# Patient Record
Sex: FEMALE | Race: BLACK OR AFRICAN AMERICAN | NOT HISPANIC OR LATINO | Employment: OTHER | URBAN - METROPOLITAN AREA
[De-identification: names, ages, dates, MRNs, and addresses within clinical notes are randomized per-mention and may not be internally consistent; named-entity substitution may affect disease eponyms.]

---

## 2017-12-01 NOTE — PROGRESS NOTES
SUBJECTIVE:   CC: Gerardo Valenzuela is an 57 year old woman who presents for preventive health visit.     Healthy Habits:    Do you get at least three servings of calcium containing foods daily (dairy, green leafy vegetables, etc.)? no, taking calcium and/or vitamin D supplement: no    Amount of exercise or daily activities, outside of work: 0 day(s) per week    Problems taking medications regularly No    Medication side effects: No    Have you had an eye exam in the past two years? yes    Do you see a dentist twice per year? yes    Do you have sleep apnea, excessive snoring or daytime drowsiness?no        Today's PHQ-2 Score:   PHQ-2 ( 1999 [employer]) 8/15/2012   Q1: Little interest or pleasure in doing things 0   Q2: Feeling down, depressed or hopeless 0   PHQ-2 Score 0         Abuse: Current or Past(Physical, Sexual or Emotional)- No  Do you feel safe in your environment - Yes    Social History   Substance Use Topics     Smoking status: Never Smoker     Smokeless tobacco: Never Used     Alcohol use No     The patient does not drink >3 drinks per day nor >7 drinks per week.    Reviewed orders with patient.  Reviewed health maintenance and updated orders accordingly - Yes  Labs reviewed in [facility]    Patient over age 50, mutual decision to screen reflected in health maintenance.      Pertinent mammograms are reviewed under the imaging tab.  History of abnormal Pap smear: NO - age 30- 65 PAP every 3 years recommended    Reviewed and updated as needed this visit by clinical staff  Allergies         Reviewed and updated as needed this visit by Provider              ROS:  C: NEGATIVE for fever, chills, change in weight  I: NEGATIVE for worrisome rashes, moles or lesions  E: NEGATIVE for vision changes or irritation  ENT: NEGATIVE for ear, mouth and throat problems  R: NEGATIVE for significant cough or SOB  B: NEGATIVE for masses, tenderness or discharge  CV: NEGATIVE for chest pain, palpitations or peripheral edema  GI:  NEGATIVE for nausea, abdominal pain, or change in bowel habits; rare heartburn symptoms with consumption of spicy foods. Not daily.  : NEGATIVE for unusual urinary or vaginal symptoms. No vaginal bleeding.  N: NEGATIVE for weakness, dizziness or paresthesias  P: NEGATIVE for changes in mood or affect     OBJECTIVE:   /74  Pulse 84  Temp 97.3  F (36.3  C) (Oral)  Ht 5' (1.524 m)  Wt 144 lb 8 oz (65.5 kg)  SpO2 97%  BMI 28.22 kg/m2  EXAM:  GENERAL: healthy, alert and no distress  HENT: ear canals and TM's normal, nose and mouth without ulcers or lesions  NECK: no adenopathy, no asymmetry, masses, or scars and thyroid normal to palpation  RESP: lungs clear to auscultation - no rales, rhonchi or wheezes  BREAST: normal without masses, tenderness or nipple discharge and no palpable axillary masses or adenopathy  CV: regular rate and rhythm, normal S1 S2, no S3 or S4, no murmur, click or rub, no peripheral edema and peripheral pulses strong  ABDOMEN: soft, nontender, no hepatosplenomegaly, no masses and bowel sounds normal   (female): normal female external genitalia, normal urethral meatus, vaginal mucosa, normal cervix/adnexa/uterus without masses or discharge  MS: no gross musculoskeletal defects noted, no edema  SKIN: no suspicious lesions or rashes  PSYCH: mentation appears normal, affect normal/bright    ASSESSMENT/PLAN:       ICD-10-CM    1. Screening cholesterol level Z13.220 Lipid Profile   2. Routine general medical examination at a health care facility Z00.00    3. Encounter for routine adult medical exam with abnormal findings Z00.01    4. Screen for colon cancer Z12.11    5. Visit for screening mammogram Z12.31 MA SCREENING DIGITAL BILAT - Future  (s+30)   6. Screening for malignant neoplasm of cervix Z12.4 Pap imaged thin layer screen with HPV - recommended age 30 - 65 years (select HPV order below)   7. Need for hepatitis C screening test Z11.59 Hepatitis C Screen Reflex to HCV RNA Quant and  Genotype   8. Heartburn R12 ranitidine (ZANTAC) 150 MG tablet       COUNSELING:   Reviewed preventive health counseling, as reflected in patient instructions       Regular exercise       Healthy diet/nutrition         reports that she has never smoked. She has never used smokeless tobacco.    Estimated body mass index is 28.22 kg/(m^2) as calculated from the following:    Height as of this encounter: 5' (1.524 m).    Weight as of this encounter: 144 lb 8 oz (65.5 kg).         Counseling Resources:  ATP IV Guidelines  Pooled Cohorts Equation Calculator  Breast Cancer Risk Calculator  FRAX Risk Assessment  ICSI Preventive Guidelines  Dietary Guidelines for Americans, 2010  USDA's MyPlate  ASA Prophylaxis  Lung CA Screening    IVONNE Dover CNP  [facility]

## 2017-12-04 ENCOUNTER — OFFICE VISIT (OUTPATIENT)
Dept: FAMILY MEDICINE | Facility: CLINIC | Age: 57
End: 2017-12-04
Payer: COMMERCIAL

## 2017-12-04 VITALS
HEART RATE: 84 BPM | SYSTOLIC BLOOD PRESSURE: 124 MMHG | OXYGEN SATURATION: 97 % | WEIGHT: 144.5 LBS | BODY MASS INDEX: 28.37 KG/M2 | DIASTOLIC BLOOD PRESSURE: 74 MMHG | HEIGHT: 60 IN | TEMPERATURE: 97.3 F

## 2017-12-04 DIAGNOSIS — R12 HEARTBURN: ICD-10-CM

## 2017-12-04 DIAGNOSIS — Z12.11 SCREEN FOR COLON CANCER: ICD-10-CM

## 2017-12-04 DIAGNOSIS — Z13.220 SCREENING CHOLESTEROL LEVEL: Primary | ICD-10-CM

## 2017-12-04 DIAGNOSIS — Z12.4 SCREENING FOR MALIGNANT NEOPLASM OF CERVIX: ICD-10-CM

## 2017-12-04 DIAGNOSIS — Z00.00 ROUTINE GENERAL MEDICAL EXAMINATION AT A HEALTH CARE FACILITY: ICD-10-CM

## 2017-12-04 DIAGNOSIS — Z00.01 ENCOUNTER FOR ROUTINE ADULT MEDICAL EXAM WITH ABNORMAL FINDINGS: ICD-10-CM

## 2017-12-04 DIAGNOSIS — Z11.59 NEED FOR HEPATITIS C SCREENING TEST: ICD-10-CM

## 2017-12-04 DIAGNOSIS — Z12.31 VISIT FOR SCREENING MAMMOGRAM: ICD-10-CM

## 2017-12-04 PROCEDURE — 99396 PREV VISIT EST AGE 40-64: CPT | Performed by: NURSE PRACTITIONER

## 2017-12-04 PROCEDURE — 80061 LIPID PANEL: CPT | Performed by: NURSE PRACTITIONER

## 2017-12-04 PROCEDURE — G0145 SCR C/V CYTO,THINLAYER,RESCR: HCPCS | Performed by: NURSE PRACTITIONER

## 2017-12-04 PROCEDURE — 87624 HPV HI-RISK TYP POOLED RSLT: CPT | Performed by: NURSE PRACTITIONER

## 2017-12-04 PROCEDURE — 86803 HEPATITIS C AB TEST: CPT | Performed by: NURSE PRACTITIONER

## 2017-12-04 PROCEDURE — 36415 COLL VENOUS BLD VENIPUNCTURE: CPT | Performed by: NURSE PRACTITIONER

## 2017-12-04 NOTE — LETTER
December 12, 2017      Gerardo Valenzuela  [address] [place] [address]  [place] [address]        Dear Gerardo,   Your cholesterol was slightly elevated for your age, but is not high enough to require cholesterol medication. I would recommend keeping up with lots of fruits and vegetables, and daily walking.   Please let me know if you have any questions.   Take care    Resulted Orders   Hepatitis C Screen Reflex to HCV RNA Quant and Genotype   Result Value Ref Range    Hepatitis C Antibody Nonreactive NR^Nonreactive      Comment:      Assay performance characteristics have not been established for newborns,   infants, and children     Lipid Profile   Result Value Ref Range    Cholesterol 222 (H) <200 mg/dL      Comment:      Desirable:       <200 mg/dl    Triglycerides 227 (H) <150 mg/dL      Comment:      Borderline high:  150-199 mg/dl  High:             200-499 mg/dl  Very high:       >499 mg/dl      HDL Cholesterol 37 (L) >49 mg/dL    LDL Cholesterol Calculated 140 (H) <100 mg/dL      Comment:      Above desirable:  100-129 mg/dl  Borderline High:  130-159 mg/dL  High:             160-189 mg/dL  Very high:       >189 mg/dl      Non HDL Cholesterol 185 (H) <130 mg/dL      Comment:      Above Desirable:  130-159 mg/dl  Borderline high:  160-189 mg/dl  High:             190-219 mg/dl  Very high:       >219 mg/dl         If you have any questions or concerns, please call the clinic at the number listed above.       Sincerely,        IVONNE Dover CNP

## 2017-12-04 NOTE — LETTER
December 13, 2017    Gerardo Valenzuela  [address] [place] [address]  [place] [address]    Dear Gerardo,  We are happy to inform you that your PAP smear result from 12/04/17 is normal.  We are now able to do a follow up test on PAP smears. The DNA test is for HPV (Human Papilloma Virus). Cervical cancer is closely linked with certain types of HPV. Your result showed no evidence of high risk HPV.  Therefore we recommend you return in 5 years for your next pap smear and HPV test.  You will still need to return to the clinic every year for an annual exam and other preventive tests.  Please contact the clinic at [phone] with any questions.  Sincerely,    IVONNE Dover CNP/[facility]

## 2017-12-04 NOTE — PROGRESS NOTES
"  SUBJECTIVE:   Gerardo Valenzuela is a 57 year old female who presents to clinic today for the following health issues:    Joint Pain    Onset: 11/22/2017     Description:   Location: left shoulder, left ribs, and left wrist  Character: Sharp, and tightness    Intensity: severe    Progression of Symptoms: same    Accompanying Signs & Symptoms:  Other symptoms: swelling    History:   Previous similar pain: no       Precipitating factors:   Trauma or overuse: YES- Fell down in front of the store     Alleviating factors:  Improved by: nothing    Therapies Tried and outcome: ice, ibuprofen   Had previous xrays at another clinic for wrist,  which were all normal  Has continued to have some pain in neck and left shoulder; but otherwise feeling much better and moving around well.     -------------------------------------    Problem list and histories reviewed & adjusted, as indicated.  Additional history: as documented    Patient Active Problem List   Diagnosis     CARDIOVASCULAR SCREENING; LDL GOAL LESS THAN 160     No past surgical history on file.    Social History   Substance Use Topics     Smoking status: Never Smoker     Smokeless tobacco: Never Used     Alcohol use No     Family History   Problem Relation Age of Onset     CEREBROVASCULAR DISEASE Mother              [facility]  M[facility]       Reviewed and updated as needed this visit by Provider         ROS:  Constitutional, HEENT, cardiovascular, pulmonary, gi and gu systems are negative, except as otherwise noted.      OBJECTIVE:   /72  Pulse 76  Temp 97  F (36.1  C) (Oral)  Ht 4' 11.57\" (1.513 m)  Wt 142 lb 11.2 oz (64.7 kg)  SpO2 97%  BMI 28.28 kg/m2  Body mass index is 28.28 kg/(m^2).   GENERAL: healthy, alert and no distress  NECK: no adenopathy, no asymmetry, masses, or scars and thyroid normal to palpation  RESP: lungs clear to auscultation - no rales, rhonchi or wheezes  CV: regular rate " and rhythm, normal S1 S2, no S3 or S4, no murmur, click or rub, no peripheral edema and peripheral pulses strong  ABDOMEN: soft, nontender, no hepatosplenomegaly, no masses and bowel sounds normal  MS: tenderness to palpation paracervical muscles; normal strength and  ROM both upper extremities; tender to palpation anterior left shoulder      Diagnostic Test Results:  none     ASSESSMENT/PLAN:     Problem List Items Addressed This Visit     None      Visit Diagnoses     Cervicalgia    -  Primary    Relevant Medications    ibuprofen (ADVIL/MOTRIN) 800 MG tablet    Other Relevant Orders    XR Cervical Spine 2/3 Views (Completed)    Acute pain of left shoulder        Relevant Medications    acetaminophen-codeine (TYLENOL #3) 300-30 MG per tablet    Other Relevant Orders    XR Shoulder Left 2 Views (Completed)    XR Chest 2 Views (Completed)    PHYSICAL THERAPY REFERRAL         IVONNE Dover [facility]

## 2017-12-04 NOTE — MR AVS SNAPSHOT
After Visit Summary   12/4/2017    Gerardo Valenzuela    MRN: [id number]           Patient Information     Date Of Birth          1960        Visit Information        Provider Department      12/4/2017 11:30 AM Shawna Miranda APRN [facility]        Today's Diagnoses     Screening cholesterol level    -  1    Routine general medical examination at a health care facility        Encounter for routine adult medical exam with abnormal findings        Screen for colon cancer        Visit for screening mammogram        Screening for malignant neoplasm of cervix        Need for hepatitis C screening test          Care Instructions      Preventive Health Recommendations  Female Ages 50 - 64    Yearly exam: See your health care provider every year in order to  o Review health changes.   o Discuss preventive care.    o Review your medicines if your doctor has prescribed any.      Get a Pap test every three years (unless you have an abnormal result and your provider advises testing more often).    If you get Pap tests with HPV test, you only need to test every 5 years, unless you have an abnormal result.     You do not need a Pap test if your uterus was removed (hysterectomy) and you have not had cancer.    You should be tested each year for STDs (sexually transmitted diseases) if you're at risk.     Have a mammogram every 1 to 2 years.    Have a colonoscopy at age 50, or have a yearly FIT test (stool test). These exams screen for colon cancer.      Have a cholesterol test every 5 years, or more often if advised.    Have a diabetes test (fasting glucose) every three years. If you are at risk for diabetes, you should have this test more often.     If you are at risk for osteoporosis (brittle bone disease), think about having a bone density scan (DEXA).    Shots: Get a flu shot each year. Get a tetanus shot every 10 years.    Nutrition:     Eat at least 5 servings of fruits and  vegetables each day.    Eat whole-grain bread, whole-wheat pasta and brown rice instead of white grains and rice.    Talk to your provider about Calcium and Vitamin D.     Lifestyle    Exercise at least 150 minutes a week (30 minutes a day, 5 days a week). This will help you control your weight and prevent disease.    Limit alcohol to one drink per day.    No smoking.     Wear sunscreen to prevent skin cancer.     See your dentist every six months for an exam and cleaning.    See your eye doctor every 1 to 2 years.            Follow-ups after your visit        Your next 10 appointments already scheduled     Dec 05, 2017 10:30 AM CST   Office Visit with IVONNE Dover CNP   [facility]    [address] [place] [id number] [phone]           Bring a current list of meds and any records pertaining to this visit. For Physicals, please bring immunization records and any forms needing to be filled out. Please arrive 10 minutes early to complete paperwork.              Future tests that were ordered for you today     Open Future Orders        Priority Expected Expires Ordered    MA SCREENING DIGITAL BILAT - Future  (s+30) Routine  12/1/2018 12/4/2017            Who to contact     If you have questions or need follow up information about today's clinic visit or your schedule please contact [facility] [place] at [phone].  Normal or non-critical lab and imaging results will be communicated to you by MyChart, letter or phone within 4 business days after the clinic has received the results. If you do not hear from us within 7 days, please contact the clinic through MyChart or phone. If you have a critical or abnormal lab result, we will notify you by phone as soon as possible.  Submit refill requests through [facility] or call your pharmacy and they will forward the refill request to us. Please allow 3 business days for your  "refill to be completed.          Additional Information About Your Visit        [employer] Information     [employer] lets you send messages to your doctor, view your test results, renew your prescriptions, schedule appointments and more. To sign up, go to [email or web address] . Click on \"Log in\" on the left side of the screen, which will take you to the Welcome page. Then click on \"Sign up Now\" on the right side of the page.     You will be asked to enter the access code listed below, as well as some personal information. Please follow the directions to create your username and password.     Your access code is: [email or web address]  Expires: 3/4/2018 12:08 PM     Your access code will  in 90 days. If you need help or a new code, please call your [place] clinic or [phone].        Care EveryWhere ID     This is your Care EveryWhere ID. This could be used by other organizations to access your [place] medical records  [id number]        Your Vitals Were     Pulse Temperature Height Pulse Oximetry BMI (Body Mass Index)       84 97.3  F (36.3  C) (Oral) 5' (1.524 m) 97% 28.22 kg/m2        Blood Pressure from Last 3 Encounters:   17 124/74   13 100/58   08/15/12 115/72    Weight from Last 3 Encounters:   17 144 lb 8 oz (65.5 kg)   13 142 lb (64.4 kg)   08/15/12 137 lb (62.1 kg)              We Performed the Following     Hepatitis C Screen Reflex to HCV RNA Quant and Genotype     Lipid Profile     Pap imaged thin layer screen with HPV - recommended age 30 - 65 years (select HPV order below)        Primary Care Provider Office Phone # Fax #    [facility] [phone]       [address] [place] [id number]        Equal Access to Services     SCOTTIE MCCANN : Wilian Rendon, dannielle trejo, vilma guo. So [facility] [phone].    ATENCIÓN: Si agnieszka ram, tiene a fuentes disposición " servicios gratuitos de asistencia lingüística. Gail ziegler [phone].    We comply with applicable federal civil rights laws and [place] laws. We do not discriminate on the basis of race, color, national origin, age, disability, sex, sexual orientation, or gender identity.            Thank you!     Thank you for choosing [facility]  for your care. Our goal is always to provide you with excellent care. Hearing back from our patients is one way we can continue to improve our services. Please take a few minutes to complete the written survey that you may receive in the mail after your visit with us. Thank you!             Your Updated Medication List - Protect others around you: Learn how to safely use, store and throw away your medicines at www.disposemymeds.org.          This list is accurate as of: 12/4/17 12:08 PM.  Always use your most recent med list.                   Brand Name Dispense Instructions for use Diagnosis    omeprazole 20 MG tablet     30 tablet    Take 1 tablet (20 mg) by mouth daily Take 30-60 minutes before a meal.    Esophageal reflux

## 2017-12-05 ENCOUNTER — HOSPITAL ENCOUNTER (OUTPATIENT)
Dept: GENERAL RADIOLOGY | Facility: CLINIC | Age: 57
End: 2017-12-05
Attending: NURSE PRACTITIONER
Payer: COMMERCIAL

## 2017-12-05 ENCOUNTER — HOSPITAL ENCOUNTER (OUTPATIENT)
Dept: GENERAL RADIOLOGY | Facility: CLINIC | Age: 57
Discharge: HOME | End: 2017-12-05
Attending: NURSE PRACTITIONER | Admitting: NURSE PRACTITIONER
Payer: COMMERCIAL

## 2017-12-05 ENCOUNTER — OFFICE VISIT (OUTPATIENT)
Dept: FAMILY MEDICINE | Facility: CLINIC | Age: 57
End: 2017-12-05
Payer: COMMERCIAL

## 2017-12-05 VITALS
HEART RATE: 76 BPM | BODY MASS INDEX: 28.02 KG/M2 | WEIGHT: 142.7 LBS | HEIGHT: 60 IN | SYSTOLIC BLOOD PRESSURE: 120 MMHG | OXYGEN SATURATION: 97 % | TEMPERATURE: 97 F | DIASTOLIC BLOOD PRESSURE: 72 MMHG

## 2017-12-05 DIAGNOSIS — M25.512 ACUTE PAIN OF LEFT SHOULDER: ICD-10-CM

## 2017-12-05 DIAGNOSIS — M54.2 CERVICALGIA: Primary | ICD-10-CM

## 2017-12-05 LAB
CHOLEST SERPL-MCNC: 222 MG/DL
HCV AB SERPL QL IA: NONREACTIVE
HDLC SERPL-MCNC: 37 MG/DL
LDLC SERPL CALC-MCNC: 140 MG/DL
NONHDLC SERPL-MCNC: 185 MG/DL
TRIGL SERPL-MCNC: 227 MG/DL

## 2017-12-05 PROCEDURE — 73030 X-RAY EXAM OF SHOULDER: CPT | Mod: LT

## 2017-12-05 PROCEDURE — 72040 X-RAY EXAM NECK SPINE 2-3 VW: CPT

## 2017-12-05 PROCEDURE — 99213 OFFICE O/P EST LOW 20 MIN: CPT | Performed by: NURSE PRACTITIONER

## 2017-12-05 PROCEDURE — 71020 XR CHEST 2 VW: CPT

## 2017-12-05 RX ORDER — IBUPROFEN 800 MG/1
800 TABLET, FILM COATED ORAL EVERY 8 HOURS PRN
Qty: 90 TABLET | Refills: 1 | Status: SHIPPED | OUTPATIENT
Start: 2017-12-05 | End: 2018-06-14

## 2017-12-05 NOTE — MR AVS SNAPSHOT
"              After Visit Summary   12/5/2017    Gerardo Valenzuela    MRN: [id number]           Patient Information     Date Of Birth          1960        Visit Information        Provider Department      12/5/2017 10:30 AM Shawna Miranda APRN [facility]        Today's Diagnoses     Cervicalgia    -  1    Screen for colon cancer        Visit for screening mammogram        Screening for malignant neoplasm of cervix        Need for hepatitis C screening test        Acute pain of left shoulder           Follow-ups after your visit        Additional Services     PHYSICAL THERAPY REFERRAL       *This therapy referral will be filtered to a centralized scheduling office at [facility] and the patient will receive a call to schedule an appointment at a [place] location most convenient for them. *     [facility] provides Physical Therapy evaluation and treatment and many specialty services across the [place] system.  If requesting a specialty program, please choose from the list below.    If you have not heard from the scheduling office within 2 business days, please call [phone] for all locations, with the exception of [place], please call [phone].  Treatment: Evaluation & Treatment  Special Instructions/Modalities:   Special Programs: None    Please be aware that coverage of these services is subject to the terms and limitations of your health insurance plan.  Call member services at your health plan with any benefit or coverage questions.      **Note to Provider:  If you are referring outside of [place] for the therapy appointment, please list the name of the location in the \"special instructions\" above, print the referral and give to the patient to schedule the appointment.                  Future tests that were ordered for you today     Open Future Orders        Priority Expected Expires Ordered    MA SCREENING DIGITAL BILAT - Future  " "(s+30) Routine  2018            Who to contact     If you have questions or need follow up information about today's clinic visit or your schedule please contact [facility] directly at [phone].  Normal or non-critical lab and imaging results will be communicated to you by MyChart, letter or phone within 4 business days after the clinic has received the results. If you do not hear from us within 7 days, please contact the clinic through IQuumhart or phone. If you have a critical or abnormal lab result, we will notify you by phone as soon as possible.  Submit refill requests through 1-4 All or call your pharmacy and they will forward the refill request to us. Please allow 3 business days for your refill to be completed.          Additional Information About Your Visit        [email or web address] Information     1-4 All lets you send messages to your doctor, view your test results, renew your prescriptions, schedule appointments and more. To sign up, go to [email or web address] . Click on \"Log in\" on the left side of the screen, which will take you to the Welcome page. Then click on \"Sign up Now\" on the right side of the page.     You will be asked to enter the access code listed below, as well as some personal information. Please follow the directions to create your username and password.     Your access code is: [email or web address]  Expires: 3/4/2018 12:08 PM     Your access code will  in 90 days. If you need help or a new code, please call your [facility] or [phone].        Care EveryWhere ID     This is your Care EveryWhere ID. This could be used by other organizations to access your [facility] medical records  [id number]        Your Vitals Were     Pulse Temperature Height Pulse Oximetry BMI (Body Mass Index)       76 97  F (36.1  C) (Oral) 4' 11.57\" (1.513 m) 97% 28.28 kg/m2        Blood Pressure from Last 3 Encounters:   17 120/72   17 124/74   13 100/58    " Weight from Last 3 Encounters:   12/05/17 142 lb 11.2 oz (64.7 kg)   12/04/17 144 lb 8 oz (65.5 kg)   11/26/13 142 lb (64.4 kg)              We Performed the Following     PHYSICAL THERAPY REFERRAL     XR Cervical Spine 2/3 Views     XR Chest 2 Views     XR Shoulder Left 2 Views          Today's Medication Changes          These changes are accurate as of: 12/5/17 11:16 AM.  If you have any questions, ask your nurse or doctor.               Start taking these medicines.        Dose/Directions    acetaminophen-codeine 300-30 MG per tablet   Commonly known as:  TYLENOL #3   Used for:  Acute pain of left shoulder        Dose:  1 tablet   Take 1 tablet by mouth every 4 hours as needed for pain maximum 2  tablet(s) per day   Quantity:  18 tablet   Refills:  1       ibuprofen 800 MG tablet   Commonly known as:  ADVIL/MOTRIN   Used for:  Cervicalgia        Dose:  800 mg   Take 1 tablet (800 mg) by mouth every 8 hours as needed for moderate pain   Quantity:  90 tablet   Refills:  1            Where to get your medicines      These medications were sent to [facility] [address]  [address] [facility] [address], [facility] [address]     Phone:  [phone]     ibuprofen 800 MG tablet         Some of these will need a paper prescription and others can be bought over the counter.  Ask your nurse if you have questions.     Bring a paper prescription for each of these medications     acetaminophen-codeine 300-30 MG per tablet                Primary Care Provider Office Phone # Fax #    [facility] [phone]       [address] [facility] [address]  [facility] [address]        Equal Access to Services     SCOTTIE MCCANN AH: Hadii kirill durano Sokhushbu, waaxda luqadaha, qaybta kaalmada adeegyada, vilma castillo. So [facility] [phone].    ATENCIÓN: Si habla español, tiene a fuentes disposición servicios gratuitos de asistencia lingüística. Llame al  [phone].    We comply with applicable federal civil rights laws and [place] laws. We do not discriminate on the basis of race, color, national origin, age, disability, sex, sexual orientation, or gender identity.            Thank you!     Thank you for choosing [facility]  for your care. Our goal is always to provide you with excellent care. Hearing back from our patients is one way we can continue to improve our services. Please take a few minutes to complete the written survey that you may receive in the mail after your visit with us. Thank you!             Your Updated Medication List - Protect others around you: Learn how to safely use, store and throw away your medicines at www.disposemymeds.org.          This list is accurate as of: 12/5/17 11:16 AM.  Always use your most recent med list.                   Brand Name Dispense Instructions for use Diagnosis    acetaminophen-codeine 300-30 MG per tablet    TYLENOL #3    18 tablet    Take 1 tablet by mouth every 4 hours as needed for pain maximum 2  tablet(s) per day    Acute pain of left shoulder       ibuprofen 800 MG tablet    ADVIL/MOTRIN    90 tablet    Take 1 tablet (800 mg) by mouth every 8 hours as needed for moderate pain    Cervicalgia       omeprazole 20 MG tablet     30 tablet    Take 1 tablet (20 mg) by mouth daily Take 30-60 minutes before a meal.    Esophageal reflux       ranitidine 150 MG tablet    ZANTAC    60 tablet    Take 1 tablet (150 mg) by mouth 2 times daily    Heartburn

## 2017-12-05 NOTE — LETTER
December 11, 2017      Gerardo Valenzuela  [address] [place] [id number]        Dear ,    We are writing to inform you of your test results.  Your test results fall within the expected range(s) or remain unchanged from previous results.  Please continue with current treatment plan.    Resulted Orders   XR Cervical Spine 2/3 Views    Narrative    CERVICAL SPINE TWO TO THREE VIEWS December 5, 2017 11:52 AM     HISTORY: Cervicalgia.    COMPARISON: None.      Impression    IMPRESSION: Vertebral body C2-C7 well seen on the lateral view. No  loss of vertebral body height. Bones appear osteopenic. No significant  loss of intervertebral disc space. Base of the dens is unremarkable.  Lateral masses of C1 appear normally aligned on C2. Small well  marginated density along the anterior inferior aspect of the C5  vertebral body presumably representing an osteophyte.    J CARLOS ROSAS MD       If you have any questions or concerns, please call the clinic at the number listed above.       Sincerely,        IVONNE Dover CNP

## 2017-12-06 LAB
COPATH REPORT: NORMAL
PAP: NORMAL

## 2017-12-08 LAB
FINAL DIAGNOSIS: NORMAL
HPV HR 12 DNA CVX QL NAA+PROBE: NEGATIVE
HPV16 DNA SPEC QL NAA+PROBE: NEGATIVE
HPV18 DNA SPEC QL NAA+PROBE: NEGATIVE
SPECIMEN DESCRIPTION: NORMAL

## 2018-06-14 ENCOUNTER — OFFICE VISIT (OUTPATIENT)
Dept: FAMILY MEDICINE | Facility: CLINIC | Age: 58
End: 2018-06-14
Payer: COMMERCIAL

## 2018-06-14 VITALS
SYSTOLIC BLOOD PRESSURE: 118 MMHG | BODY MASS INDEX: 28.14 KG/M2 | WEIGHT: 142 LBS | DIASTOLIC BLOOD PRESSURE: 70 MMHG | TEMPERATURE: 98.1 F | HEART RATE: 82 BPM

## 2018-06-14 DIAGNOSIS — R10.31 RLQ ABDOMINAL PAIN: ICD-10-CM

## 2018-06-14 DIAGNOSIS — Z12.11 SPECIAL SCREENING FOR MALIGNANT NEOPLASMS, COLON: ICD-10-CM

## 2018-06-14 DIAGNOSIS — R30.0 DYSURIA: Primary | ICD-10-CM

## 2018-06-14 DIAGNOSIS — R10.13 DYSPEPSIA: ICD-10-CM

## 2018-06-14 LAB
ALBUMIN UR-MCNC: NEGATIVE MG/DL
APPEARANCE UR: CLEAR
BILIRUB UR QL STRIP: NEGATIVE
COLOR UR AUTO: YELLOW
GLUCOSE UR STRIP-MCNC: NEGATIVE MG/DL
HGB UR QL STRIP: NEGATIVE
KETONES UR STRIP-MCNC: NEGATIVE MG/DL
LEUKOCYTE ESTERASE UR QL STRIP: NEGATIVE
NITRATE UR QL: NEGATIVE
PH UR STRIP: 7 PH (ref 5–7)
SOURCE: NORMAL
SP GR UR STRIP: <=1.005 (ref 1–1.03)
UROBILINOGEN UR STRIP-ACNC: 0.2 EU/DL (ref 0.2–1)

## 2018-06-14 PROCEDURE — 99214 OFFICE O/P EST MOD 30 MIN: CPT | Performed by: NURSE PRACTITIONER

## 2018-06-14 PROCEDURE — 81003 URINALYSIS AUTO W/O SCOPE: CPT | Performed by: NURSE PRACTITIONER

## 2018-06-14 RX ORDER — FAMOTIDINE 20 MG/1
20 TABLET, FILM COATED ORAL 2 TIMES DAILY
Qty: 60 TABLET | Refills: 1 | Status: SHIPPED | OUTPATIENT
Start: 2018-06-14 | End: 2018-06-20

## 2018-06-14 NOTE — MR AVS SNAPSHOT
After Visit Summary   6/14/2018    Gerardo Valenzuela    MRN: [id number]           Patient Information     Date Of Birth          1960        Visit Information        Provider Department      6/14/2018 3:20 PM Bibiana Najera APRN [facility]        Today's Diagnoses     Dysuria    -  1    RLQ abdominal pain        Special screening for malignant neoplasms, colon        Dyspepsia          Care Instructions    Please return for a follow up appointment with me after getting the h pylori and we can go over the results and see how the pepcid is working for you        *Abdominal Pain, Unknown Cause (Female)    The exact cause of your abdominal (stomach) pain is not certain. This does not mean that this is something to worry about, or the right tests were not done. Everyone likes to know the exact cause of the problem, but sometimes with abdominal pain, there is no clear-cut cause, and this could be a good thing. The good news is that your symptoms can be treated, and you will feel better.   Your condition does not seem serious now; however, sometimes the signs of a serious problem may take more time to appear. For this reason, it is important for you to watch for any new symptoms, problems, or worsening of your condition.  Over the next few days, the abdominal pain may come and go, or be continuous. Other common symptoms can include nausea and vomiting. Sometimes it can be difficult to tell if you feel nauseous, you may just feel bad and not associate that feeling with nausea. Constipation, diarrhea, and a fever may go along with the pain.  The pain may continue even if treated correctly over the following days. Depending on how things go, sometimes the cause can become clear and may require further or different treatment. Additional evaluations, medications, or tests may be needed.  Home care  Your health care provider may prescribe medications for pain, symptoms, or an  infection.  Follow the health care provider's instructions for taking these medications.  General care    Rest until your next exam. No strenuous activities.    Try to find positions that ease discomfort. A small pillow placed on the abdomen may help relieve pain.    Something warm on your abdomen (such as a heating pad) may help, but be careful not to burn yourself.  Diet    Do not force yourself to eat, especially if having cramps, vomiting, or diarrhea.    Water is important so you do not get dehydrated. Soup may also be good. Sports drinks may also help, especially if they are not too acidic. Make sure you don't drink sugary drinks as this can make things worse. Take liquids in small amounts. Do not guzzle them.    Caffeine sometimes makes the pain and cramping worse.    Avoid dairy products if you have vomiting or diarrhea.    Don't eat large amounts at a time. Wait a few minutes between bites.    Eat a diet low in fiber (called a low-residue diet). Foods allowed include refined breads, white rice, fruit and vegetable juices without pulp, tender meats. These foods will pass more easily through the intestine.    Avoid fried or fatty foods, dairy, alcohol and spicy foods until your symptoms go away.  Follow-up care  Follow up with your health care provider as instructed, or if your pain does not begin to improve in the next 24 hours.  When to seek medical care  Seek prompt medical care if any of the following occur:    Pain gets worse or moves to the right lower abdomen    New or worsening vomiting or diarrhea    Swelling of the abdomen    Unable to pass stool for more than three days    New fever over 101  F (38.3 C), or rising fever    Blood in vomit or bowel movements (dark red or black color)    Jaundice (yellow color of eyes and skin)    Weakness, dizziness    Chest, arm, back, neck or jaw pain    Unexpected vaginal bleeding or missed period  Call 911  Call emergency services if any of the following  occur:    Trouble breathing    Confusion    Fainting or loss of consciousness    Rapid heart rate    Seizure    [id number] Oli Garcia, [address] [facility], [place] [address]. All rights reserved. This information is not intended as a substitute for professional medical care. Always follow your healthcare professional's instructions.                Follow-ups after your visit        Additional Services     GASTROENTEROLOGY ADULT REF PROCEDURE ONLY       Last Lab Result: Creatinine (mg/dL)       Date                     Value                 11/27/2013               0.50 (L)         ----------  Body mass index is 28.14 kg/(m^2).     Needed:  No  Language:  English    Patient will be contacted to schedule procedure.     Please be aware that coverage of these services is subject to the terms and limitations of your health insurance plan.  Call member services at your health plan with any benefit or coverage questions.  Any procedures must be performed at a [facility] facility OR coordinated by your clinic's referral office.    Please bring the following with you to your appointment:    (1) Any X-Rays, CTs or MRIs which have been performed.  Contact the facility where they were done to arrange for  prior to your scheduled appointment.    (2) List of current medications   (3) This referral request   (4) Any documents/labs given to you for this referral                  Future tests that were ordered for you today     Open Future Orders        Priority Expected Expires Ordered    H Pylori antigen stool Routine  7/14/2018 6/14/2018            Who to contact     If you have questions or need follow up information about today's clinic visit or your schedule please contact [facility] directly at [phone].  Normal or non-critical lab and imaging results will be communicated to you by MyChart, letter or phone within 4 business days after the clinic has received the results. If you do  not hear from us within 7 days, please contact the clinic through Airbrite or phone. If you have a critical or abnormal lab result, we will notify you by phone as soon as possible.  Submit refill requests through Airbrite or call your pharmacy and they will forward the refill request to us. Please allow 3 business days for your refill to be completed.          Additional Information About Your Visit        Care EveryWhere ID     This is your Care EveryWhere ID. This could be used by other organizations to access your [facility] medical records  [id number]        Your Vitals Were     Pulse Temperature BMI (Body Mass Index)             82 98.1  F (36.7  C) (Oral) 28.14 kg/m2          Blood Pressure from Last 3 Encounters:   06/14/18 118/70   12/05/17 120/72   12/04/17 124/74    Weight from Last 3 Encounters:   06/14/18 142 lb (64.4 kg)   12/05/17 142 lb 11.2 oz (64.7 kg)   12/04/17 144 lb 8 oz (65.5 kg)              We Performed the Following     GASTROENTEROLOGY ADULT REF PROCEDURE ONLY     UA reflex to Microscopic and Culture          Today's Medication Changes          These changes are accurate as of 6/14/18  4:22 PM.  If you have any questions, ask your nurse or doctor.               Start taking these medicines.        Dose/Directions    famotidine 20 MG tablet   Commonly known as:  PEPCID   Used for:  Dyspepsia   Started by:  Bibiana Najera APRN CNP        Dose:  20 mg   Take 1 tablet (20 mg) by mouth 2 times daily   Quantity:  60 tablet   Refills:  1         Stop taking these medicines if you haven't already. Please contact your care team if you have questions.     ranitidine 150 MG tablet   Commonly known as:  ZANTAC   Stopped by:  Bibiana Najera APRN CNP                Where to get your medicines      These medications were sent to [facility] [place], [place] - [address]  [address] [address]     Phone:  [phone]     famotidine 20 MG tablet                 Primary Care Provider Office Phone # Fax #    [facility] [phone]       [address] [address]        Equal Access to Services     SCOTTIE MCCANN : Hadii kirill ku hadcynthiao Soomaali, waaxda luqadaha, qaybta kaalmada adeegyada, vilma ansarin flynn zapien laPaolamariah castillo. So [facility] [phone].    ATENCIÓN: Si habla español, tiene a fuentes disposición servicios gratuitos de asistencia lingüística. Llame al [phone].    We comply with applicable federal civil rights laws and [place] laws. We do not discriminate on the basis of race, color, national origin, age, disability, sex, sexual orientation, or gender identity.            Thank you!     Thank you for choosing [facility]  for your care. Our goal is always to provide you with excellent care. Hearing back from our patients is one way we can continue to improve our services. Please take a few minutes to complete the written survey that you may receive in the mail after your visit with us. Thank you!             Your Updated Medication List - Protect others around you: Learn how to safely use, store and throw away your medicines at www.disposemymeds.org.          This list is accurate as of 6/14/18  4:22 PM.  Always use your most recent med list.                   Brand Name Dispense Instructions for use Diagnosis    famotidine 20 MG tablet    PEPCID    60 tablet    Take 1 tablet (20 mg) by mouth 2 times daily    Dyspepsia

## 2018-06-14 NOTE — PROGRESS NOTES
SUBJECTIVE:   Gerardo Valenzuela is a 57 year old female who presents to clinic today for the following health issues:      Abdominal pain     Onset: 2 days    Description:   Character: Dull ache and Burning  Location: right lower quadrant  Radiation: None    Intensity: severe    Progression of Symptoms:  worsening    Accompanying Signs & Symptoms:  Fever/Chills?: no   Gas/Bloating: YES  Nausea: YES  Vomitting: no   Diarrhea?: no   Constipation:no   Dysuria or Hematuria: no    History:   Trauma: no   Previous similar pain: YES   Previous tests done: none    Precipitating factors:   Does the pain change with:     Food: YES     BM: no     Urination: YES, burning and pain after urinenating     Alleviating factors:  Heat, Advil     Therapies Tried and outcome: Heat, Advil    Not sure if worse after foods, maybe worse after spicy foods , Maybe greasy foods  Normal soft stools no blood or mucous   Yesterday nausea little bit   UA normal   Works around babies   Heartburn is her baseline and zantac didn't help at all, omeprazole used to help   Drinking plenty of water today but usually on low end       Problem list and histories reviewed & adjusted, as indicated.  Additional history: as documented    Patient Active Problem List   Diagnosis     CARDIOVASCULAR SCREENING; LDL GOAL LESS THAN 160     History reviewed. No pertinent surgical history.    Social History   Substance Use Topics     Smoking status: Never Smoker     Smokeless tobacco: Never Used     Alcohol use No     Family History   Problem Relation Age of Onset     CEREBROVASCULAR DISEASE Mother          Current Outpatient Prescriptions   Medication Sig Dispense Refill     famotidine (PEPCID) 20 MG tablet Take 1 tablet (20 mg) by mouth 2 times daily 60 tablet 1     Allergies   Allergen Reactions     Penicillin [Penicillins]      Recent Labs   Lab Test  12/04/17   1218  11/27/13   0636  11/26/13   2357  02/15/11   0931   LDL  140*  110   --   119   HDL  37*  35*    --   34*   TRIG  227*  154*   --   171*   ALT   --   24   --   23   CR   --   0.50*  0.62  0.54   GFRESTIMATED   --   >90  >90  >90   GFRESTBLACK   --   >90  >90  >90   POTASSIUM   --   4.1  3.8  3.7   TSH   --    --    --   0.76      BP Readings from Last 3 Encounters:   06/14/18 118/70   12/05/17 120/72   12/04/17 124/74    Wt Readings from Last 3 Encounters:   06/14/18 142 lb (64.4 kg)   12/05/17 142 lb 11.2 oz (64.7 kg)   12/04/17 144 lb 8 oz (65.5 kg)                  Labs reviewed in EPIC    Reviewed and updated as needed this visit by clinical staff       Reviewed and updated as needed this visit by Provider         ROS:  Constitutional, HEENT, cardiovascular, pulmonary, gi and gu systems are negative, except as otherwise noted.    OBJECTIVE:     /70  Pulse 82  Temp 98.1  F (36.7  C) (Oral)  Wt 142 lb (64.4 kg)  BMI 28.14 kg/m2  Body mass index is 28.14 kg/(m^2).  GENERAL: healthy, alert and no distress  EYES: Eyes grossly normal to inspection, PERRL and conjunctivae and sclerae normal  HENT: ear canals and TM's normal, nose and mouth without ulcers or lesions  NECK: no adenopathy, no asymmetry, masses, or scars and thyroid normal to palpation  RESP: lungs clear to auscultation - no rales, rhonchi or wheezes  CV: regular rate and rhythm, normal S1 S2, no S3 or S4, no murmur, click or rub, no peripheral edema and peripheral pulses strong  ABDOMEN: soft, nontender or very slight tenderness epigastric/RLQ area no hepatosplenomegaly, no masses and bowel sounds normal, no McBurney or rebound tenderness, negative Zhu's    MS: no gross musculoskeletal defects noted, no edema  NEURO: Normal strength and tone, mentation intact and speech normal  BACK: no CVA tenderness, no paralumbar tenderness  PSYCH: mentation appears normal, affect normal/bright    Diagnostic Test Results:  Results for orders placed or performed in visit on 06/14/18 (from the past 24 hour(s))   UA reflex to Microscopic and Culture    Result Value Ref Range    Color Urine Yellow     Appearance Urine Clear     Glucose Urine Negative NEG^Negative mg/dL    Bilirubin Urine Negative NEG^Negative    Ketones Urine Negative NEG^Negative mg/dL    Specific Gravity Urine <=1.005 1.003 - 1.035    Blood Urine Negative NEG^Negative    pH Urine 7.0 5.0 - 7.0 pH    Protein Albumin Urine Negative NEG^Negative mg/dL    Urobilinogen Urine 0.2 0.2 - 1.0 EU/dL    Nitrite Urine Negative NEG^Negative    Leukocyte Esterase Urine Negative NEG^Negative    Source Midstream Urine        ASSESSMENT/PLAN:         ICD-10-CM    1. Dysuria R30.0 UA reflex to Microscopic and Culture   2. RLQ abdominal pain R10.31 H Pylori antigen stool   3. Special screening for malignant neoplasms, colon Z12.11 GASTROENTEROLOGY ADULT REF PROCEDURE ONLY   4. Dyspepsia R10.13 famotidine (PEPCID) 20 MG tablet   has not had h pylori testing or colon cancer screening, will do these while she works on good self care we discussed today, exam otherwise normal. Could be viral illness, monitor symptoms and if any abdominal red flags go to UC or ER in off hours.    Trial of pepcid, omeprazole only as needed--discussed risks of using this long term on daily basis. Stop NSAIDs as these may be irritating her gut.     Follow up in 1-2 weeks to go over results and recheck  Work on weight loss  Regular exercise  See Patient Instructions    25 min spent in direct face to face time with this pt, greater than 50% in counseling and coordination of care of above diagnoses      IVONNE Weiss CNP  [facility]

## 2018-06-19 DIAGNOSIS — R10.31 RLQ ABDOMINAL PAIN: ICD-10-CM

## 2018-06-19 PROCEDURE — 87338 HPYLORI STOOL AG IA: CPT | Performed by: NURSE PRACTITIONER

## 2018-06-20 ENCOUNTER — OFFICE VISIT (OUTPATIENT)
Dept: FAMILY MEDICINE | Facility: CLINIC | Age: 58
End: 2018-06-20
Payer: COMMERCIAL

## 2018-06-20 VITALS
TEMPERATURE: 97.8 F | SYSTOLIC BLOOD PRESSURE: 100 MMHG | WEIGHT: 139 LBS | HEART RATE: 66 BPM | DIASTOLIC BLOOD PRESSURE: 70 MMHG | RESPIRATION RATE: 16 BRPM | BODY MASS INDEX: 27.54 KG/M2 | OXYGEN SATURATION: 99 %

## 2018-06-20 DIAGNOSIS — R10.13 DYSPEPSIA: ICD-10-CM

## 2018-06-20 LAB
H PYLORI AG STL QL IA: NORMAL
SPECIMEN SOURCE: NORMAL

## 2018-06-20 PROCEDURE — 99213 OFFICE O/P EST LOW 20 MIN: CPT | Performed by: NURSE PRACTITIONER

## 2018-06-20 RX ORDER — FAMOTIDINE 20 MG/1
20 TABLET, FILM COATED ORAL 2 TIMES DAILY
Qty: 90 TABLET | Refills: 3 | Status: SHIPPED | OUTPATIENT
Start: 2018-06-20 | End: 2021-11-18

## 2018-06-20 NOTE — PROGRESS NOTES
SUBJECTIVE:   Gerardo Valenzuela is a 58 year old female who presents to clinic today for the following health issues:      ABDOMINAL Pain follow up     Onset: 6/12/2018    Description:   Character: currently no pain    Progression of Symptoms:  improving    Accompanying Signs & Symptoms:  Fever/Chills?: no   Gas/Bloating: no   Nausea: no   Vomitting: no   Diarrhea?: no   Constipation:no   Dysuria or Hematuria: no    History:   Trauma: no   Previous similar pain: no    Previous tests done: Stool sample collected     Precipitating factors:   Does the pain change with:     Food: no      BM: no     Urination: no     Alleviating factors:  Not eating spicy oily foods    Therapies Tried and outcome: Pepcid     h pylori result not back yet, pepcid helps doing twice a day   Stopped going out to eat and avoiding greasy foods     Problem list and histories reviewed & adjusted, as indicated.  Additional history: as documented    Patient Active Problem List   Diagnosis     CARDIOVASCULAR SCREENING; LDL GOAL LESS THAN 160     History reviewed. No pertinent surgical history.    Social History   Substance Use Topics     Smoking status: Never Smoker     Smokeless tobacco: Never Used     Alcohol use No     Family History   Problem Relation Age of Onset     Cerebrovascular Disease Mother          Current Outpatient Prescriptions   Medication Sig Dispense Refill     famotidine (PEPCID) 20 MG tablet Take 1 tablet (20 mg) by mouth 2 times daily 90 tablet 3     Allergies   Allergen Reactions     Penicillin [Penicillins]      BP Readings from Last 3 Encounters:   06/20/18 100/70   06/14/18 118/70   12/05/17 120/72    Wt Readings from Last 3 Encounters:   06/20/18 139 lb (63 kg)   06/14/18 142 lb (64.4 kg)   12/05/17 142 lb 11.2 oz (64.7 kg)                    Reviewed and updated as needed this visit by clinical staff       Reviewed and updated as needed this visit by Provider         ROS:  Constitutional, HEENT, cardiovascular,  pulmonary, gi and gu systems are negative, except as otherwise noted.    OBJECTIVE:     /70  Pulse 66  Temp 97.8  F (36.6  C) (Temporal)  Resp 16  Wt 139 lb (63 kg)  SpO2 99%  BMI 27.54 kg/m2  Body mass index is 27.54 kg/(m^2).  GENERAL: healthy, alert and no distress  RESP: Respiratory rate regular and breathing easily   CV: No abnormal color and extremities warm   ABDOMEN: soft, nontender, no hepatosplenomegaly, no masses and bowel sounds normal  MS: no gross musculoskeletal defects noted, no edema  NEURO: Normal strength and tone, mentation intact and speech normal  PSYCH: mentation appears normal, affect normal/bright    Diagnostic Test Results:  none     ASSESSMENT/PLAN:         ICD-10-CM    1. Dyspepsia R10.13 famotidine (PEPCID) 20 MG tablet   controlled with diet changes and pepcid twice a day, refill done     FUTURE APPOINTMENTS:       - Follow-up for annual visit or as needed  Work on weight loss  Regular exercise    IVONNE Weiss CNP  [facility]

## 2018-06-20 NOTE — MR AVS SNAPSHOT
After Visit Summary   6/20/2018    Gerardo Valenzuela    MRN: [id number]           Patient Information     Date Of Birth          1960        Visit Information        Provider Department      6/20/2018 9:20 AM Bibiana aNjera APRN CNP [facility]        Today's Diagnoses     Dyspepsia           Follow-ups after your visit        Who to contact     If you have questions or need follow up information about today's clinic visit or your schedule please contact [facility] directly at [phone].  Normal or non-critical lab and imaging results will be communicated to you by MyChart, letter or phone within 4 business days after the clinic has received the results. If you do not hear from us within 7 days, please contact the clinic through MyChart or phone. If you have a critical or abnormal lab result, we will notify you by phone as soon as possible.  Submit refill requests through [employer] or call your pharmacy and they will forward the refill request to us. Please allow 3 business days for your refill to be completed.          Additional Information About Your Visit        Care EveryWhere ID     This is your Care EveryWhere ID. This could be used by other organizations to access your [facility] medical records  [id number]        Your Vitals Were     Pulse Temperature Respirations Pulse Oximetry BMI (Body Mass Index)       66 97.8  F (36.6  C) (Temporal) 16 99% 27.54 kg/m2        Blood Pressure from Last 3 Encounters:   06/20/18 100/70   06/14/18 118/70   12/05/17 120/72    Weight from Last 3 Encounters:   06/20/18 139 lb (63 kg)   06/14/18 142 lb (64.4 kg)   12/05/17 142 lb 11.2 oz (64.7 kg)              Today, you had the following     No orders found for display         Where to get your medicines      These medications were sent to [facility] [place], [place] - [address]  [address] [address]     Phone:  [phone]      famotidine 20 MG tablet          Primary Care Provider Office Phone # Fax #    [facility] [phone]       [address] [address]        Equal Access to Services     SCOTTIE MCCANN : Hadii aad ku hadcynthiao Solibraali, waaxda luqadaha, qaybta kaalmada adeegyada, waxfaiza idiin cotyn flynn zapien lahannah castillo. So [facility] [phone].    ATENCIÓN: Si habla español, tiene a fuentes disposición servicios gratuitos de asistencia lingüística. Llame al [phone].    We comply with applicable federal civil rights laws and [place] laws. We do not discriminate on the basis of race, color, national origin, age, disability, sex, sexual orientation, or gender identity.            Thank you!     Thank you for choosing [facility]  for your care. Our goal is always to provide you with excellent care. Hearing back from our patients is one way we can continue to improve our services. Please take a few minutes to complete the written survey that you may receive in the mail after your visit with us. Thank you!             Your Updated Medication List - Protect others around you: Learn how to safely use, store and throw away your medicines at www.disposemymeds.org.          This list is accurate as of 6/20/18  9:43 AM.  Always use your most recent med list.                   Brand Name Dispense Instructions for use Diagnosis    famotidine 20 MG tablet    PEPCID    90 tablet    Take 1 tablet (20 mg) by mouth 2 times daily    Dyspepsia

## 2018-07-06 ENCOUNTER — TELEPHONE (OUTPATIENT)
Dept: FAMILY MEDICINE | Facility: CLINIC | Age: 58
End: 2018-07-06

## 2018-07-06 NOTE — TELEPHONE ENCOUNTER
Panel Management Review      Patient has the following on her problem list: None      Composite cancer screening  Chart review shows that this patient is due/due soon for the following Mammogram  Summary:    Patient is due/failing the following:   MAMMOGRAM    Action needed:   Mammogram    Type of outreach:    Sent letter.    Questions for provider review:    None                                                                                                                                    Care team     Chart routed to Care Team .

## 2018-08-07 ENCOUNTER — OFFICE VISIT (OUTPATIENT)
Dept: FAMILY MEDICINE | Facility: CLINIC | Age: 58
End: 2018-08-07
Payer: COMMERCIAL

## 2018-08-07 VITALS
DIASTOLIC BLOOD PRESSURE: 72 MMHG | TEMPERATURE: 98.5 F | HEART RATE: 76 BPM | WEIGHT: 141 LBS | BODY MASS INDEX: 27.94 KG/M2 | SYSTOLIC BLOOD PRESSURE: 120 MMHG

## 2018-08-07 DIAGNOSIS — R35.0 INCREASED FREQUENCY OF URINATION: Primary | ICD-10-CM

## 2018-08-07 DIAGNOSIS — R73.9 ELEVATED BLOOD SUGAR: ICD-10-CM

## 2018-08-07 DIAGNOSIS — R53.83 FATIGUE, UNSPECIFIED TYPE: ICD-10-CM

## 2018-08-07 LAB
ALBUMIN UR-MCNC: NEGATIVE MG/DL
APPEARANCE UR: CLEAR
BACTERIA #/AREA URNS HPF: ABNORMAL /HPF
BASOPHILS # BLD AUTO: 0 10E9/L (ref 0–0.2)
BASOPHILS NFR BLD AUTO: 0.5 %
BILIRUB UR QL STRIP: NEGATIVE
COLOR UR AUTO: YELLOW
DIFFERENTIAL METHOD BLD: NORMAL
EOSINOPHIL # BLD AUTO: 0.2 10E9/L (ref 0–0.7)
EOSINOPHIL NFR BLD AUTO: 2.4 %
ERYTHROCYTE [DISTWIDTH] IN BLOOD BY AUTOMATED COUNT: 13.5 % (ref 10–15)
GLUCOSE UR STRIP-MCNC: NEGATIVE MG/DL
HBA1C MFR BLD: 5.8 % (ref 0–5.6)
HCT VFR BLD AUTO: 40.5 % (ref 35–47)
HGB BLD-MCNC: 13.6 G/DL (ref 11.7–15.7)
HGB UR QL STRIP: NEGATIVE
KETONES UR STRIP-MCNC: NEGATIVE MG/DL
LEUKOCYTE ESTERASE UR QL STRIP: ABNORMAL
LYMPHOCYTES # BLD AUTO: 2 10E9/L (ref 0.8–5.3)
LYMPHOCYTES NFR BLD AUTO: 30.6 %
MCH RBC QN AUTO: 30.4 PG (ref 26.5–33)
MCHC RBC AUTO-ENTMCNC: 33.6 G/DL (ref 31.5–36.5)
MCV RBC AUTO: 90 FL (ref 78–100)
MONOCYTES # BLD AUTO: 0.6 10E9/L (ref 0–1.3)
MONOCYTES NFR BLD AUTO: 9.3 %
NEUTROPHILS # BLD AUTO: 3.8 10E9/L (ref 1.6–8.3)
NEUTROPHILS NFR BLD AUTO: 57.2 %
NITRATE UR QL: NEGATIVE
NON-SQ EPI CELLS #/AREA URNS LPF: ABNORMAL /LPF
PH UR STRIP: 6.5 PH (ref 5–7)
PLATELET # BLD AUTO: 270 10E9/L (ref 150–450)
RBC # BLD AUTO: 4.48 10E12/L (ref 3.8–5.2)
RBC #/AREA URNS AUTO: ABNORMAL /HPF
SOURCE: ABNORMAL
SP GR UR STRIP: <=1.005 (ref 1–1.03)
UROBILINOGEN UR STRIP-ACNC: 0.2 EU/DL (ref 0.2–1)
WBC # BLD AUTO: 6.6 10E9/L (ref 4–11)
WBC #/AREA URNS AUTO: ABNORMAL /HPF

## 2018-08-07 PROCEDURE — 99214 OFFICE O/P EST MOD 30 MIN: CPT | Performed by: NURSE PRACTITIONER

## 2018-08-07 PROCEDURE — 84443 ASSAY THYROID STIM HORMONE: CPT | Performed by: NURSE PRACTITIONER

## 2018-08-07 PROCEDURE — 36415 COLL VENOUS BLD VENIPUNCTURE: CPT | Performed by: NURSE PRACTITIONER

## 2018-08-07 PROCEDURE — 83036 HEMOGLOBIN GLYCOSYLATED A1C: CPT | Performed by: NURSE PRACTITIONER

## 2018-08-07 PROCEDURE — 85025 COMPLETE CBC W/AUTO DIFF WBC: CPT | Performed by: NURSE PRACTITIONER

## 2018-08-07 PROCEDURE — 81001 URINALYSIS AUTO W/SCOPE: CPT | Performed by: NURSE PRACTITIONER

## 2018-08-07 PROCEDURE — 82306 VITAMIN D 25 HYDROXY: CPT | Performed by: NURSE PRACTITIONER

## 2018-08-07 PROCEDURE — 80053 COMPREHEN METABOLIC PANEL: CPT | Performed by: NURSE PRACTITIONER

## 2018-08-07 NOTE — PROGRESS NOTES
SUBJECTIVE:   Gerardo Valenzuela is a 58 year old female who presents to clinic today for the following health issues:    Concern - Dry mouth, tiredness, and frequent urination  Onset: 1 week    Description:   Check blood sugar levels with 's monitor. Result was 144 on last Friday 147 on Saturday 137 on Sunday. All were check in the morning before meals.    Intensity: moderate    Progression of Symptoms:  Worsening      Therapies Tried and outcome: None   Also has some sharp shooting pains in her feet sometimes that go up front of leg without any injuries or other causes    Eating better since her kids got involved, got rid of her rices and pasta and making her green veggies, salmon, etc       Problem list and histories reviewed & adjusted, as indicated.  Additional history: as documented    Patient Active Problem List   Diagnosis     CARDIOVASCULAR SCREENING; LDL GOAL LESS THAN 160     History reviewed. No pertinent surgical history.    Social History   Substance Use Topics     Smoking status: Never Smoker     Smokeless tobacco: Never Used     Alcohol use No     Family History   Problem Relation Age of Onset     Cerebrovascular Disease Mother          Current Outpatient Prescriptions   Medication Sig Dispense Refill     famotidine (PEPCID) 20 MG tablet Take 1 tablet (20 mg) by mouth 2 times daily 90 tablet 3     Allergies   Allergen Reactions     Penicillin [Penicillins]      Recent Labs   Lab Test  12/04/17   1218  11/27/13   0636  11/26/13   2357  02/15/11   0931   LDL  140*  110   --   119   HDL  37*  35*   --   34*   TRIG  227*  154*   --   171*   ALT   --   24   --   23   CR   --   0.50*  0.62  0.54   GFRESTIMATED   --   >90  >90  >90   GFRESTBLACK   --   >90  >90  >90   POTASSIUM   --   4.1  3.8  3.7   TSH   --    --    --   0.76      BP Readings from Last 3 Encounters:   08/07/18 120/72   06/20/18 100/70   06/14/18 118/70    Wt Readings from Last 3 Encounters:   08/07/18 141 lb (64 kg)   06/20/18  139 lb (63 kg)   06/14/18 142 lb (64.4 kg)                  Labs reviewed in EPIC    Reviewed and updated as needed this visit by clinical staff       Reviewed and updated as needed this visit by Provider         ROS:  Constitutional, HEENT, cardiovascular, pulmonary, GI, , musculoskeletal, neuro, skin, endocrine and psych systems are negative, except as otherwise noted.    OBJECTIVE:     /72  Pulse 76  Temp 98.5  F (36.9  C) (Oral)  Wt 141 lb (64 kg)  BMI 27.94 kg/m2  Body mass index is 27.94 kg/(m^2).  GENERAL: healthy, overweight, appears fatigued but alert and no distress  RESP: Respiratory rate regular and breathing easily   CV: No abnormal color and extremities warm   MS: no gross musculoskeletal defects noted, no edema  SKIN: no suspicious lesions or rashes  NEURO: Normal strength and tone, mentation intact and speech normal  PSYCH: mentation appears normal, affect normal/bright    Diagnostic Test Results:  Results for orders placed or performed in visit on 08/07/18 (from the past 24 hour(s))   UA reflex to Microscopic and Culture   Result Value Ref Range    Color Urine Yellow     Appearance Urine Clear     Glucose Urine Negative NEG^Negative mg/dL    Bilirubin Urine Negative NEG^Negative    Ketones Urine Negative NEG^Negative mg/dL    Specific Gravity Urine <=1.005 1.003 - 1.035    Blood Urine Negative NEG^Negative    pH Urine 6.5 5.0 - 7.0 pH    Protein Albumin Urine Negative NEG^Negative mg/dL    Urobilinogen Urine 0.2 0.2 - 1.0 EU/dL    Nitrite Urine Negative NEG^Negative    Leukocyte Esterase Urine Trace (A) NEG^Negative    Source Midstream Urine    Urine Microscopic   Result Value Ref Range    WBC Urine 0 - 5 OTO5^0 - 5 /HPF    RBC Urine O - 2 OTO2^O - 2 /HPF    Squamous Epithelial /LPF Urine Few FEW^Few /LPF    Bacteria Urine Few (A) NEG^Negative /HPF       ASSESSMENT/PLAN:         ICD-10-CM    1. Increased frequency of urination R35.0 UA reflex to Microscopic and Culture     Urine  Microscopic     Hemoglobin A1c   2. Elevated blood sugar R73.9 Comprehensive metabolic panel     Hemoglobin A1c   3. Fatigue, unspecified type R53.83 Comprehensive metabolic panel     TSH with free T4 reflex     CBC with platelets differential     Vitamin D Deficiency   pt here to work-up frequency and nerve pains in lower legs, wanted to check for urinary tract infection and DM  Findings with trace leukocytes and no WBCs discussed no urinary tract infection   Is obese and Has been working on better diet and exercise for DM prevention, has been checking blood sugars on her family's meter and have been elevated, however does not sound like checking at regular times or necessarily before meals.   Will do lab work to work-up fatigue and causes, follow up as indicated. Continue to work on healthier diet and getting exercise, weight loss. If leg pain needs separate visit encouraged she return to discuss in more detail jarrod woodson at this time. See below     Patient Instructions   Today we will do lab work to look for causes of your symptoms and your elevated blood sugars. You do not have a urinary tract infection.     Please Return in 1 week to go over results, until then get plenty of rest and continue eating well. Return sooner if any new symptoms or concerns    35 min spent in direct face to face time with this pt, greater than 50% in counseling and coordination of care of above diagnoses    IVONNE Weiss [facility]

## 2018-08-07 NOTE — PATIENT INSTRUCTIONS
Today we will do lab work to look for causes of your symptoms and your elevated blood sugars. You do not have a urinary tract infection.     Please Return in 1 week to go over results, until then get plenty of rest and continue eating well. Return sooner if any new symptoms or concerns

## 2018-08-07 NOTE — MR AVS SNAPSHOT
After Visit Summary   8/7/2018    Gerardo Valenzuela    MRN: [id number]           Patient Information     Date Of Birth          1960        Visit Information        Provider Department      8/7/2018 4:40 PM Bibiana Najera APRN CNP [facility]        Today's Diagnoses     Increased frequency of urination    -  1    Elevated blood sugar        Fatigue, unspecified type          Care Instructions    Today we will do lab work to look for causes of your symptoms and your elevated blood sugars. You do not have a urinary tract infection.     Please Return in 1 week to go over results, until then get plenty of rest and continue eating well. Return sooner if any new symptoms or concerns          Follow-ups after your visit        Who to contact     If you have questions or need follow up information about today's clinic visit or your schedule please contact [facility] directly at [phone].  Normal or non-critical lab and imaging results will be communicated to you by MyChart, letter or phone within 4 business days after the clinic has received the results. If you do not hear from us within 7 days, please contact the clinic through MyChart or phone. If you have a critical or abnormal lab result, we will notify you by phone as soon as possible.  Submit refill requests through [employer] or call your pharmacy and they will forward the refill request to us. Please allow 3 business days for your refill to be completed.          Additional Information About Your Visit        Care EveryWhere ID     This is your Care EveryWhere ID. This could be used by other organizations to access your [facility] medical records  [id number]        Your Vitals Were     Pulse Temperature BMI (Body Mass Index)             76 98.5  F (36.9  C) (Oral) 27.94 kg/m2          Blood Pressure from Last 3 Encounters:   08/07/18 120/72   06/20/18 100/70   06/14/18 118/70    Weight from Last 3 Encounters:    08/07/18 141 lb (64 kg)   06/20/18 139 lb (63 kg)   06/14/18 142 lb (64.4 kg)              We Performed the Following     CBC with platelets differential     Comprehensive metabolic panel     Hemoglobin A1c     TSH with free T4 reflex     UA reflex to Microscopic and Culture     Urine Microscopic     Vitamin D Deficiency        Primary Care Provider Office Phone # Fax #    [facility] [phone]       [address] [address]        Equal Access to Services     SCOTTIE MCCANN : Hadii aad ku hadasho Soomaali, waaxda luqadaha, qaybta kaalmada adeegyada, waxay idiin hayaan adeeg kharash lahannah castillo. So [facility] [phone].    ATENCIÓN: Si habla español, tiene a fuentes disposición servicios gratuitos de asistencia lingüística. [facility] [phone].    We comply with applicable federal civil rights laws and [place] laws. We do not discriminate on the basis of race, color, national origin, age, disability, sex, sexual orientation, or gender identity.            Thank you!     Thank you for choosing [facility]  for your care. Our goal is always to provide you with excellent care. Hearing back from our patients is one way we can continue to improve our services. Please take a few minutes to complete the written survey that you may receive in the mail after your visit with us. Thank you!             Your Updated Medication List - Protect others around you: Learn how to safely use, store and throw away your medicines at www.disposemymeds.org.          This list is accurate as of 8/7/18  5:25 PM.  Always use your most recent med list.                   Brand Name Dispense Instructions for use Diagnosis    famotidine 20 MG tablet    PEPCID    90 tablet    Take 1 tablet (20 mg) by mouth 2 times daily    Dyspepsia

## 2018-08-08 LAB
ALBUMIN SERPL-MCNC: 3.8 G/DL (ref 3.4–5)
ALP SERPL-CCNC: 72 U/L (ref 40–150)
ALT SERPL W P-5'-P-CCNC: 21 U/L (ref 0–50)
ANION GAP SERPL CALCULATED.3IONS-SCNC: 10 MMOL/L (ref 3–14)
AST SERPL W P-5'-P-CCNC: 22 U/L (ref 0–45)
BILIRUB SERPL-MCNC: 0.3 MG/DL (ref 0.2–1.3)
BUN SERPL-MCNC: 14 MG/DL (ref 7–30)
CALCIUM SERPL-MCNC: 9 MG/DL (ref 8.5–10.1)
CHLORIDE SERPL-SCNC: 105 MMOL/L (ref 94–109)
CO2 SERPL-SCNC: 24 MMOL/L (ref 20–32)
CREAT SERPL-MCNC: 0.67 MG/DL (ref 0.52–1.04)
GFR SERPL CREATININE-BSD FRML MDRD: 90 ML/MIN/1.7M2
GLUCOSE SERPL-MCNC: 93 MG/DL (ref 70–99)
POTASSIUM SERPL-SCNC: 4.1 MMOL/L (ref 3.4–5.3)
PROT SERPL-MCNC: 7.5 G/DL (ref 6.8–8.8)
SODIUM SERPL-SCNC: 139 MMOL/L (ref 133–144)
TSH SERPL DL<=0.005 MIU/L-ACNC: 1.14 MU/L (ref 0.4–4)

## 2018-08-09 LAB — DEPRECATED CALCIDIOL+CALCIFEROL SERPL-MC: 14 UG/L (ref 20–75)

## 2018-08-20 ENCOUNTER — OFFICE VISIT (OUTPATIENT)
Dept: FAMILY MEDICINE | Facility: CLINIC | Age: 58
End: 2018-08-20
Payer: COMMERCIAL

## 2018-08-20 VITALS
TEMPERATURE: 98.6 F | SYSTOLIC BLOOD PRESSURE: 106 MMHG | OXYGEN SATURATION: 99 % | HEIGHT: 61 IN | HEART RATE: 85 BPM | DIASTOLIC BLOOD PRESSURE: 70 MMHG | BODY MASS INDEX: 26.85 KG/M2 | WEIGHT: 142.2 LBS

## 2018-08-20 DIAGNOSIS — E78.2 MIXED HYPERLIPIDEMIA: ICD-10-CM

## 2018-08-20 DIAGNOSIS — R73.03 PREDIABETES: ICD-10-CM

## 2018-08-20 DIAGNOSIS — E66.3 OVERWEIGHT: ICD-10-CM

## 2018-08-20 DIAGNOSIS — E55.9 VITAMIN D DEFICIENCY: Primary | ICD-10-CM

## 2018-08-20 PROCEDURE — 99213 OFFICE O/P EST LOW 20 MIN: CPT | Performed by: NURSE PRACTITIONER

## 2018-08-20 NOTE — PATIENT INSTRUCTIONS
Please come fasting to your next physical in 3-4 months and we will recheck your cholesterol at that time as well as the A1C.   After you finish with with weekly Vitamin D, take 5000 units daily.   Vitamin D  Does this test have other names?  25-hydroxyvitamin D (25-high-DROX-ee-VIE-tuh-min D), 25(OH)D  What is this test?  Vitamin D is mainly found in fortified dairy foods, juice, breakfast cereal, and certain fish. This vitamin plays many roles in the body. But because it helps the body absorb calcium from foods and supplements, it's particularly important for bone health. Vitamin D has many additional roles in the body.  Vitamin D comes in several forms. When ultraviolet light, such as sunlight, hits your skin, it creates vitamin D3. D2 is used to fortify dairy foods. Both of these are further processed by your liver and kidneys into a form your body can use. Most tests for vitamin D check the level of a form circulating in the body called 25-hydroxyvitamin D, also called 25(OH)D.   Why do I need this test?  You may need this test if your healthcare provider wants to check your vitamin D levels to find out if you have any risks to bone health. These might be:    Low calcium    Soft bones caused by low vitamin D or problems using it (osteomalacia)    Osteopenia    Osteoporosis    Rickets, in children  You may also need this test if you are at risk for low vitamin D levels. Risks include:    Being an older adult    Having difficulty absorbing fat from your diet    Having chronic kidney disease    Have dark skin pigmentation    Being a  baby  Vitamin D has many effects in the body. You may need this test to help your healthcare provider diagnose or treat:    Problems with the parathyroid gland    Cancer    Autoimmune diseases, such as multiple sclerosis and Crohn's disease    Psoriasis    Asthma    Weakness or falls    What other tests might I have along with this test?  A healthcare provider may also want  to check your parathyroid hormone levels and your calcium levels.   What do my test results mean?  Test results may vary depending on your age, gender, health history, the method used for the test, and other things. Your test results may not mean you have a problem. Ask your healthcare provider what your test results mean for you.   Children and adults need more than 30 nanograms per milliliter (ng/ml) of vitamin D. The optimal level of 25(OH)D is usually between 30 and 60 ng/mL. Recommended daily amounts range from 400 to 800 international units (IU) per day based on your age.  Levels lower than normal can mean you are:    Not making enough vitamin D on your own    Not getting enough vitamin D in your diet    Not absorbing vitamin D from your food as you should  Lower levels may also mean that your body is not converting the vitamin as it should. This might be because of kidney or liver disease.  Above-normal levels may be a sign that you're taking too much in supplement form.   How is this test done?  The test is done with a blood sample. A needle is used to draw blood from a vein in your arm or hand.   Does this test pose any risks?  Having a blood test with a needle carries some risks. These include bleeding, infection, bruising, and feeling lightheaded. When the needle pricks your arm or hand, you may feel a slight sting or pain. Afterward, the site may be sore.   What might affect my test results?  The amount of time you spend in the sunlight, your diet, and whether you take vitamin D in supplement form can affect your vitamin D levels. Ask your healthcare provider if any health conditions you have or medicines you take could affect your results.  How do I get ready for this test?  Tell your healthcare provider if you take vitamin D supplements. Be sure your healthcare provider knows about all medicines, herbs, vitamins, and supplements you are taking. This includes medicines that don't need a prescription and  any illicit drugs you may use.     6400-0775 [employer]. [address] [place] [address], [place] [address]. All rights reserved. This information is not intended as a substitute for professional medical care. Always follow your healthcare professional's instructions.        Prediabetes  You have been diagnosed with prediabetes. This means that the level of sugar (glucose) in your blood is too high. If you have prediabetes, you are at risk for developing type 2 diabetes. Type 2 diabetes is diagnosed when the level of glucose in the blood reaches a certain high level. With prediabetes, it hasn t reached this point yet, but it is higher than normal. It is vital to make lifestyle changes to lower your blood sugar, improve your health, and prevent diabetes. This sheet will tell you more.      Why worry about prediabetes?  Prediabetes is a disease where the body s cells have trouble using glucose in the blood for energy. As a result, too much glucose stays in the blood and can affect how your heart and blood vessels work. Without changes in diet and lifestyle, the problem can get worse. Once you have type 2 diabetes, it is chronic (ongoing) and needs to be managed for the rest of your life. Diabetes can harm the body and your health by damaging organs, such as your eyes and kidneys. It makes you more likely to have heart disease. And it can damage nerves and blood vessels.  Who is a risk for prediabetes?  The exact cause of prediabetes is not clear. But certain risk factors make a person more likely to have it. These include:    A family history of type 2 diabetes    Being overweight    Being over age 45    Have hypertension or elevated cholesterol     Having had gestational diabetes    Not being physically active    Being ,  American, , , , or   Diagnosing prediabetes  Prediabetes may have no symptoms or you may have some of the symptoms of  diabetes. The diagnosis is made with a blood test. You may have one or more of these blood tests:     Fasting glucose test. Blood is taken and tested after you have fasted (not eaten) for at least 8 hours. A normal test result is 99 milligrams per deciliter (mg/dL) or lower. Prediabetes is 100 mg/dL to 125 mg/dL. Diabetes is 126 mg/dL or higher.    Glucose tolerance test. Your blood sugar is measured before and after you drink a very sugary liquid. A normal test result is 139 milligrams per deciliter (mg/dL) or lower. Prediabetes is 140 mg/dL to 199 mg/dL. Diabetes is 200 mg/dL or higher.    Hemoglobin A1c (HbA1c). Your HbA1c is normal if it is below 5.7%. Prediabetes is 5.7% to 6.4%. Diabetes is 6.5% or higher.   Treating prediabetes  The best way to treat prediabetes is to lose at least 5% to 7% of your current weight and be more physically active by getting at least 150 minutes a week of physical activity. When sitting for long periods of time, get up for short sessions of light activity every 30 minutes. These changes help the body s cells use blood sugar better. Even a small amount of weight loss can help. Work with your healthcare provider to make a plan to eat well and be more active. Keep in mind that small changes can add up. Other changes in your lifestyle (or even taking certain medicines, such as metformin) may make you less likely to develop diabetes. Your healthcare provider can talk with you about these.  Follow-up  If it is untreated, prediabetes can turn into diabetes. This is a serious health condition. Take steps to stop this from happening. Follow the treatment plan you have been given. You may have your blood glucose tested again in about 12 to 18 months.  Symptoms of diabetes  Let your healthcare provider know if you have any of the following:    Always feel very tired    Feel very thirsty or hungry much of the time    Have to urinate often    Lose weight for no reason    Feel numbness or  tingling in your fingers or toes    Have cuts or bruises that don t seem to heal    Have blurry vision   Date Last Reviewed: 5/1/2016 2000-2017 [employer]. [address] [place] [address], [place] [address]. All rights reserved. This information is not intended as a substitute for professional medical care. Always follow your healthcare professional's instructions.

## 2018-08-20 NOTE — PROGRESS NOTES
SUBJECTIVE:   Gerardo Valenzuela is a 58 year old female who presents to clinic today for the following health issues:    Patient is here to review test results.   Feeling better overall, fatigue improving with lifestyle changes    Prediabetes --continues to work on exercise walking and better eating, working to lose some weight too    Cholesterol dyslipidemia last checked in Dec 2017    Vit D low, not taking any, will replace today     Legs feel better with heat, minimal tylenol and ibuprofen prn.     Problem list and histories reviewed & adjusted, as indicated.  Additional history: as documented    Patient Active Problem List   Diagnosis     CARDIOVASCULAR SCREENING; LDL GOAL LESS THAN 160     Prediabetes     Vitamin D deficiency     History reviewed. No pertinent surgical history.    Social History   Substance Use Topics     Smoking status: Never Smoker     Smokeless tobacco: Never Used     Alcohol use No     Family History   Problem Relation Age of Onset     Cerebrovascular Disease Mother          Current Outpatient Prescriptions   Medication Sig Dispense Refill     cholecalciferol (VITAMIN D3) 60781 units capsule Take 1 capsule (50,000 Units) by mouth once a week 8 capsule 0     famotidine (PEPCID) 20 MG tablet Take 1 tablet (20 mg) by mouth 2 times daily 90 tablet 3     Allergies   Allergen Reactions     Penicillin [Penicillins]      Recent Labs   Lab Test  08/07/18   1720  12/04/17   1218  11/27/13   0636   02/15/11   0931   A1C  5.8*   --    --    --    --    LDL   --   140*  110   --   119   HDL   --   37*  35*   --   34*   TRIG   --   227*  154*   --   171*   ALT  21   --   24   --   23   CR  0.67   --   0.50*   < >  0.54   GFRESTIMATED  90   --   >90   < >  >90   GFRESTBLACK  >90   --   >90   < >  >90   POTASSIUM  4.1   --   4.1   < >  3.7   TSH  1.14   --    --    --   0.76    < > = values in this interval not displayed.      BP Readings from Last 3 Encounters:   08/20/18 106/70   08/07/18 120/72  "  06/20/18 100/70    Wt Readings from Last 3 Encounters:   08/20/18 142 lb 3.2 oz (64.5 kg)   08/07/18 141 lb (64 kg)   06/20/18 139 lb (63 kg)                  Labs reviewed in EPIC    Reviewed and updated as needed this visit by clinical staff       Reviewed and updated as needed this visit by Provider         ROS:  Constitutional, HEENT, cardiovascular, pulmonary, GI, , musculoskeletal, neuro, skin, endocrine and psych systems are negative, except as otherwise noted.    OBJECTIVE:     /70  Pulse 85  Temp 98.6  F (37  C) (Oral)  Ht 5' 0.83\" (1.545 m)  Wt 142 lb 3.2 oz (64.5 kg)  SpO2 99%  BMI 27.02 kg/m2  Body mass index is 27.02 kg/(m^2).  GENERAL: healthy, alert and no distress  RESP: Respiratory rate regular and breathing easily   CV: No abnormal color and extremities warm   MS: no gross musculoskeletal defects noted, no edema  NEURO: Normal strength and tone, mentation intact and speech normal  PSYCH: mentation appears normal, affect normal/bright    Diagnostic Test Results:  none     ASSESSMENT/PLAN:         ICD-10-CM    1. Vitamin D deficiency E55.9 cholecalciferol (VITAMIN D3) 89928 units capsule   2. Prediabetes R73.03    3. Mixed hyperlipidemia E78.2    4. Overweight E66.3    continue excellent lifestyle changes, health maintenance exam with fasting lab work and follow up sooner prn     Patient Instructions       Please come fasting to your next physical in 3-4 months and we will recheck your cholesterol at that time as well as the A1C.   After you finish with with weekly Vitamin D, take 5000 units daily.   Vitamin D  Does this test have other names?  25-hydroxyvitamin D (25-high-DROX-ee-VIE-tuh-min D), 25(OH)D  What is this test?  Vitamin D is mainly found in fortified dairy foods, juice, breakfast cereal, and certain fish. This vitamin plays many roles in the body. But because it helps the body absorb calcium from foods and supplements, it's particularly important for bone health. Vitamin " D has many additional roles in the body.  Vitamin D comes in several forms. When ultraviolet light, such as sunlight, hits your skin, it creates vitamin D3. D2 is used to fortify dairy foods. Both of these are further processed by your liver and kidneys into a form your body can use. Most tests for vitamin D check the level of a form circulating in the body called 25-hydroxyvitamin D, also called 25(OH)D.   Why do I need this test?  You may need this test if your healthcare provider wants to check your vitamin D levels to find out if you have any risks to bone health. These might be:    Low calcium    Soft bones caused by low vitamin D or problems using it (osteomalacia)    Osteopenia    Osteoporosis    Rickets, in children  You may also need this test if you are at risk for low vitamin D levels. Risks include:    Being an older adult    Having difficulty absorbing fat from your diet    Having chronic kidney disease    Have dark skin pigmentation    Being a  baby  Vitamin D has many effects in the body. You may need this test to help your healthcare provider diagnose or treat:    Problems with the parathyroid gland    Cancer    Autoimmune diseases, such as multiple sclerosis and Crohn's disease    Psoriasis    Asthma    Weakness or falls    What other tests might I have along with this test?  A healthcare provider may also want to check your parathyroid hormone levels and your calcium levels.   What do my test results mean?  Test results may vary depending on your age, gender, health history, the method used for the test, and other things. Your test results may not mean you have a problem. Ask your healthcare provider what your test results mean for you.   Children and adults need more than 30 nanograms per milliliter (ng/ml) of vitamin D. The optimal level of 25(OH)D is usually between 30 and 60 ng/mL. Recommended daily amounts range from 400 to 800 international units (IU) per day based on your  age.  Levels lower than normal can mean you are:    Not making enough vitamin D on your own    Not getting enough vitamin D in your diet    Not absorbing vitamin D from your food as you should  Lower levels may also mean that your body is not converting the vitamin as it should. This might be because of kidney or liver disease.  Above-normal levels may be a sign that you're taking too much in supplement form.   How is this test done?  The test is done with a blood sample. A needle is used to draw blood from a vein in your arm or hand.   Does this test pose any risks?  Having a blood test with a needle carries some risks. These include bleeding, infection, bruising, and feeling lightheaded. When the needle pricks your arm or hand, you may feel a slight sting or pain. Afterward, the site may be sore.   What might affect my test results?  The amount of time you spend in the sunlight, your diet, and whether you take vitamin D in supplement form can affect your vitamin D levels. Ask your healthcare provider if any health conditions you have or medicines you take could affect your results.  How do I get ready for this test?  Tell your healthcare provider if you take vitamin D supplements. Be sure your healthcare provider knows about all medicines, herbs, vitamins, and supplements you are taking. This includes medicines that don't need a prescription and any illicit drugs you may use.     6800-5579 [employer]. [address] [place], [place] [address]. All rights reserved. This information is not intended as a substitute for professional medical care. Always follow your healthcare professional's instructions.        Prediabetes  You have been diagnosed with prediabetes. This means that the level of sugar (glucose) in your blood is too high. If you have prediabetes, you are at risk for developing type 2 diabetes. Type 2 diabetes is diagnosed when the level of glucose in the blood reaches a certain high level.  With prediabetes, it hasn t reached this point yet, but it is higher than normal. It is vital to make lifestyle changes to lower your blood sugar, improve your health, and prevent diabetes. This sheet will tell you more.      Why worry about prediabetes?  Prediabetes is a disease where the body s cells have trouble using glucose in the blood for energy. As a result, too much glucose stays in the blood and can affect how your heart and blood vessels work. Without changes in diet and lifestyle, the problem can get worse. Once you have type 2 diabetes, it is chronic (ongoing) and needs to be managed for the rest of your life. Diabetes can harm the body and your health by damaging organs, such as your eyes and kidneys. It makes you more likely to have heart disease. And it can damage nerves and blood vessels.  Who is a risk for prediabetes?  The exact cause of prediabetes is not clear. But certain risk factors make a person more likely to have it. These include:    A family history of type 2 diabetes    Being overweight    Being over age 45    Have hypertension or elevated cholesterol     Having had gestational diabetes    Not being physically active    Being ,  American, , , , or   Diagnosing prediabetes  Prediabetes may have no symptoms or you may have some of the symptoms of diabetes. The diagnosis is made with a blood test. You may have one or more of these blood tests:     Fasting glucose test. Blood is taken and tested after you have fasted (not eaten) for at least 8 hours. A normal test result is 99 milligrams per deciliter (mg/dL) or lower. Prediabetes is 100 mg/dL to 125 mg/dL. Diabetes is 126 mg/dL or higher.    Glucose tolerance test. Your blood sugar is measured before and after you drink a very sugary liquid. A normal test result is 139 milligrams per deciliter (mg/dL) or lower. Prediabetes is 140 mg/dL to 199 mg/dL. Diabetes is 200  mg/dL or higher.    Hemoglobin A1c (HbA1c). Your HbA1c is normal if it is below 5.7%. Prediabetes is 5.7% to 6.4%. Diabetes is 6.5% or higher.   Treating prediabetes  The best way to treat prediabetes is to lose at least 5% to 7% of your current weight and be more physically active by getting at least 150 minutes a week of physical activity. When sitting for long periods of time, get up for short sessions of light activity every 30 minutes. These changes help the body s cells use blood sugar better. Even a small amount of weight loss can help. Work with your healthcare provider to make a plan to eat well and be more active. Keep in mind that small changes can add up. Other changes in your lifestyle (or even taking certain medicines, such as metformin) may make you less likely to develop diabetes. Your healthcare provider can talk with you about these.  Follow-up  If it is untreated, prediabetes can turn into diabetes. This is a serious health condition. Take steps to stop this from happening. Follow the treatment plan you have been given. You may have your blood glucose tested again in about 12 to 18 months.  Symptoms of diabetes  Let your healthcare provider know if you have any of the following:    Always feel very tired    Feel very thirsty or hungry much of the time    Have to urinate often    Lose weight for no reason    Feel numbness or tingling in your fingers or toes    Have cuts or bruises that don t seem to heal    Have blurry vision   Date Last Reviewed: 5/1/2016 2000-2017 [employer]. [address] [place] [address], [place] [address]. All rights reserved. This information is not intended as a substitute for professional medical care. Always follow your healthcare professional's instructions.            IVONNE Weiss [facility]

## 2018-09-25 ENCOUNTER — RADIANT APPOINTMENT (OUTPATIENT)
Dept: MAMMOGRAPHY | Facility: CLINIC | Age: 58
End: 2018-09-25
Attending: NURSE PRACTITIONER
Payer: COMMERCIAL

## 2018-09-25 DIAGNOSIS — Z12.31 VISIT FOR SCREENING MAMMOGRAM: ICD-10-CM

## 2018-09-25 PROCEDURE — 77067 SCR MAMMO BI INCL CAD: CPT

## 2018-10-29 ENCOUNTER — OFFICE VISIT (OUTPATIENT)
Dept: FAMILY MEDICINE | Facility: CLINIC | Age: 58
End: 2018-10-29
Payer: COMMERCIAL

## 2018-10-29 VITALS — DIASTOLIC BLOOD PRESSURE: 69 MMHG | TEMPERATURE: 98 F | SYSTOLIC BLOOD PRESSURE: 113 MMHG

## 2018-10-29 DIAGNOSIS — Z23 NEED FOR VACCINATION: ICD-10-CM

## 2018-10-29 DIAGNOSIS — Z71.84 TRAVEL ADVICE ENCOUNTER: Primary | ICD-10-CM

## 2018-10-29 PROCEDURE — 90472 IMMUNIZATION ADMIN EACH ADD: CPT | Mod: GA | Performed by: NURSE PRACTITIONER

## 2018-10-29 PROCEDURE — 90691 TYPHOID VACCINE IM: CPT | Mod: GA | Performed by: NURSE PRACTITIONER

## 2018-10-29 PROCEDURE — 90632 HEPA VACCINE ADULT IM: CPT | Mod: GA | Performed by: NURSE PRACTITIONER

## 2018-10-29 PROCEDURE — 90734 MENACWYD/MENACWYCRM VACC IM: CPT | Mod: GA | Performed by: NURSE PRACTITIONER

## 2018-10-29 PROCEDURE — 90471 IMMUNIZATION ADMIN: CPT | Mod: GA | Performed by: NURSE PRACTITIONER

## 2018-10-29 PROCEDURE — 99402 PREV MED CNSL INDIV APPRX 30: CPT | Mod: 25 | Performed by: NURSE PRACTITIONER

## 2018-10-29 RX ORDER — AZITHROMYCIN 500 MG/1
500 TABLET, FILM COATED ORAL DAILY
Qty: 3 TABLET | Refills: 0 | Status: SHIPPED | OUTPATIENT
Start: 2018-10-29 | End: 2018-11-01

## 2018-10-29 RX ORDER — ATOVAQUONE AND PROGUANIL HYDROCHLORIDE 250; 100 MG/1; MG/1
1 TABLET, FILM COATED ORAL DAILY
Qty: 25 TABLET | Refills: 0 | Status: SHIPPED | OUTPATIENT
Start: 2018-10-29 | End: 2019-10-04

## 2018-10-29 NOTE — NURSING NOTE
"Chief Complaint   Patient presents with     [facility]     initial /69  Temp 98  F (36.7  C) (Oral) Estimated body mass index is 27.02 kg/(m^2) as calculated from the following:    Height as of 8/20/18: 5' 0.83\" (1.545 m).    Weight as of 8/20/18: 142 lb 3.2 oz (64.5 kg).  BP completed using cuff size: regular.  L  arm      Health Maintenance that is potentially due pending provider review:  NONE    n/a    Tony Bucio ma  "

## 2018-10-29 NOTE — PROGRESS NOTES
Nurse Note      Itinerary:  Hospitals in [place]      Departure Date: 11/1/18      Return Date: 12/3/18      Length of Trip 1 month      Reason for Travel: Visiting friends and relatives           Urban or rural: both      Accommodations: Hotel        IMMUNIZATION HISTORY  Have you received any immunizations within the past 4 weeks?  No  Have you ever fainted from having your blood drawn or from an injection?  No  Have you ever had a fever reaction to vaccination?  No  Have you ever had any bad reaction or side effect from any vaccination?  No  Have you ever had hepatitis A or B vaccine?  Yes  Do you live (or work closely) with anyone who has AIDS, an AIDS-like condition, any other immune disorder or who is on chemotherapy for cancer?  No  Do you have a family history of immunodeficiency?  No  Have you received any injection of immune globulin or any blood products during the past 12 months?  No    Patient roomed by Tony Bill  Gerardo Valenzuela is a 58 year old female seen today with family member for counsultation for international travel to [facility] for Visiting friends and relatives.  Patient will be departing in  3 day(s) and staying for   3 week(s) and  traveling with family member(s).      Patient itinerary :  [place] then taking a 2 day excursion to [facility] and a 3 day excursion to [facility] which presents risk for Malaria. exposure.      Patient's activities will include visiting friends and relatives.    Patient's country of birth is [place]      Special medical concerns: none  Pre-travel questionnaire was completed by patient and reviewed by provider.     Vitals: /69  Temp 98  F (36.7  C) (Oral)  BMI= There is no height or weight on file to calculate BMI.    EXAM:  General:  Well-nourished, well-developed in no acute distress.  Appears to be stated age, interacts appropriately and expresses understanding of information given to patient.    Current  Outpatient Prescriptions   Medication Sig Dispense Refill     cholecalciferol (VITAMIN D3) 22702 units capsule Take 1 capsule (50,000 Units) by mouth once a week 8 capsule 0     famotidine (PEPCID) 20 MG tablet Take 1 tablet (20 mg) by mouth 2 times daily 90 tablet 3     Patient Active Problem List   Diagnosis     CARDIOVASCULAR SCREENING; LDL GOAL LESS THAN 160     Prediabetes     Vitamin D deficiency     Mixed hyperlipidemia     Overweight     Allergies   Allergen Reactions     Penicillin [Penicillins]          Immunizations discussed include:  Worked in a nursing home  Hepatitis A:  Ordered/given today, risks, benefits and side effects reviewed  Hepatitis B: Up to date  Influenza: Up to date  Typhoid: Ordered/given today, risks, benefits and side effects reviewed  Rabies: Declined  reviewed managment of a animal bite or scratch (washing wound, seek medical care within 24 hours for post exposure prophylaxis )  Yellow Fever: Not indicated  Japanese Encephalitis: Not indicated  Meningococcus: Ordered/given today, risks, benefits and side effects reviewed  Tetanus/Diphtheria: will get at [facility]  Measles/Mumps/Rubella: Up to date  Cholera: Not needed  Polio: Up to date  Pneumococcal: Under age of 65  Varicella: Immune by disease history per patient report  Zostavax:  Not indicated  Shingrix: deferreed  HPV:  Not indicated  TB:  Low ris,     Altitude Exposure on this trip: o  Past tolerance to Altitude: na    ASSESSMENT/PLAN:    ICD-10-CM    1. Travel advice encounter Z71.89 HEPA VACCINE ADULT IM     TYPHOID VACCINE, IM     MENINGOCOCCAL VACCINE,IM (MENACTRA)     azithromycin (ZITHROMAX) 500 MG tablet     atovaquone-proguanil (MALARONE) 250-100 MG per tablet     ADMIN 1st VACCINE     EA ADD'L VACCINE     CANCELED: TDAP, IM (10 - 64 YRS) - Adacel   2. Need for vaccination Z23 HEPA VACCINE ADULT IM     TYPHOID VACCINE, IM     MENINGOCOCCAL VACCINE,IM (MENACTRA)     ADMIN 1st VACCINE     EA ADD'L VACCINE     CANCELED: TDAP,  IM (10 - 64 YRS) - Adacel     I have reviewed general recommendations for safe travel   including: food/water precautions, insect precautions, safer sex   practices given high prevalence of Zika, HIV and other STDs,   roadway safety. Educational materials and [employer] report provided.    Malaraia prophylaxis recommended: Malarone  Symptomatic treatment for traveler's diarrhea: azithromycin  Altitude illness prevention and treatment: no      Evacuation insurance advised and resources were provided to patient.    Total visit time 30 minutes  with over 50% of time spent counseling patient as detailed above.    Ana Maria Torrez CNP

## 2018-10-29 NOTE — MR AVS SNAPSHOT
After Visit Summary   10/29/2018    Gerardo Valenzuela    MRN: [id number]           Patient Information     Date Of Birth          1960        Visit Information        Provider Department      10/29/2018 12:30 PM Ana Maria Torrez APRN CNP [facility]        Today's Diagnoses     Travel advice encounter    -  1    Need for vaccination          Care Instructions    Today October 29, 2018 you received the    Hepatitis A Vaccine - Please return on 4/27/19 or later for your 2nd and final dose.    Meningococcal (Menactra) Vaccine    Typhoid - injectable. This vaccine is valid for two years.   .    These appointments can be made as a NURSE ONLY visit.    **It is very important for the vaccinations to be given on the scheduled day(s), this helps ensure you receive the full effectiveness of the vaccine.**    Please call [facility] with any questions [phone]    Thank you for visiting [facility]              Follow-ups after your visit        Who to contact     If you have questions or need follow up information about today's clinic visit or your schedule please contact [facility] directly at [phone].  Normal or non-critical lab and imaging results will be communicated to you by MyChart, letter or phone within 4 business days after the clinic has received the results. If you do not hear from us within 7 days, please contact the clinic through MyChart or phone. If you have a critical or abnormal lab result, we will notify you by phone as soon as possible.  Submit refill requests through [employer] or call your pharmacy and they will forward the refill request to us. Please allow 3 business days for your refill to be completed.          Additional Information About Your Visit        Care EveryWhere ID     This is your Care EveryWhere ID. This could be used by other organizations to access your [facility] medical records  [id number]         Your Vitals Were     Temperature                   98  F (36.7  C) (Oral)            Blood Pressure from Last 3 Encounters:   10/29/18 113/69   08/20/18 106/70   08/07/18 120/72    Weight from Last 3 Encounters:   08/20/18 142 lb 3.2 oz (64.5 kg)   08/07/18 141 lb (64 kg)   06/20/18 139 lb (63 kg)              We Performed the Following     HEPA VACCINE ADULT IM     MENINGOCOCCAL VACCINE,IM (MENACTRA)     TYPHOID VACCINE, IM          Today's Medication Changes          These changes are accurate as of 10/29/18  1:38 PM.  If you have any questions, ask your nurse or doctor.               Start taking these medicines.        Dose/Directions    atovaquone-proguanil 250-100 MG per tablet   Commonly known as:  MALARONE   Used for:  Travel advice encounter   Started by:  Ana Maria Torrez APRN CNP        Dose:  1 tablet   Take 1 tablet by mouth daily Start 2 days before exposure to Malaria and continue daily till  7 days after exposure.   Quantity:  25 tablet   Refills:  0       azithromycin 500 MG tablet   Commonly known as:  ZITHROMAX   Used for:  Travel advice encounter   Started by:  Ana Maria Torrez APRN CNP        Dose:  500 mg   Take 1 tablet (500 mg) by mouth daily for 3 doses Take 1 tablet a day for up to 3 days for severe diarrhea   Quantity:  3 tablet   Refills:  0            Where to get your medicines      These medications were sent to [facility] [place], [place] - [address]  [address], [facility] [address]     Phone:  [phone]     atovaquone-proguanil 250-100 MG per tablet    azithromycin 500 MG tablet                Primary Care Provider Office Phone # Fax #    IVONNE Weiss [phone]       [address]   [facility] [address]        Equal Access to Services     SCOTTIE MCCANN : Wilian Rendon, dannielle trejo, vilma guo. So [facility]  [phone].    ATENCIÓN: Si agnieszka ram, tiene a fuentes disposición servicios gratuitos de asistencia lingüística. Gail ziegler [phone].    We comply with applicable federal civil rights laws and [place] laws. We do not discriminate on the basis of race, color, national origin, age, disability, sex, sexual orientation, or gender identity.            Thank you!     Thank you for choosing [facility]  for your care. Our goal is always to provide you with excellent care. Hearing back from our patients is one way we can continue to improve our services. Please take a few minutes to complete the written survey that you may receive in the mail after your visit with us. Thank you!             Your Updated Medication List - Protect others around you: Learn how to safely use, store and throw away your medicines at www.disposemymeds.org.          This list is accurate as of 10/29/18  1:38 PM.  Always use your most recent med list.                   Brand Name Dispense Instructions for use Diagnosis    atovaquone-proguanil 250-100 MG per tablet    MALARONE    25 tablet    Take 1 tablet by mouth daily Start 2 days before exposure to Malaria and continue daily till  7 days after exposure.    Travel advice encounter       azithromycin 500 MG tablet    ZITHROMAX    3 tablet    Take 1 tablet (500 mg) by mouth daily for 3 doses Take 1 tablet a day for up to 3 days for severe diarrhea    Travel advice encounter       cholecalciferol 61727 units capsule    VITAMIN D3    8 capsule    Take 1 capsule (50,000 Units) by mouth once a week    Vitamin D deficiency       famotidine 20 MG tablet    PEPCID    90 tablet    Take 1 tablet (20 mg) by mouth 2 times daily    Dyspepsia

## 2018-10-29 NOTE — PATIENT INSTRUCTIONS
Today October 29, 2018 you received the    Hepatitis A Vaccine - Please return on 4/27/19 or later for your 2nd and final dose.    Meningococcal (Menactra) Vaccine    Typhoid - injectable. This vaccine is valid for two years.   .    These appointments can be made as a NURSE ONLY visit.    **It is very important for the vaccinations to be given on the scheduled day(s), this helps ensure you receive the full effectiveness of the vaccine.**    Please call [facility] [place] [facility] with any questions [phone]    Thank you for visiting [facility]

## 2019-09-13 ENCOUNTER — APPOINTMENT (OUTPATIENT)
Dept: ULTRASOUND IMAGING | Facility: CLINIC | Age: 59
End: 2019-09-13
Attending: FAMILY MEDICINE
Payer: COMMERCIAL

## 2019-09-13 ENCOUNTER — APPOINTMENT (OUTPATIENT)
Dept: CT IMAGING | Facility: CLINIC | Age: 59
End: 2019-09-13
Attending: FAMILY MEDICINE
Payer: COMMERCIAL

## 2019-09-13 ENCOUNTER — HOSPITAL ENCOUNTER (EMERGENCY)
Facility: CLINIC | Age: 59
Discharge: HOME | End: 2019-09-13
Attending: FAMILY MEDICINE | Admitting: FAMILY MEDICINE
Payer: COMMERCIAL

## 2019-09-13 VITALS
DIASTOLIC BLOOD PRESSURE: 69 MMHG | RESPIRATION RATE: 16 BRPM | BODY MASS INDEX: 26.6 KG/M2 | OXYGEN SATURATION: 98 % | TEMPERATURE: 99.2 F | SYSTOLIC BLOOD PRESSURE: 124 MMHG | WEIGHT: 140 LBS | HEART RATE: 75 BPM

## 2019-09-13 DIAGNOSIS — R19.00 PELVIC MASS: ICD-10-CM

## 2019-09-13 DIAGNOSIS — R19.00 PELVIC MASS IN FEMALE: Primary | ICD-10-CM

## 2019-09-13 LAB
ALBUMIN SERPL-MCNC: 4 G/DL (ref 3.4–5)
ALBUMIN UR-MCNC: 10 MG/DL
ALP SERPL-CCNC: 92 U/L (ref 40–150)
ALT SERPL W P-5'-P-CCNC: 22 U/L (ref 0–50)
ANION GAP SERPL CALCULATED.3IONS-SCNC: 9 MMOL/L (ref 3–14)
APPEARANCE UR: CLEAR
AST SERPL W P-5'-P-CCNC: 22 U/L (ref 0–45)
BASOPHILS # BLD AUTO: 0 10E9/L (ref 0–0.2)
BASOPHILS NFR BLD AUTO: 0.1 %
BILIRUB SERPL-MCNC: 0.5 MG/DL (ref 0.2–1.3)
BILIRUB UR QL STRIP: NEGATIVE
BUN SERPL-MCNC: 14 MG/DL (ref 7–30)
CALCIUM SERPL-MCNC: 9.1 MG/DL (ref 8.5–10.1)
CHLORIDE SERPL-SCNC: 101 MMOL/L (ref 94–109)
CO2 SERPL-SCNC: 26 MMOL/L (ref 20–32)
COLOR UR AUTO: YELLOW
CREAT SERPL-MCNC: 0.49 MG/DL (ref 0.52–1.04)
DIFFERENTIAL METHOD BLD: NORMAL
EOSINOPHIL # BLD AUTO: 0 10E9/L (ref 0–0.7)
EOSINOPHIL NFR BLD AUTO: 0.1 %
ERYTHROCYTE [DISTWIDTH] IN BLOOD BY AUTOMATED COUNT: 12.7 % (ref 10–15)
GFR SERPL CREATININE-BSD FRML MDRD: >90 ML/MIN/{1.73_M2}
GLUCOSE SERPL-MCNC: 154 MG/DL (ref 70–99)
GLUCOSE UR STRIP-MCNC: NEGATIVE MG/DL
HCT VFR BLD AUTO: 40.4 % (ref 35–47)
HGB BLD-MCNC: 14.2 G/DL (ref 11.7–15.7)
HGB UR QL STRIP: NEGATIVE
IMM GRANULOCYTES # BLD: 0 10E9/L (ref 0–0.4)
IMM GRANULOCYTES NFR BLD: 0.3 %
KETONES UR STRIP-MCNC: NEGATIVE MG/DL
LEUKOCYTE ESTERASE UR QL STRIP: NEGATIVE
LYMPHOCYTES # BLD AUTO: 0.9 10E9/L (ref 0.8–5.3)
LYMPHOCYTES NFR BLD AUTO: 13.6 %
MCH RBC QN AUTO: 31.3 PG (ref 26.5–33)
MCHC RBC AUTO-ENTMCNC: 35.1 G/DL (ref 31.5–36.5)
MCV RBC AUTO: 89 FL (ref 78–100)
MONOCYTES # BLD AUTO: 0.6 10E9/L (ref 0–1.3)
MONOCYTES NFR BLD AUTO: 8.5 %
NEUTROPHILS # BLD AUTO: 5.2 10E9/L (ref 1.6–8.3)
NEUTROPHILS NFR BLD AUTO: 77.4 %
NITRATE UR QL: NEGATIVE
NRBC # BLD AUTO: 0 10*3/UL
NRBC BLD AUTO-RTO: 0 /100
PH UR STRIP: 7 PH (ref 5–7)
PLATELET # BLD AUTO: 276 10E9/L (ref 150–450)
POTASSIUM SERPL-SCNC: 3.6 MMOL/L (ref 3.4–5.3)
PROT SERPL-MCNC: 8.2 G/DL (ref 6.8–8.8)
RBC # BLD AUTO: 4.54 10E12/L (ref 3.8–5.2)
RBC #/AREA URNS AUTO: 1 /HPF (ref 0–2)
SODIUM SERPL-SCNC: 136 MMOL/L (ref 133–144)
SOURCE: ABNORMAL
SP GR UR STRIP: 1.02 (ref 1–1.03)
SQUAMOUS #/AREA URNS AUTO: <1 /HPF (ref 0–1)
UROBILINOGEN UR STRIP-MCNC: NORMAL MG/DL (ref 0–2)
WBC # BLD AUTO: 6.7 10E9/L (ref 4–11)
WBC #/AREA URNS AUTO: <1 /HPF (ref 0–5)

## 2019-09-13 PROCEDURE — 25000128 H RX IP 250 OP 636: Performed by: FAMILY MEDICINE

## 2019-09-13 PROCEDURE — 96375 TX/PRO/DX INJ NEW DRUG ADDON: CPT

## 2019-09-13 PROCEDURE — 80053 COMPREHEN METABOLIC PANEL: CPT | Performed by: FAMILY MEDICINE

## 2019-09-13 PROCEDURE — 81001 URINALYSIS AUTO W/SCOPE: CPT | Performed by: FAMILY MEDICINE

## 2019-09-13 PROCEDURE — 99203 OFFICE O/P NEW LOW 30 MIN: CPT | Mod: GC | Performed by: OBSTETRICS & GYNECOLOGY

## 2019-09-13 PROCEDURE — 74176 CT ABD & PELVIS W/O CONTRAST: CPT

## 2019-09-13 PROCEDURE — 86304 IMMUNOASSAY TUMOR CA 125: CPT | Performed by: STUDENT IN AN ORGANIZED HEALTH CARE EDUCATION/TRAINING PROGRAM

## 2019-09-13 PROCEDURE — 99285 EMERGENCY DEPT VISIT HI MDM: CPT | Mod: 25

## 2019-09-13 PROCEDURE — 99285 EMERGENCY DEPT VISIT HI MDM: CPT | Mod: Z6 | Performed by: FAMILY MEDICINE

## 2019-09-13 PROCEDURE — 86301 IMMUNOASSAY TUMOR CA 19-9: CPT | Performed by: STUDENT IN AN ORGANIZED HEALTH CARE EDUCATION/TRAINING PROGRAM

## 2019-09-13 PROCEDURE — 85025 COMPLETE CBC W/AUTO DIFF WBC: CPT | Performed by: FAMILY MEDICINE

## 2019-09-13 PROCEDURE — 82378 CARCINOEMBRYONIC ANTIGEN: CPT | Performed by: STUDENT IN AN ORGANIZED HEALTH CARE EDUCATION/TRAINING PROGRAM

## 2019-09-13 PROCEDURE — 87086 URINE CULTURE/COLONY COUNT: CPT | Performed by: FAMILY MEDICINE

## 2019-09-13 PROCEDURE — 96374 THER/PROPH/DIAG INJ IV PUSH: CPT

## 2019-09-13 PROCEDURE — 96376 TX/PRO/DX INJ SAME DRUG ADON: CPT

## 2019-09-13 PROCEDURE — 76830 TRANSVAGINAL US NON-OB: CPT

## 2019-09-13 RX ORDER — OXYCODONE AND ACETAMINOPHEN 5; 325 MG/1; MG/1
1-2 TABLET ORAL EVERY 4 HOURS PRN
Qty: 12 TABLET | Refills: 0 | Status: ON HOLD | OUTPATIENT
Start: 2019-09-13 | End: 2019-10-10

## 2019-09-13 RX ORDER — ONDANSETRON 4 MG/1
4 TABLET, FILM COATED ORAL EVERY 8 HOURS PRN
Qty: 6 TABLET | Refills: 0 | Status: SHIPPED | OUTPATIENT
Start: 2019-09-13 | End: 2019-10-29

## 2019-09-13 RX ORDER — HYDROMORPHONE HCL/0.9% NACL/PF 0.2MG/0.2
0.2 SYRINGE (ML) INTRAVENOUS ONCE
Status: COMPLETED | OUTPATIENT
Start: 2019-09-13 | End: 2019-09-13

## 2019-09-13 RX ORDER — KETOROLAC TROMETHAMINE 15 MG/ML
15 INJECTION, SOLUTION INTRAMUSCULAR; INTRAVENOUS ONCE
Status: COMPLETED | OUTPATIENT
Start: 2019-09-13 | End: 2019-09-13

## 2019-09-13 RX ORDER — IBUPROFEN 800 MG/1
800 TABLET, FILM COATED ORAL EVERY 8 HOURS PRN
Qty: 30 TABLET | Refills: 0 | Status: ON HOLD | OUTPATIENT
Start: 2019-09-13 | End: 2019-10-10

## 2019-09-13 RX ORDER — ONDANSETRON 2 MG/ML
4 INJECTION INTRAMUSCULAR; INTRAVENOUS ONCE
Status: COMPLETED | OUTPATIENT
Start: 2019-09-13 | End: 2019-09-13

## 2019-09-13 RX ADMIN — Medication 0.2 MG: at 19:16

## 2019-09-13 RX ADMIN — Medication 0.2 MG: at 17:08

## 2019-09-13 RX ADMIN — ONDANSETRON 4 MG: 2 INJECTION INTRAMUSCULAR; INTRAVENOUS at 16:12

## 2019-09-13 RX ADMIN — KETOROLAC TROMETHAMINE 15 MG: 15 INJECTION, SOLUTION INTRAMUSCULAR; INTRAVENOUS at 16:12

## 2019-09-13 ASSESSMENT — ENCOUNTER SYMPTOMS
ABDOMINAL PAIN: 1
FEVER: 0
CONSTIPATION: 1
CHILLS: 1
VOMITING: 1
NAUSEA: 1
FLANK PAIN: 1
DIARRHEA: 0

## 2019-09-13 NOTE — ED PROVIDER NOTES
[facility] EMERGENCY DEPARTMENT ([facility])    9/13/19     ED 18    History     Chief Complaint   Patient presents with     Abdominal Pain     reports was at work and had a sudden onset of right lower quadrant pain that radiates to her right flank area, nauseated and has vomited three times, denies any diarrhea, denies fevers     The history is provided by the patient and medical records.     Gerardo Valenzuela is a 59 year old female who presents with sudden onset of right lower quadrant abdominal pain, flank pain that started today. She has a history of hyperlipidemia, prediabetes, and gastritis. Patient was at work today when she developed sudden onset of right lower quadrant abdominal pain/flank pain with pain shooting down her leg as well. She has had nausea and vomiting with this, no diarrhea.  She states she vomited a total of 3 times, last vomited at 2:30pm. No fevers, but has had some chills.   In past 3-4 months she had similar symptoms but usually manageable with Tylenol. She tried Tylenol and ibuprofen today for her symptoms but they are not helping. She has some urinary hesitancy as well. She has the urge to stool but not able to.   No fevers noted. She is post menopausal and no vaginal bleeding    I have reviewed the Medications, Allergies, Past Medical and Surgical History, and Social History in the [employer] system.  History reviewed. No pertinent past medical history.    History reviewed. No pertinent surgical history.    Family History   Problem Relation Age of Onset     Cerebrovascular Disease Mother        Social History     Tobacco Use     Smoking status: Never Smoker     Smokeless tobacco: Never Used   Substance Use Topics     Alcohol use: No    no street drugs  Review of Systems   Constitutional: Positive for chills. Negative for fatigue and fever.   HENT: Negative for congestion.    Respiratory: Negative for cough and shortness of breath.    Cardiovascular: Negative.    Gastrointestinal:  Positive for abdominal pain, constipation, nausea and vomiting. Negative for diarrhea.   Genitourinary: Positive for flank pain. Negative for menstrual problem, vaginal bleeding and vaginal discharge.        Urinary hesitancy   Musculoskeletal: Negative for myalgias.   Skin: Negative.    Allergic/Immunologic: Negative for immunocompromised state.   Neurological: Negative.    Hematological: Negative.    All other systems reviewed and are negative.      Physical Exam   BP: (!) 145/70  Pulse: 61  Temp: 95.7  F (35.4  C)  Resp: 16  Weight: 63.5 kg (140 lb)  SpO2: 100 %      Physical Exam   Constitutional: She is oriented to person, place, and time.  Non-toxic appearance. She does not appear ill.   Uncomfortable due to pain   HENT:   Head: Normocephalic and atraumatic.   Cardiovascular: Normal rate, regular rhythm, normal heart sounds and intact distal pulses.   No murmur heard.  No pulsitile abd masses and no briuts   Pulmonary/Chest: Effort normal and breath sounds normal.   Abdominal: Normal aorta. There is no splenomegaly. There is tenderness in the right lower quadrant and suprapubic area. There is no rigidity, no rebound, no guarding and negative Zhu's sign.   Genitourinary: Vagina normal. No bleeding in the vagina.   Genitourinary Comments: Atrophy  There is a fullness midline with mild tenderness  This portion of the exam done after obtaining ct   Neurological: She is alert and oriented to person, place, and time.   Skin: Skin is warm and dry.   Nursing note and vitals reviewed.      ED Course        Procedures        Sudden onset of right abd pain with right flank pain- working dx was renal colic. Pt got zofran and tordal with no help with pain but help with nausea. Dilaudid 0.2 mg given x 3 in ed for pain control over several hours  ua was normal  CBC and chemistries normal  CT done- shows large pelvic mass 10 cm indiameter  US done at suggestion of radiology. Concern for malignancy  Spoke with radiologist-  no or minimal blood flow to mass    Immediate GYN CONSULT- see their note  Feels ok to go home. Appointment with gyn onc clinic this week  Will discharge with motrin, percocet and zofran   Return asap if worse in any aspect  Pt and family comfortable with plan    Labs Ordered and Resulted from Time of ED Arrival Up to the Time of Departure from the ED   COMPREHENSIVE METABOLIC PANEL - Abnormal; Notable for the following components:       Result Value    Glucose 154 (*)     Creatinine 0.49 (*)     All other components within normal limits   ROUTINE UA WITH MICROSCOPIC - Abnormal; Notable for the following components:    Protein Albumin Urine 10 (*)     All other components within normal limits   CBC WITH PLATELETS DIFFERENTIAL   CANCER ANTIGEN 19-9   PERIPHERAL IV CATHETER            Assessments & Plan (with Medical Decision Making)   *    I have reviewed the nursing notes.    I have reviewed the findings, diagnosis, plan and need for follow up with the patient.    Discharge Medication List as of 9/13/2019 11:48 PM      START taking these medications    Details   ibuprofen (ADVIL/MOTRIN) 800 MG tablet Take 1 tablet (800 mg) by mouth every 8 hours as needed for moderate pain, Disp-30 tablet, R-0, Local Print      ondansetron (ZOFRAN) 4 MG tablet Take 1 tablet (4 mg) by mouth every 8 hours as needed for nausea, Disp-6 tablet, R-0, Local Print      oxyCODONE-acetaminophen (PERCOCET) 5-325 MG tablet Take 1-2 tablets by mouth every 4 hours as needed for pain, Disp-12 tablet, R-0, Local Print             Final diagnoses:   Pelvic mass     I, Xenia Strong, am serving as a trained medical scribe to document services personally performed by Clive Campbell MD based on the provider's statements to me on September 13, 2019.  This document has been checked and approved by the attending provider.    I, Clive Campbell MD, was physically present and have reviewed and verified the accuracy of this note documented by Xenia Strong,  medical scribe.       9/13/2019   [facility] [place], EMERGENCY DEPARTMENT     Clive Campbell MD  09/14/19 2613

## 2019-09-13 NOTE — ED AVS SNAPSHOT
[facility], Emergency Department  [address]  [facility] [address]  Phone:  [phone]  Fax:  [phone]                                    Gerardo Valenzuela   MRN: [id number]    Department:  [facility], Emergency Department   Date of Visit:  9/13/2019           After Visit Summary Signature Page    I have received my discharge instructions, and my questions have been answered. I have discussed any challenges I see with this plan with the nurse or doctor.    ..........................................................................................................................................  Patient/Patient Representative Signature      ..........................................................................................................................................  Patient Representative Print Name and Relationship to Patient    ..................................................               ................................................  Date                                   Time    ..........................................................................................................................................  Reviewed by Signature/Title    ...................................................              ..............................................  Date                                               Time          22EPIC Rev 08/18

## 2019-09-14 LAB
BACTERIA SPEC CULT: NORMAL
CANCER AG125 SERPL-ACNC: 6 U/ML (ref 0–30)
CEA SERPL-MCNC: <0.5 UG/L (ref 0–2.5)
Lab: NORMAL
SPECIMEN SOURCE: NORMAL

## 2019-09-14 ASSESSMENT — ENCOUNTER SYMPTOMS
HEMATOLOGIC/LYMPHATIC NEGATIVE: 1
SHORTNESS OF BREATH: 0
MYALGIAS: 0
NEUROLOGICAL NEGATIVE: 1
COUGH: 0
CARDIOVASCULAR NEGATIVE: 1
FATIGUE: 0

## 2019-09-14 NOTE — DISCHARGE INSTRUCTIONS
Gynecologic Oncology Clinic  [phone]- you will be called but if no calls by noon on Monday- you call and tell them you need to be seen next week- if any questions have the clinic nurse look at this note.  Return immediately for worse pain or fevers or chills or vomiting.  For the pain, motrin 800 mg every 6 hours, if needed for extra pain control percocet. Percocet can constipate- if needed milk of magnesia.  If nausea, zofran

## 2019-09-14 NOTE — CONSULTS
Gynecology Consult Note    Gerardo Valenzuela  MRN: [id number]  : 1960    Consulting Provider: Dr. Campbell  Consulting Service: Emergency Department    Chief Complaint: Abdominal pain    History of Present Illness:   Gerardo Valenzuela is a 59 year old  female presenting with abdominal pain. She reports she has had some pain on and off in her LLQ for 2 years. She was told it was GERD and given antacids. Today, her pain was much different. It came on suddenly at work. The pain was mostly in her LLQ but also radiated to her back. She had associated nausea/vomiting. She mostly just vomited up bile, not large amounts of food. She hasn't vomited since about 1100 today. She usually takes tylenol/ibuprofen for pain, but neither helped today. She reports since arriving to the ED her pain is improved but still there.    She denies recent headaches, weight loss, bloating, fevers, chills, night sweats, chest pain, SOB, constipation, diarrhea. She is postmenopausal, denies any bleeding.     OB/Gyn Hx:   - Postmenopausal x8-10 years  -  x5    ROS: Otherwise negative unless stated in HPI    Past Medical History:  Reflux    Past Surgical History:   None    Social History:  Denies EtOH, tobacco, drug use    Family History:   Cerebrovascular disease - mother    Allergies:  Penicillin     Medications:  None    Physical Exam:  Patient Vitals for the past 24 hrs:   BP Temp Temp src Pulse Resp SpO2 Weight   19 -- -- -- -- -- 98 % --   19 111/72 -- -- 76 -- -- --   19 1945 133/83 -- -- 104 16 99 % --   19 1830 (!) 147/83 -- -- 65 -- 100 % --   19 1815 (!) 140/80 -- -- 71 -- 100 % --   19 1800 136/74 -- -- 72 -- 98 % --   19 1745 (!) 141/77 -- -- 64 -- 98 % --   19 1730 132/67 -- -- 73 -- 93 % --   19 1715 (!) 142/90 -- -- 71 -- 97 % --   19 1712 (!) 142/92 -- -- 77 -- -- --   19 1518 (!) 145/70 95.7  F (35.4  C) Oral 61 16 100 % 63.5 kg (140 lb)      General: NAD, appears comfortable  Resp: no respiratory distress  CV: regular rate  Abdomen: soft, non-distended, minimally tender in LLQ, firm mass palpable just under the umbilicus, no ascites  : deferred  Ext: Warm, well perfused, no edema  Neuro: appears intact grossly moving all 4 extremities equally.    Labs/Imaging:  Results for orders placed or performed during the hospital encounter of 09/13/19   Abd/pelvis CT no contrast - Stone Protocol    Narrative    CT ABDOMEN AND PELVIS WITHOUT CONTRAST  9/13/2019 6:46 PM     HISTORY: Right-sided acute abdominal pain with right flank pain.    COMPARISON: None.    TECHNIQUE: Axial CT images of the abdomen and pelvis from the  diaphragm to the symphysis pubis were acquired without IV contrast.  Radiation dose for this scan was reduced using automated exposure  control, adjustment of the mA and/or kV according to patient size, or  iterative reconstruction technique.    FINDINGS: There are no stones seen within either kidney, either  ureter, or the bladder. There is no hydroureter or hydronephrosis.  There is no perinephric fat stranding. Kidneys are normal in size and  configuration. Indeterminate pelvic mass measuring approximately 10.8  x 11.5 x 11.0 cm. It is unclear if this is ovarian or adnexal in  origin. Small amount of free fluid. No bowel obstruction. Normal  caliber aorta. Unremarkable gallbladder. Liver cyst noted. No  splenomegaly. No definite adrenal nodules. Pancreas grossly  unremarkable. The remainder of the visualized abdomen is unremarkable  on this noncontrast scan. Survey of the visualized bony structures  demonstrates no destructive bony lesions. The visualized lung bases  are unremarkable.      Impression    IMPRESSION:   1. Large indeterminate lesion in the pelvis, consider pelvic  ultrasound for further evaluation.  2. No renal, ureteral, or bladder stones.    DEANNA MAX MD   US Pel W/Trans*    Narrative    ULTRASOUND PELVIS WITH  TRANSVAGINAL IMAGING  9/13/2019 8:59 PM     HISTORY: Large mass on CT. Pelvic exam, suspect fibroid.    COMPARISON: None.    FINDINGS:  Endovaginal sonography was added to the transabdominal  scans.    Midline mass measuring 11.1 x 8.7 x 11.0 cm appears separate from the  uterus. The uterus is 6.9 x 3.4 x 4.6 cm. Endometrial stripe measures  10 mm and is normal for patient's age and menstrual status. Normal  ovaries are not seen, they may be obscured by bowel gas, differential  includes an ovarian mass. Small amount of free pelvic fluid is  present.      Impression    IMPRESSION: Mass in the pelvis appears separate from the uterus by  ultrasound, then usually pedunculated fibroid and ovarian mass are  considerations in the differential including malignancy.    EDANNA MAX MD   CBC with platelets differential   Result Value Ref Range    WBC 6.7 4.0 - 11.0 10e9/L    RBC Count 4.54 3.8 - 5.2 10e12/L    Hemoglobin 14.2 11.7 - 15.7 g/dL    Hematocrit 40.4 35.0 - 47.0 %    MCV 89 78 - 100 fl    MCH 31.3 26.5 - 33.0 pg    MCHC 35.1 31.5 - 36.5 g/dL    RDW 12.7 10.0 - 15.0 %    Platelet Count 276 150 - 450 10e9/L    Diff Method Automated Method     % Neutrophils 77.4 %    % Lymphocytes 13.6 %    % Monocytes 8.5 %    % Eosinophils 0.1 %    % Basophils 0.1 %    % Immature Granulocytes 0.3 %    Nucleated RBCs 0 0 /100    Absolute Neutrophil 5.2 1.6 - 8.3 10e9/L    Absolute Lymphocytes 0.9 0.8 - 5.3 10e9/L    Absolute Monocytes 0.6 0.0 - 1.3 10e9/L    Absolute Eosinophils 0.0 0.0 - 0.7 10e9/L    Absolute Basophils 0.0 0.0 - 0.2 10e9/L    Abs Immature Granulocytes 0.0 0 - 0.4 10e9/L    Absolute Nucleated RBC 0.0    Comprehensive metabolic panel   Result Value Ref Range    Sodium 136 133 - 144 mmol/L    Potassium 3.6 3.4 - 5.3 mmol/L    Chloride 101 94 - 109 mmol/L    Carbon Dioxide 26 20 - 32 mmol/L    Anion Gap 9 3 - 14 mmol/L    Glucose 154 (H) 70 - 99 mg/dL    Urea Nitrogen 14 7 - 30 mg/dL    Creatinine 0.49 (L) 0.52 - 1.04  mg/dL    GFR Estimate >90 >60 mL/min/[1.73_m2]    GFR Estimate If Black >90 >60 mL/min/[1.73_m2]    Calcium 9.1 8.5 - 10.1 mg/dL    Bilirubin Total 0.5 0.2 - 1.3 mg/dL    Albumin 4.0 3.4 - 5.0 g/dL    Protein Total 8.2 6.8 - 8.8 g/dL    Alkaline Phosphatase 92 40 - 150 U/L    ALT 22 0 - 50 U/L    AST 22 0 - 45 U/L   UA with Microscopic   Result Value Ref Range    Color Urine Yellow     Appearance Urine Clear     Glucose Urine Negative NEG^Negative mg/dL    Bilirubin Urine Negative NEG^Negative    Ketones Urine Negative NEG^Negative mg/dL    Specific Gravity Urine 1.017 1.003 - 1.035    Blood Urine Negative NEG^Negative    pH Urine 7.0 5.0 - 7.0 pH    Protein Albumin Urine 10 (A) NEG^Negative mg/dL    Urobilinogen mg/dL Normal 0.0 - 2.0 mg/dL    Nitrite Urine Negative NEG^Negative    Leukocyte Esterase Urine Negative NEG^Negative    Source Midstream Urine     WBC Urine <1 0 - 5 /HPF    RBC Urine 1 0 - 2 /HPF    Squamous Epithelial /HPF Urine <1 0 - 1 /HPF   Urine Culture   Result Value Ref Range    Specimen Description Unspecified Urine     Special Requests Specimen received in preservative     Culture Micro PENDING      Impression:  Gerardo Valenzuela is a 59 year old  female presenting with long standing abdominal pain with acute worsening today.  Initially symptoms concerning for torsion, however pain was much improved on our exam. Discussed that a mass like this in a postmenopausal woman has a higher chance of malignancy, though differential is wide. There are some things that argue against malignancy (no ascites, B symptoms, chronicity of pain) though she would likely be best served having surgery with the Oncology team so they could be prepared to do a cancer surgery if needed. Given her abdominal exam is reassuring today, there is not an indication for emergent surgery overnight at this point and outpatient follow-up is appropriate. The patient is in agreement with this plan.    Plan:   - Discharge to home  with Rx for narcotic until appointment  - Obtain tumor markers prior to discharge (CEA, , CA 19-9)  - Follow-up with Gynecology Oncology  - At minimum, she should have an endometrial biopsy given an EMS of 10mm      Thank you for allowing us to be involved in the care of your patient. Please do not hesitate to give us a call with further questions.     Team OB/GYN Consult pagers: [phone] from 6:30AM to 6:30PM, [phone] from 6PM to 6:30AM.    Patient seen and care plan discussed under supervision of Dr. Perera.      Tanisha Sahni MD  OBGYN Resident PGY3  2019 9:37 PM      Physician Attestation   I, Krystyna Perera MD, saw this patient with the resident and agree with the resident/fellow's findings and plan of care as documented in the note.      I personally reviewed vital signs, medications, labs, imaging and exam.    Key findings: 59 year old  postmenopausal female with abdominal pain found to have a pelvic mass of unknown etiology. Agree that patient should be see by gyn onc as outpatient.     Krystyna Perera MD  Date of Service (when I saw the patient): 19

## 2019-09-15 LAB — CANCER AG19-9 SERPL-ACNC: 16 U/ML (ref 0–37)

## 2019-09-16 NOTE — TELEPHONE ENCOUNTER
ONCOLOGY INTAKE: Records Information      APPT INFORMATION: 9/18/19 - [facility]  Referring provider:  Krystyna Perera  Referring provider s clinic:  [facility]  Reason for visit/diagnosis:  Pelvic mass in female [R19.00]  Has patient been notified of appointment date and time?: Yes - confirmed with pt's daughter    RECORDS INFORMATION:  Were the records received with the referral (via [employer])? Internal referral    Has patient been seen for any external appt for this diagnosis? No    If yes, where? NA    Has patient had any imaging or procedures outside of [facility] for this condition? No      If Yes, where? NA    ADDITIONAL INFORMATION:  Scheduled via call from pt's daughter

## 2019-09-18 ENCOUNTER — PRE VISIT (OUTPATIENT)
Dept: ONCOLOGY | Facility: CLINIC | Age: 59
End: 2019-09-18

## 2019-09-18 ENCOUNTER — ONCOLOGY VISIT (OUTPATIENT)
Dept: ONCOLOGY | Facility: CLINIC | Age: 59
End: 2019-09-18
Attending: OBSTETRICS & GYNECOLOGY
Payer: COMMERCIAL

## 2019-09-18 VITALS
DIASTOLIC BLOOD PRESSURE: 74 MMHG | HEART RATE: 109 BPM | TEMPERATURE: 98.4 F | BODY MASS INDEX: 27.09 KG/M2 | OXYGEN SATURATION: 100 % | HEIGHT: 60 IN | SYSTOLIC BLOOD PRESSURE: 128 MMHG | WEIGHT: 138 LBS

## 2019-09-18 DIAGNOSIS — R19.00 PELVIC MASS IN FEMALE: ICD-10-CM

## 2019-09-18 PROCEDURE — G0463 HOSPITAL OUTPT CLINIC VISIT: HCPCS | Mod: 25

## 2019-09-18 PROCEDURE — G0145 SCR C/V CYTO,THINLAYER,RESCR: HCPCS | Performed by: OBSTETRICS & GYNECOLOGY

## 2019-09-18 PROCEDURE — 58100 BIOPSY OF UTERUS LINING: CPT | Mod: ZF | Performed by: OBSTETRICS & GYNECOLOGY

## 2019-09-18 PROCEDURE — 99204 OFFICE O/P NEW MOD 45 MIN: CPT | Mod: 25 | Performed by: OBSTETRICS & GYNECOLOGY

## 2019-09-18 PROCEDURE — 88305 TISSUE EXAM BY PATHOLOGIST: CPT | Performed by: OBSTETRICS & GYNECOLOGY

## 2019-09-18 PROCEDURE — 56605 BIOPSY OF VULVA/PERINEUM: CPT | Mod: ZF | Performed by: OBSTETRICS & GYNECOLOGY

## 2019-09-18 PROCEDURE — 87624 HPV HI-RISK TYP POOLED RSLT: CPT | Performed by: OBSTETRICS & GYNECOLOGY

## 2019-09-18 ASSESSMENT — MIFFLIN-ST. JEOR: SCORE: 1114.97

## 2019-09-18 ASSESSMENT — PAIN SCALES - GENERAL: PAINLEVEL: MILD PAIN (3)

## 2019-09-18 NOTE — LETTER
2019       RE: Gerardo Valenzuela  [address] [facility] [id number]     Dear Colleague,    Thank you for referring your patient, Gerardo Valenzuela, to the [facility]. Please see a copy of my visit note below.                            Consult Notes on Referred Patient    Date: 2019       Dr. Krystyna Perera MD  [address]  [place] [address]       RE: Gerardo Valenzuela  : 1960  JOANA: 2019    Dear Dr. Krystyna Perera:    I had the pleasure of seeing your patient Gerardo Valenzuela here at the Gynecologic Cancer Clinic at the [facility] on 2019.  As you know she is a very pleasant 59 year old woman with a recent diagnosis of pelvic mass.  Given these findings she was subsequently sent to the Gynecologic Cancer Clinic for new patient consultation.   G5, 5 prior vaginal deliveries.  Went through menopause at age 51.  Has never been on hormone replacement therapy.  Has had on and off right-sided pelvic pain last year.  Last Friday went to the emergency room and was found to have a complex 11 cm pelvic mass.  No other evidence of any other disease based on the CT scan.  She is eating and drinking well.  No nausea.  Last Friday with the pain had episode of vomiting but not since then.  Normal urinary and bowel function.   No vaginal bleeding or discharge.  No B symptoms.           Past Medical History:  1.  Anemia.   2.  Prediabetic.           Past Surgical History:  None.    Health Maintenance:  Health Maintenance Due   Topic Date Due     ADVANCE CARE PLANNING  1960     COLONOSCOPY  1969     HIV SCREENING  1974     DTAP/TDAP/TD IMMUNIZATION (1 - Tdap) 1984     ZOSTER IMMUNIZATION (1 of 2) 2009     PREVENTIVE CARE VISIT  2018     PHQ-2  2019     INFLUENZA VACCINE (1) 2019       No history of abnormal Pap smears.  Never had a colonoscopy.             Current Medications:     has  "a current medication list which includes the following prescription(s): ibuprofen, oxycodone-acetaminophen, atovaquone-proguanil, vitamin d3, famotidine, and ondansetron.       Allergies:     [unfilled]        Social History:     Social History     Tobacco Use     Smoking status: Never Smoker     Smokeless tobacco: Never Used   Substance Use Topics     Alcohol use: No       History   Drug Use No           Family History:     No family history of cancer.           Family History   Problem Relation Age of Onset     Cerebrovascular Disease Mother          Physical Exam:     /74   Pulse 109   Temp 98.4  F (36.9  C) (Oral)   Ht 1.52 m (4' 11.84\")   Wt 62.6 kg (138 lb)   SpO2 100%   BMI 27.09 kg/m     Body mass index is 27.09 kg/m .    General Appearance: healthy and alert, no distress     Musculoskeletal: extremities non tender and without edema    Neurological: normal gait, no gross defects     Psychiatric: appropriate mood and affect                               ABDOMEN:  Soft.  Mild right lower quadrant tenderness.  There is a mass posterior to umbilicus that is palpable.  No organomegaly.     PELVIC:  External genitalia, patient has a 1 cm raised, soft, white yellowish exophytic lesion centrally on her right labia minora.  Speculum reveals some thickening and hyeprvascularity on the cervix at the 12 o'clock, as well as the 4 and 7-o'clock position.  A Pap smear was taken.  Bimanual exam reveals a mobile right pelvic mass.  Smooth.  Rectovaginal confirms.      PROCEDURE:  The patient was consented for an endometrial biopsy.  This was done without difficulties.  Cervix was also biopsied at 12, 4 and 7-o'clock position.  Good hemostasis was obtained with silver nitrate.  The 1 cm lesion centrally on the right labia majora was also biopsied after injecting 3 mL of 1% lidocaine and a 3 mm punch biopsy was done.  Patient tolerated that as well.  Silver nitrate was used and good hemostasis was obtained.  The " patient was in stable condition at the end of the procedure.           Assessment:    Gerardo Valenzuela is a 59 year old woman with a new diagnosis of pelvic mass.     A total of 60 minutes was spent with the patient, 40 minutes of which were spent in counseling the patient and/or treatment planning. Does not include time for procedure.      1.  Complex pelvic mass.   2.   of 6  3.  Cervical lesion.   4.  Vulvar lesion.   5.  Thickened endometrial stripe.      I discussed with the patient as well as the family, we will await the path results, biopsy results.  We will also obtain a CT of her chest.  We will get her a letter for work and then see her back after those results for planning of surgery.  The patient agrees with the plan.  She is very appreciative of her care.  All questions were answered.             Thank you for allowing us to participate in the care of your patient.         Sincerely,        Patient Care Team:  Bibiana Najera APRN CNP as PCP - General (Nurse Practitioner - Family)  Bibiana Najera APRN CNP as Assigned PCP  Krystyna Velazquez MD as Referring Physician (OB/Gyn)  Prashant Sawyer MD as MD (Obstetrics & Gynecology, Maternal & Fetal Medicine)  KRYSTYNA VELAZQUEZ      Again, thank you for allowing me to participate in the care of your patient.      Sincerely,    Prashant Sawyer MD

## 2019-09-18 NOTE — NURSING NOTE
"Oncology Rooming Note    September 18, 2019 12:50 PM   Gerardo Valenzuela is a 59 year old female who presents for:    Chief Complaint   Patient presents with     Oncology Clinic Visit     New; Pelvic Mass     Initial Vitals: /74   Pulse 109   Temp 98.4  F (36.9  C) (Oral)   Ht 1.52 m (4' 11.84\")   Wt 62.6 kg (138 lb)   SpO2 100%   BMI 27.09 kg/m   Estimated body mass index is 27.09 kg/m  as calculated from the following:    Height as of this encounter: 1.52 m (4' 11.84\").    Weight as of this encounter: 62.6 kg (138 lb). Body surface area is 1.63 meters squared.  Mild Pain (3) Comment: low abd   No LMP recorded. Patient is postmenopausal.  Allergies reviewed: Yes  Medications reviewed: Yes    Medications: Medication refills not needed today.  Pharmacy name entered into [employer]: [facility] [place], [place] - [address]    Clinical concerns: Pt here to discuss pelvic  Dr Sawyer was notified.      Zuri Castano CMA              "

## 2019-09-18 NOTE — NURSING NOTE
Procedure discussed. Consent signed. Time out completed. Both forms placed into scanning.Prior to the start of the procedure and with procedural staff participation, I verbally confirmed the patient s identity using two indicators, relevant allergies, that the procedure was appropriate and matched the consent or emergent situation, and that the correct equipment/implants were available. Immediately prior to starting the procedure I conducted the Time Out with the procedural staff and re-confirmed the patient s name, procedure, and site/side. (The Joint Commission universal protocol was followed.)  Yes    Sedation (Moderate or Deep): None    Post discharge pain: 0    Phuogn Dowd RN

## 2019-09-18 NOTE — PROGRESS NOTES
Consult Notes on Referred Patient    Date: 2019       Dr. Krystyna Perera MD  [address] [address]  [place] [address]       RE: Gerardo Valenzuela  : 1960  JOANA: 2019    Dear Dr. Krystyna Perera:    I had the pleasure of seeing your patient Gerardo Valenzuela here at the Gynecologic Cancer Clinic at the [facility] on 2019.  As you know she is a very pleasant 59 year old woman with a recent diagnosis of pelvic mass.  Given these findings she was subsequently sent to the Gynecologic Cancer Clinic for new patient consultation.   G5, 5 prior vaginal deliveries.  Went through menopause at age 51.  Has never been on hormone replacement therapy.  Has had on and off right-sided pelvic pain last year.  Last Friday went to the emergency room and was found to have a complex 11 cm pelvic mass.  No other evidence of any other disease based on the CT scan.  She is eating and drinking well.  No nausea.  Last Friday with the pain had episode of vomiting but not since then.  Normal urinary and bowel function.   No vaginal bleeding or discharge.  No B symptoms.           Past Medical History:  1.  Anemia.   2.  Prediabetic.           Past Surgical History:  None.    Health Maintenance:  Health Maintenance Due   Topic Date Due     ADVANCE CARE PLANNING  1960     COLONOSCOPY  1969     HIV SCREENING  1974     DTAP/TDAP/TD IMMUNIZATION (1 - Tdap) 1984     ZOSTER IMMUNIZATION (1 of 2) 2009     PREVENTIVE CARE VISIT  2018     PHQ-2  2019     INFLUENZA VACCINE (1) 2019       No history of abnormal Pap smears.  Never had a colonoscopy.             Current Medications:     has a current medication list which includes the following prescription(s): ibuprofen, oxycodone-acetaminophen, atovaquone-proguanil, vitamin d3, famotidine, and ondansetron.       Allergies:     [unfilled]        Social History:     Social  "History     Tobacco Use     Smoking status: Never Smoker     Smokeless tobacco: Never Used   Substance Use Topics     Alcohol use: No       History   Drug Use No           Family History:     No family history of cancer.           Family History   Problem Relation Age of Onset     Cerebrovascular Disease Mother          Physical Exam:     /74   Pulse 109   Temp 98.4  F (36.9  C) (Oral)   Ht 1.52 m (4' 11.84\")   Wt 62.6 kg (138 lb)   SpO2 100%   BMI 27.09 kg/m    Body mass index is 27.09 kg/m .    General Appearance: healthy and alert, no distress     Musculoskeletal: extremities non tender and without edema    Neurological: normal gait, no gross defects     Psychiatric: appropriate mood and affect                               ABDOMEN:  Soft.  Mild right lower quadrant tenderness.  There is a mass posterior to umbilicus that is palpable.  No organomegaly.     PELVIC:  External genitalia, patient has a 1 cm raised, soft, white yellowish exophytic lesion centrally on her right labia minora.  Speculum reveals some thickening and hyeprvascularity on the cervix at the 12 o'clock, as well as the 4 and 7-o'clock position.  A Pap smear was taken.  Bimanual exam reveals a mobile right pelvic mass.  Smooth.  Rectovaginal confirms.      PROCEDURE:  The patient was consented for an endometrial biopsy.  This was done without difficulties.  Cervix was also biopsied at 12, 4 and 7-o'clock position.  Good hemostasis was obtained with silver nitrate.  The 1 cm lesion centrally on the right labia majora was also biopsied after injecting 3 mL of 1% lidocaine and a 3 mm punch biopsy was done.  Patient tolerated that as well.  Silver nitrate was used and good hemostasis was obtained.  The patient was in stable condition at the end of the procedure.           Assessment:    Gerardo Valenzuela is a 59 year old woman with a new diagnosis of pelvic mass.     A total of 60 minutes was spent with the patient, 40 minutes of which " were spent in counseling the patient and/or treatment planning. Does not include time for procedure.      1.  Complex pelvic mass.   2.   of 6  3.  Cervical lesion.   4.  Vulvar lesion.   5.  Thickened endometrial stripe.      I discussed with the patient as well as the family, we will await the path results, biopsy results.  We will also obtain a CT of her chest.  We will get her a letter for work and then see her back after those results for planning of surgery.  The patient agrees with the plan.  She is very appreciative of her care.  All questions were answered.             Thank you for allowing us to participate in the care of your patient.         Sincerely,        Patient Care Team:  Bibiana Najera APRN CNP as PCP - General (Nurse Practitioner - Family)  Bibiana Najera APRN CNP as Assigned PCP  Gume Velazquez MD as Referring Physician (OB/Gyn)  Prashant Sawyer MD as MD (Obstetrics & Gynecology, Maternal & Fetal Medicine)  GUME VELAZQUEZ

## 2019-09-18 NOTE — LETTER
To Whom It MayConcern:       Gerardo Valenzuela  was seen in our clinic on 9/18/2019        Patient may work as tolerated. Patient having extreme abdominal pain due to a mass that may keep her from working.  She also will have multiple up coming MD appointments to determine a treatment plan.                     Restrictions: Work as tolerated     Comments: Please call with any questions [phone]         Provider Signature:         Prashant Sawyer MD

## 2019-09-19 ENCOUNTER — ANCILLARY PROCEDURE (OUTPATIENT)
Dept: CT IMAGING | Facility: CLINIC | Age: 59
End: 2019-09-19
Attending: OBSTETRICS & GYNECOLOGY
Payer: COMMERCIAL

## 2019-09-19 DIAGNOSIS — R19.00 PELVIC MASS IN FEMALE: ICD-10-CM

## 2019-09-19 LAB — COPATH REPORT: NORMAL

## 2019-09-19 RX ORDER — IOPAMIDOL 755 MG/ML
68 INJECTION, SOLUTION INTRAVASCULAR ONCE
Status: COMPLETED | OUTPATIENT
Start: 2019-09-19 | End: 2019-09-19

## 2019-09-19 RX ADMIN — IOPAMIDOL 68 ML: 755 INJECTION, SOLUTION INTRAVASCULAR at 11:30

## 2019-09-19 NOTE — DISCHARGE INSTRUCTIONS

## 2019-09-23 DIAGNOSIS — R19.00 PELVIC MASS IN FEMALE: Primary | ICD-10-CM

## 2019-09-23 DIAGNOSIS — R93.89 ABNORMAL CT SCAN: ICD-10-CM

## 2019-09-24 ENCOUNTER — TELEPHONE (OUTPATIENT)
Dept: ONCOLOGY | Facility: CLINIC | Age: 59
End: 2019-09-24

## 2019-09-24 LAB
COPATH REPORT: NORMAL
PAP: NORMAL

## 2019-09-24 NOTE — TELEPHONE ENCOUNTER
NOT DR. VAZQUEZ, DR. VINSON      Left voicemail to confirm scheduled appointments for 10/4 here at the [facility].      PAC at 1 PM  [facility] consult at 3 PM  Labs at 4 PM

## 2019-09-25 ENCOUNTER — PRE VISIT (OUTPATIENT)
Dept: SURGERY | Facility: CLINIC | Age: 59
End: 2019-09-25

## 2019-09-25 ENCOUNTER — TELEPHONE (OUTPATIENT)
Dept: ONCOLOGY | Facility: CLINIC | Age: 59
End: 2019-09-25

## 2019-09-25 LAB
FINAL DIAGNOSIS: NORMAL
HPV HR 12 DNA CVX QL NAA+PROBE: NEGATIVE
HPV16 DNA SPEC QL NAA+PROBE: NEGATIVE
HPV18 DNA SPEC QL NAA+PROBE: NEGATIVE
SPECIMEN DESCRIPTION: NORMAL
SPECIMEN SOURCE CVX/VAG CYTO: NORMAL

## 2019-09-25 NOTE — TELEPHONE ENCOUNTER
Received VM from Rupinder gomez), confirmed PAC/consult/labs on 10/4.    Spoke about potential dates for surgery. I will call her when I get orders.

## 2019-09-25 NOTE — TELEPHONE ENCOUNTER
FUTURE VISIT INFORMATION      SURGERY INFORMATION:    Date: [facility]      RECORDS REQUESTED FROM:       Primary Care Provider: Bibiana Najera APRN CNP- [facility]    Most recent EKG+ Tracin13    Most recent ECHO: 2009    Most recent Cardiac Stress Test: 13

## 2019-09-30 ENCOUNTER — HEALTH MAINTENANCE LETTER (OUTPATIENT)
Age: 59
End: 2019-09-30

## 2019-10-01 ENCOUNTER — TELEPHONE (OUTPATIENT)
Dept: ONCOLOGY | Facility: CLINIC | Age: 59
End: 2019-10-01

## 2019-10-01 PROBLEM — R19.00 PELVIC MASS IN FEMALE: Status: ACTIVE | Noted: 2019-10-01

## 2019-10-01 RX ORDER — CLINDAMYCIN PHOSPHATE 900 MG/50ML
900 INJECTION, SOLUTION INTRAVENOUS
Status: CANCELLED | OUTPATIENT
Start: 2019-10-01

## 2019-10-01 RX ORDER — CLINDAMYCIN PHOSPHATE 900 MG/50ML
900 INJECTION, SOLUTION INTRAVENOUS SEE ADMIN INSTRUCTIONS
Status: CANCELLED | OUTPATIENT
Start: 2019-10-01

## 2019-10-01 RX ORDER — GENTAMICIN SULFATE 100 MG/100ML
1.7 INJECTION, SOLUTION INTRAVENOUS SEE ADMIN INSTRUCTIONS
Status: CANCELLED | OUTPATIENT
Start: 2019-10-01

## 2019-10-01 RX ORDER — PHENAZOPYRIDINE HYDROCHLORIDE 200 MG/1
200 TABLET, FILM COATED ORAL ONCE
Status: CANCELLED | OUTPATIENT
Start: 2019-10-01 | End: 2019-10-01

## 2019-10-01 NOTE — TELEPHONE ENCOUNTER
I scheduled surgery for this patient with Dr. Sawyer on 10/7 at [place]. Scheduled per availability.    I called the patient and was able to confirm the scheduled dates with her daughter, Rupinder.    The RN has provided them with education and a packet regarding their procedure.     They are aware that they will receive a call  ~2 days prior to the scheduled procedure and will be given an exact arrival/start time.    PAC/H&P: 10/4 - will also see Dr. Sawyer    Post-Op: 10/30 at 2 PM

## 2019-10-04 ENCOUNTER — ANESTHESIA EVENT (OUTPATIENT)
Dept: SURGERY | Facility: CLINIC | Age: 59
DRG: 742 | End: 2019-10-04
Payer: COMMERCIAL

## 2019-10-04 ENCOUNTER — OFFICE VISIT (OUTPATIENT)
Dept: SURGERY | Facility: CLINIC | Age: 59
End: 2019-10-04
Payer: COMMERCIAL

## 2019-10-04 ENCOUNTER — ONCOLOGY VISIT (OUTPATIENT)
Dept: ONCOLOGY | Facility: CLINIC | Age: 59
End: 2019-10-04
Attending: OBSTETRICS & GYNECOLOGY
Payer: COMMERCIAL

## 2019-10-04 VITALS
RESPIRATION RATE: 16 BRPM | TEMPERATURE: 98 F | SYSTOLIC BLOOD PRESSURE: 114 MMHG | DIASTOLIC BLOOD PRESSURE: 78 MMHG | HEIGHT: 60 IN | BODY MASS INDEX: 26.31 KG/M2 | OXYGEN SATURATION: 97 % | HEART RATE: 80 BPM | WEIGHT: 134 LBS

## 2019-10-04 VITALS
OXYGEN SATURATION: 97 % | HEIGHT: 60 IN | TEMPERATURE: 98 F | RESPIRATION RATE: 16 BRPM | WEIGHT: 134 LBS | DIASTOLIC BLOOD PRESSURE: 78 MMHG | HEART RATE: 80 BPM | SYSTOLIC BLOOD PRESSURE: 114 MMHG | BODY MASS INDEX: 26.31 KG/M2

## 2019-10-04 DIAGNOSIS — Z01.818 PREOP EXAMINATION: ICD-10-CM

## 2019-10-04 DIAGNOSIS — R19.00 PELVIC MASS IN FEMALE: ICD-10-CM

## 2019-10-04 DIAGNOSIS — R19.00 PELVIC MASS IN FEMALE: Primary | ICD-10-CM

## 2019-10-04 DIAGNOSIS — R19.00 PELVIC MASS: Primary | ICD-10-CM

## 2019-10-04 PROCEDURE — G0463 HOSPITAL OUTPT CLINIC VISIT: HCPCS | Mod: ZF

## 2019-10-04 PROCEDURE — 99214 OFFICE O/P EST MOD 30 MIN: CPT | Mod: ZP | Performed by: OBSTETRICS & GYNECOLOGY

## 2019-10-04 RX ORDER — CHOLECALCIFEROL (VITAMIN D3) 50 MCG
1 TABLET ORAL DAILY
COMMUNITY

## 2019-10-04 ASSESSMENT — PAIN SCALES - GENERAL: PAINLEVEL: NO PAIN (0)

## 2019-10-04 ASSESSMENT — MIFFLIN-ST. JEOR
SCORE: 1099.32
SCORE: 1099.32

## 2019-10-04 NOTE — NURSING NOTE
Pre Op Nurse Teaching Template    Relevant Diagnosis: Pelvic Mass    Teaching Topic: Laparoscopic BSO, possible open, possible cancer staging, possible hysterectomy, possible debulking      Person(s) involved in teaching :  Patient  & other: Family members  Motivation Level:  Asks Questions:    Yes      Eager to Learn:     Yes     Cooperative:          Yes    Receptive (willing. Able to accept information):    Yes      Patient and those who are listed above demonstrates understanding of the following:   Reason for the appointment, diagnosis and treatment plan:   Yes   Knowledge of proper use of medications and conditions for which they are ordered (with special attention to potential side effects or drug interactions): Yes   Which situations necessitate calling provider and whom to contact: Yes         Nutritional needs and diet plan:  Yes      Pain management techniques:     Yes, Pain Scale   Diet:   Yes, [facility] Diet Instructions    Teaching Concerns addressed: Yes    Infection Prevention:  Patient and those who are listed above demonstrate understanding of the following:  Pre-Op CHG Bathing Instructions: Yes  Surgical procedure site care taught:   Yes   Signs and symptoms of infection taught: Yes       Instructional Materials Used/Given:  The [place] Before You Surgery Booklet    Hysterectomy Guidelines  Pain Assessment Tool   Home Care after Major Abdominal or Vaginal Surgery  Map  Accommodations Brochure  Phone numbers for [facility] and [facility]  Copy of Surgical Consent    Comments:  JESSICA Dowd RN

## 2019-10-04 NOTE — H&P
Pre-Operative H & P     CC:  Preoperative exam to assess for increased cardiopulmonary risk while undergoing surgery and anesthesia.    Date of Encounter: 10/4/2019  Primary Care Physician:  Bibiana Najera  Reason for visit: pelvic mass  HPI  Gerardo Valenzuela is a 59 year old female who presents for pre-operative H & P in preparation for Bilateral SALPINGO-OOPHORECTOMY, LAPAROSCOPIC,possible open, possible hysterectomy, possible cancer staging, possible debulking with Dr. Sawyer on 10/7/19 at [facility]. History is obtained from the patient and daughters.     Patient who was recently evaluated by Dr. Sawyer with newly discovered pelvic mass after presenting for right side pelvic pain. She was seen in ED where US revealed a complex 11 cm pelvic mass. She was counseled for above procedure.    Her history is otherwise significant for HLD, GERD and pre DIABETES MELLITUS.      Past Medical History  Past Medical History:   Diagnosis Date     GERD (gastroesophageal reflux disease)      Mixed hyperlipidemia 8/20/2018     Pelvic mass in female 10/1/2019    Added automatically from request for surgery [id number]     Prediabetes 8/20/2018       Past Surgical History  Past Surgical History:   Procedure Laterality Date     HEMORRHOIDECTOMY  1981    in [place]       Hx of Blood transfusions/reactions: Denies.      Hx of abnormal bleeding or anti-platelet use: Denies.     Menstrual history: No LMP recorded. Patient is postmenopausal.    Steroid use in the last year: Denies.     Personal or FH with difficulty with Anesthesia:  Denies.     Prior to Admission Medications  Current Outpatient Medications   Medication Sig Dispense Refill     ibuprofen (ADVIL/MOTRIN) 800 MG tablet Take 1 tablet (800 mg) by mouth every 8 hours as needed for moderate pain 30 tablet 0     vitamin D3 (CHOLECALCIFEROL) 2000 units (50 mcg) tablet Take 1 tablet by mouth daily       famotidine (PEPCID) 20 MG  tablet Take 1 tablet (20 mg) by mouth 2 times daily (Patient not taking: Reported on 9/18/2019) 90 tablet 3     ondansetron (ZOFRAN) 4 MG tablet Take 1 tablet (4 mg) by mouth every 8 hours as needed for nausea (Patient not taking: Reported on 9/18/2019) 6 tablet 0     oxyCODONE-acetaminophen (PERCOCET) 5-325 MG tablet Take 1-2 tablets by mouth every 4 hours as needed for pain (Patient not taking: Reported on 10/4/2019) 12 tablet 0       Allergies  Allergies   Allergen Reactions     Penicillin [Penicillins]        Social History  Social History     Socioeconomic History     Marital status:      Spouse name: Not on file     Number of children: Not on file     Years of education: Not on file     Highest education level: Not on file   Occupational History     Occupation: Housekeeping     Employer: [employer]   Social Needs     Financial resource strain: Not on file     Food insecurity:     Worry: Not on file     Inability: Not on file     Transportation needs:     Medical: Not on file     Non-medical: Not on file   Tobacco Use     Smoking status: Never Smoker     Smokeless tobacco: Never Used   Substance and Sexual Activity     Alcohol use: No     Drug use: No     Sexual activity: Yes     Partners: Male   Lifestyle     Physical activity:     Days per week: Not on file     Minutes per session: Not on file     Stress: Not on file   Relationships     Social connections:     Talks on phone: Not on file     Gets together: Not on file     Attends Yazdanism service: Not on file     Active member of club or organization: Not on file     Attends meetings of clubs or organizations: Not on file     Relationship status: Not on file     Intimate partner violence:     Fear of current or ex partner: Not on file     Emotionally abused: Not on file     Physically abused: Not on file     Forced sexual activity: Not on file   Other Topics Concern     Parent/sibling w/ CABG, MI or angioplasty before 65F 55M? No   Social  History Narrative     Not on file       Family History  Family History   Problem Relation Age of Onset     Cerebrovascular Disease Mother         CVA       Review of Systems    ROS/MED HX  The complete review of systems is negative other than noted in the HPI or here.   ENT/Pulmonary:  - neg pulmonary ROS     Neurologic:  - neg neurologic ROS     Cardiovascular:     (+) Dyslipidemia, ----. : . . . :. . Previous cardiac testing date:results:date: results:ECG reviewed date:2013 results:SR date: results:          METS/Exercise Tolerance:  4 - Raking leaves, gardening   Hematologic:  - neg hematologic  ROS       Musculoskeletal:  NEG      GI/Hepatic:  - neg GI/hepatic ROS       Renal/Genitourinary:  - ROS Renal section negative       Endo:  - neg endo ROS       Psychiatric:  - neg psychiatric ROS       Infectious Disease:  - neg infectious disease ROS       Malignancy:   (+) Malignancy   Pelvic mass        Other:    (+) C-spine cleared: N/A, H/O Chronic Pain,no other significant disability            PHYSICAL EXAM:   Mental Status/Neuro: A/A/O; Age Appropriate   Airway: Facies: Feasible  Mallampati: I  Mouth/Opening: Full  TM distance: > 6 cm  Neck ROM: Full   Respiratory: Auscultation: CTAB     Resp. Rate: Normal     Resp. Effort: Normal      CV: Rhythm: Regular  Heart: Normal Sounds  Edema: None   Comments:      Personally reviewed  LABS:  CBC:   Lab Results   Component Value Date    WBC 6.7 09/13/2019    WBC 6.6 08/07/2018    HGB 14.2 09/13/2019    HGB 13.6 08/07/2018    HCT 40.4 09/13/2019    HCT 40.5 08/07/2018     09/13/2019     08/07/2018     BMP:   Lab Results   Component Value Date     09/13/2019     08/07/2018    POTASSIUM 3.6 09/13/2019    POTASSIUM 4.1 08/07/2018    CHLORIDE 101 09/13/2019    CHLORIDE 105 08/07/2018    CO2 26 09/13/2019    CO2 24 08/07/2018    BUN 14 09/13/2019    BUN 14 08/07/2018    CR 0.49 (L) 09/13/2019    CR 0.67 08/07/2018     (H) 09/13/2019    GLC 93  "08/07/2018     COAGS: No results found for: PTT, INR, FIBR  POC: No results found for: BGM, HCG, HCGS  OTHER:   Lab Results   Component Value Date    A1C 5.8 (H) 08/07/2018    CORIE 9.1 09/13/2019    ALBUMIN 4.0 09/13/2019    PROTTOTAL 8.2 09/13/2019    ALT 22 09/13/2019    AST 22 09/13/2019    ALKPHOS 92 09/13/2019    BILITOTAL 0.5 09/13/2019    TSH 1.14 08/07/2018        Preop Vitals    BP Readings from Last 3 Encounters:   09/18/19 128/74   09/13/19 124/69   10/29/18 113/69    Pulse Readings from Last 3 Encounters:   09/18/19 109   09/13/19 75   08/20/18 85      Resp Readings from Last 3 Encounters:   09/13/19 16   06/20/18 16   11/27/13 16    SpO2 Readings from Last 3 Encounters:   09/18/19 100%   09/13/19 98%   08/20/18 99%      Temp Readings from Last 1 Encounters:   09/18/19 98.4  F (36.9  C) (Oral)    Ht Readings from Last 1 Encounters:   09/18/19 1.52 m (4' 11.84\")      Wt Readings from Last 1 Encounters:   09/18/19 62.6 kg (138 lb)    Estimated body mass index is 27.09 kg/m  as calculated from the following:    Height as of 9/18/19: 1.52 m (4' 11.84\").    Weight as of 9/18/19: 62.6 kg (138 lb).     Temp: 98  F (36.7  C)   BP: 114/78 Pulse: 80   Resp: 16 SpO2: 97 %         134 lbs 0 oz  5' 0\"   Body mass index is 26.17 kg/m .       Physical Exam  Constitutional: Awake, alert, cooperative, no apparent distress, and appears stated age. Accompanied by daughters.  Eyes: Pupils equal, round and reactive to light, extra ocular muscles intact, sclera clear, conjunctiva normal. Glasses on.  HENT: Normocephalic, oral pharynx with moist mucus membranes, good dentition. No goiter appreciated.   Respiratory: Clear to auscultation bilaterally, no crackles or wheezing. No cough or obvious dyspnea.  Cardiovascular: Regular rate and rhythm, normal S1 and S2, and no murmur noted.  Carotids +2, no bruits. No edema. Palpable pulses to radial  DP and PT arteries.   Lymph/Hematologic: No cervical lymphadenopathy and no " supraclavicular lymphadenopathy.  Genitourinary: Deferred.   Skin: Warm and dry.  No rashes at anticipated surgical site.   Musculoskeletal: Full ROM of neck. There is no redness, warmth, or swelling of the joints. Gross motor strength is normal.    Neurologic: Awake, alert, oriented to name, place and time. Cranial nerves II-XII are grossly intact. Gait is normal.   Neuropsychiatric: Calm, cooperative. Normal affect.     EKG: Personally reviewed 2013 Sinus rhythm  Stress test: Echo exercise stress 2013  Interpretation Summary  [place] exercise echo without infarct or ischemia at  adequate heart rate. Fair   exercise capacity (leg pain). LV size and function change appropriatley with   stress    9/19/19 CT Chest  Impression:   1. Focal groundglass opacity/nodule in the anterior right lower lobe  may be infectious/inflammatory, though primary lung malignancy may  have a similar appearance. This would be an atypical appearance for  metastatic disease. Recommend attention on short-term follow-up.  2. A few scattered small pulmonary nodules in the right upper lobe are  technically indeterminant but not highly suspicious.  3. Biapical scarring.    US Pelvic 9/13/19  IMPRESSION: Mass in the pelvis appears separate from the uterus by  ultrasound, then usually pedunculated fibroid and ovarian mass are  considerations in the differential including malignancy.     CT abd/pelvis 9/13/19                                                                   IMPRESSION:   1. Large indeterminate lesion in the pelvis, consider pelvic  ultrasound for further evaluation.  2. No renal, ureteral, or bladder stones.    Imaging and cardiac testing reviewed by this provider      ASSESSMENT and PLAN  Gerardo Valenzuela is a 59 year old female scheduled to undergo Bilateral SALPINGO-OOPHORECTOMY, LAPAROSCOPIC,possible open, possible hysterectomy, possible cancer staging, possible debulking with Dr. Sawyer on 10/7/19. She has the following  specific operative considerations:   - RCRI : 0.9% risk of major adverse cardiac event.   - Anesthesia considerations:  Refer to PAC assessment in anesthesia records  - VTE risk: 3%  - ADINA # of risks 1/8 = Low risk  - Risk of PONV score = 3.  If > 2, anti-emetic intervention recommended. If 3 or > anti emetic intervention recommended with two or more meds    --Complex pelvic mass with above procedure now planned.   --No cardiac history, symptoms or meds. Testing above. Good activity tolerance.    --Nonsmoker. Denies pulmonary symptoms.   --GERD Occasional Pepcid  --Pain management. Discussed possibility of nerve block if appropriate for patient's procedure. Final counseling and decisions by regional team on DOS.    Type and screen drawn today.     Arrival time, NPO, shower and medication instructions provided by nursing staff today. Preparing For Your Surgery handout given.      Patient was reviewed by Dr Teran.    IVONNE Brown CNS  Preoperative Assessment Center  [facility]  Clinic and Surgery Center  Phone: [phone]  Fax: [phone]

## 2019-10-04 NOTE — NURSING NOTE
Oncology Rooming Note    October 4, 2019 2:37 PM   Gerardo Valenzuela is a 59 year old female who presents for:    Chief Complaint   Patient presents with     Oncology Clinic Visit     Return Pelvic Mass     Initial Vitals: /78   Pulse 80   Temp 98  F (36.7  C) (Oral)   Resp 16   Ht 1.524 m (5')   Wt 60.8 kg (134 lb)   LMP  (LMP Unknown)   SpO2 97%   Breastfeeding? No   BMI 26.17 kg/m   Estimated body mass index is 26.17 kg/m  as calculated from the following:    Height as of this encounter: 1.524 m (5').    Weight as of this encounter: 60.8 kg (134 lb). Body surface area is 1.6 meters squared.  No Pain (0) Comment: Data Unavailable   No LMP recorded (lmp unknown). Patient is postmenopausal.  Allergies reviewed: Yes  Medications reviewed: Yes    Medications: Medication refills not needed today.  Pharmacy name entered into [facility]: [facility] [place], [place] - [address]    Clinical concerns: surgery consent       Nikki Wilson CMA

## 2019-10-04 NOTE — ANESTHESIA PREPROCEDURE EVALUATION
Anesthesia Pre-Procedure Evaluation    Patient: Gerardo Valenzuela   MRN:     [id number] Gender:   female   Age:    59 year old :      1960        Preoperative Diagnosis: Pelvic mass in female [R19.00]   Procedure(s):  Bilateral SALPINGO-OOPHORECTOMY, LAPAROSCOPIC,  possible open, possible hysterectomy, possible cancer staging, possible debulking     Past Medical History:   Diagnosis Date     Mixed hyperlipidemia 2018     Pelvic mass in female 10/1/2019    Added automatically from request for surgery [id number]     Prediabetes 2018      No past surgical history on file.       Anesthesia Evaluation     . Pt has had prior anesthetic. Type: MAC and Regional    No history of anesthetic complications          ROS/MED HX    ENT/Pulmonary:  - neg pulmonary ROS     Neurologic:  - neg neurologic ROS     Cardiovascular:     (+) Dyslipidemia, ----. : . . . :. . Previous cardiac testing date:results:Stress Testdate: results:ECG reviewed date: results:SR date: results:         (-) taking anticoagulants/antiplatelets   METS/Exercise Tolerance:  >4 METS   Hematologic:  - neg hematologic  ROS       Musculoskeletal:  - neg musculoskeletal ROS       GI/Hepatic:  - neg GI/hepatic ROS   (+) GERD Other,       Renal/Genitourinary:  - ROS Renal section negative       Endo:  - neg endo ROS       Psychiatric:  - neg psychiatric ROS       Infectious Disease:  - neg infectious disease ROS       Malignancy:   (+) Malignancy   Pelvic mass        Other:    (+) C-spine cleared: N/A, no H/O Chronic Pain,no other significant disability                        PHYSICAL EXAM:   Mental Status/Neuro: A/A/O; Age Appropriate   Airway: Facies: Feasible  Mallampati: II  Mouth/Opening: Full  TM distance: > 6 cm  Neck ROM: Full   Respiratory: Auscultation: CTAB     Resp. Rate: Normal     Resp. Effort: Normal      CV: Rhythm: Regular  Heart: Normal Sounds  Edema: None   Comments:      Dental: Normal Dentition                LABS:  CBC:  "  Lab Results   Component Value Date    WBC 6.7 09/13/2019    WBC 6.6 08/07/2018    HGB 14.2 09/13/2019    HGB 13.6 08/07/2018    HCT 40.4 09/13/2019    HCT 40.5 08/07/2018     09/13/2019     08/07/2018     BMP:   Lab Results   Component Value Date     09/13/2019     08/07/2018    POTASSIUM 3.6 09/13/2019    POTASSIUM 4.1 08/07/2018    CHLORIDE 101 09/13/2019    CHLORIDE 105 08/07/2018    CO2 26 09/13/2019    CO2 24 08/07/2018    BUN 14 09/13/2019    BUN 14 08/07/2018    CR 0.49 (L) 09/13/2019    CR 0.67 08/07/2018     (H) 09/13/2019    GLC 93 08/07/2018     COAGS: No results found for: PTT, INR, FIBR  POC: No results found for: BGM, HCG, HCGS  OTHER:   Lab Results   Component Value Date    A1C 5.8 (H) 08/07/2018    CORIE 9.1 09/13/2019    ALBUMIN 4.0 09/13/2019    PROTTOTAL 8.2 09/13/2019    ALT 22 09/13/2019    AST 22 09/13/2019    ALKPHOS 92 09/13/2019    BILITOTAL 0.5 09/13/2019    TSH 1.14 08/07/2018        Preop Vitals    BP Readings from Last 3 Encounters:   09/18/19 128/74   09/13/19 124/69   10/29/18 113/69    Pulse Readings from Last 3 Encounters:   09/18/19 109   09/13/19 75   08/20/18 85      Resp Readings from Last 3 Encounters:   09/13/19 16   06/20/18 16   11/27/13 16    SpO2 Readings from Last 3 Encounters:   09/18/19 100%   09/13/19 98%   08/20/18 99%      Temp Readings from Last 1 Encounters:   09/18/19 98.4  F (36.9  C) (Oral)    Ht Readings from Last 1 Encounters:   09/18/19 1.52 m (4' 11.84\")      Wt Readings from Last 1 Encounters:   09/18/19 62.6 kg (138 lb)    Estimated body mass index is 27.09 kg/m  as calculated from the following:    Height as of 9/18/19: 1.52 m (4' 11.84\").    Weight as of 9/18/19: 62.6 kg (138 lb).     LDA:        Assessment:   ASA SCORE: 3    H&P: History and physical reviewed and following examination; no interval change.         Plan:   Anes. Type:  General   Pre-Medication: None   Induction:  IV (Standard)   Airway: ETT; Oral "   Access/Monitoring: PIV   Maintenance: Balanced     Postop Plan:   Postop Pain: Opioids  Postop Sedation/Airway: Not planned     PONV Management:   Adult Risk Factors: Female, Postop Opioids   Prevention: Ondansetron, Dexamethasone     CONSENT: Direct conversation   Plan and risks discussed with: Patient                   PAC Discussion and Assessment    ASA Classification: 3  Case is suitable for: [place]  Anesthetic techniques and relevant risks discussed: GA and GA with regional block for post-op pain control  Invasive monitoring and risk discussed: No  Types:   Possibility and Risk of blood transfusion discussed: Yes  NPO instructions given:   Additional anesthetic preparation and risks discussed:   Needs early admission to pre-op area:   Other:     PAC Resident/NP Anesthesia Assessment:  Gerardo Valenzuela is a 59 year old female scheduled to undergo Bilateral SALPINGO-OOPHORECTOMY, LAPAROSCOPIC,possible open, possible hysterectomy, possible cancer staging, possible debulking with Dr. Sawyer on 10/7/19. She has the following specific operative considerations:   - RCRI : 0.9% risk of major adverse cardiac event.   - VTE risk: 3%  - ADINA # of risks 1/8 = Low risk  - Risk of PONV score = 3.  If > 2, anti-emetic intervention recommended. If 3 or > anti emetic intervention recommended with two or more meds    No GA. Spinal for hemorrhoidectomy years ago.     --Complex pelvic mass with above procedure now planned.   --No cardiac history, symptoms or meds. Testing above. Good activity tolerance.    --Nonsmoker. Denies pulmonary symptoms.   --GERD Occ Pepcid  --Pain management. Discussed possibility of nerve block if appropriate for patient's procedure. Final counseling and decisions by regional team on DOS.    Type and screen drawn today.         Patient was reviewed by Dr Teran.        Reviewed and Signed by PAC Mid-Level Provider/Resident  Mid-Level Provider/Resident: Bhumi Lizarraga, APRN, CNS  Date:  10/4/19  Time: 12:42pm    Attending Anesthesiologist Anesthesia Assessment:        Anesthesiologist:   Date:   Time:   Pass/Fail:   Disposition:     PAC Pharmacist Assessment:        Pharmacist:   Date:   Time:    IVONNE Brown

## 2019-10-04 NOTE — LETTER
10/4/2019       RE: Gerardo Valenzuela  [address] [place] [id number]     Dear Colleague,    Thank you for referring your patient, Gerardo Valenzuela, to the [facility]. Please see a copy of my visit note below.                Follow Up Notes on Referred Patient    Date: 10/7/2019       Dr. Prashant Sawyer MD  [address] [place] [address]       RE: Gerardo Valenzuela  : 1960  JOANA: 10/4/2019    Dear Dr. Prashant Sawyer:    Gerardo Valenzuela is a 59 year old woman with a diagnosis of complex pelvic mass.     Patient presents today for followup.  No new symptoms since the last time I have seen her.           Past Medical History:    Past Medical History:   Diagnosis Date     GERD (gastroesophageal reflux disease)      Mixed hyperlipidemia 2018     Pelvic mass in female 10/1/2019    Added automatically from request for surgery [id number]     Prediabetes 2018         Past Surgical History:    Past Surgical History:   Procedure Laterality Date     HEMORRHOIDECTOMY      in [place]         Health Maintenance Due   Topic Date Due     ADVANCE CARE PLANNING  1960     COLONOSCOPY  1969     HIV SCREENING  1974     DTAP/TDAP/TD IMMUNIZATION (1 - Tdap) 1984     ZOSTER IMMUNIZATION (1 of 2) 2009     PREVENTIVE CARE VISIT  2018     PHQ-2  2019     INFLUENZA VACCINE (1) 2019       Current Medications:     No current outpatient medications on file.         Allergies:        Allergies   Allergen Reactions     Penicillin [Penicillins]         Social History:     Social History     Tobacco Use     Smoking status: Never Smoker     Smokeless tobacco: Never Used   Substance Use Topics     Alcohol use: No       History   Drug Use No         Family History:     Family History   Problem Relation Age of Onset     Cerebrovascular Disease Mother         CVA         Physical Exam:     /78   Pulse 80   Temp 98  F (36.7  C) (Oral)    Resp 16   Ht 1.524 m (5')   Wt 60.8 kg (134 lb)   LMP  (LMP Unknown)   SpO2 97%   Breastfeeding? No   BMI 26.17 kg/m     Body mass index is 26.17 kg/m .    General Appearance: healthy and alert, no distress    Neurological: normal gait, no gross defects     Psychiatric: appropriate mood and affect                                   Assessment:    Gerardo Valenzuela is a 59 year old woman with a diagnosis of complex pelvic mass.     A total of 25 minutes was spent with the patient, 20 minutes of which were spent in counseling the patient and/or treatment planning.      1.  Complex pelvic mass.   2.  Normal .        Discussed with the patient as well as with the family we will proceed with a laparoscopic bilateral salpingo-oophorectomy.  If we acquire cancer and cannot proceed minimally invasively, we would proceed with an exploratory laparotomy, possible hysterectomy.  If we do acquire a cancer, possible cancer staging versus debulking.  She will be seen by my colleagues in Anesthesia for preoperative optimization.  We plan to do this next Monday.  Patient agrees with the plan.  She is very appreciative of her care.  All questions were answered.       Prashant Sawyer MD, MS    Department of Obstetrics and Gynecology   Division of Gynecologic Oncology   [facility]  Phone: [phone]        CC  Patient Care Team:  Bibiana Najera APRN CNP as PCP - General (Nurse Practitioner - Family)  Krystyna Perera MD as Referring Physician (OB/Gyn)

## 2019-10-04 NOTE — PATIENT INSTRUCTIONS
Preparing for Your Surgery      Name:  Gerardo Valenzuela   MRN:  [id number]   :  1960   Today's Date:  10/4/2019     Arriving for surgery:  Surgery date:   10/07/2019  Arrival time:  9:00 am  Please come to:     [facility] [address]  [address] [place]  [address]    - ? parking is available in front of the hospital      -    Please proceed to [address] on the 3rd floor. [phone]?  - ?If you are in need of directions, wheelchair or escort please stop at the Information Desk in the lobby.  Inform the information person that you are here for surgery; a wheelchair and escort to [address] will be provided.?     What can I eat or drink?  -  You may have solid food or milk products until 8 hours prior to your surgery.(3 am)  -  You may have water, apple juice or 7up/Sprite until 2 hours prior to your surgery.(until 9 am arrival time)    Which medicines can I take?        Stop Aspirin, vitamins and supplements one week prior to surgery.       Hold Ibuprofen for 24 hours and/or Naproxen for 48 hours prior to surgery.     -  Please take these medications the day of surgery:  NONE      How do I prepare myself?  -  Take two showers: one the night before surgery; and one the morning of surgery.         Use Scrubcare or Hibiclens to wash from neck down, leave soap on your skin for up to one minute.      Do not get soap in your eyes or ears.  You may use your own shampoo and conditioner; no other hair products.   -  Do NOT use lotion, powder, deodorant, or antiperspirant the day of your surgery.  -  Do NOT wear any makeup, fingernail polish or jewelry.  - Do not bring your own medications to the hospital, except for inhalers and eye   drops.  -  Bring your ID and insurance card.        Questions or Concerns:  -Please call the Pre Admission Nursing Office at [phone].       -For questions after surgery please call your surgeons office.       AFTER YOUR  SURGERY  Breathing exercises   Breathing exercises help you recover faster. Take deep breaths and let the air out slowly. This will:     Help you wake up after surgery.    Help prevent complications like pneumonia.  Preventing complications will help you go home sooner.   We may give you a breathing device (incentive spirometer) to encourage you to breathe deeply.   Nausea and vomiting   You may feel sick to your stomach after surgery; if so, let your nurse know.    Pain control:  After surgery, you may have pain. Our goal is to help you manage your pain. Pain medicine will help you feel comfortable enough to do activities that will help you heal.  These activities may include breathing exercises, walking and physical therapy.   To help your health care team treat your pain we will ask: 1) If you have pain  2) where it is located 3) describe your pain in your words  Methods of pain control include medications given by mouth, vein or by nerve block for some surgeries.  Sequential Compression Device (SCD) or Pneumo Boots:  You may need to wear SCD S on your legs or feet. These are wraps connected to a machine that pumps in air and releases it. The repeated pumping helps prevent blood clots from forming.

## 2019-10-05 LAB
ABO + RH BLD: NORMAL
ABO + RH BLD: NORMAL
BLD GP AB SCN SERPL QL: NORMAL
BLOOD BANK CMNT PATIENT-IMP: NORMAL
BLOOD BANK CMNT PATIENT-IMP: NORMAL
SPECIMEN EXP DATE BLD: NORMAL

## 2019-10-07 ENCOUNTER — ANESTHESIA (OUTPATIENT)
Dept: SURGERY | Facility: CLINIC | Age: 59
DRG: 742 | End: 2019-10-07
Payer: COMMERCIAL

## 2019-10-07 ENCOUNTER — SURGERY (OUTPATIENT)
Age: 59
End: 2019-10-07
Payer: COMMERCIAL

## 2019-10-07 ENCOUNTER — HOSPITAL ENCOUNTER (INPATIENT)
Facility: CLINIC | Age: 59
LOS: 3 days | Discharge: HOME | DRG: 742 | End: 2019-10-10
Attending: OBSTETRICS & GYNECOLOGY | Admitting: OBSTETRICS & GYNECOLOGY
Payer: COMMERCIAL

## 2019-10-07 DIAGNOSIS — R19.00 PELVIC MASS IN FEMALE: Primary | ICD-10-CM

## 2019-10-07 DIAGNOSIS — R19.00 PELVIC MASS IN FEMALE: ICD-10-CM

## 2019-10-07 LAB — GLUCOSE BLDC GLUCOMTR-MCNC: 117 MG/DL (ref 70–99)

## 2019-10-07 PROCEDURE — 36000057 ZZH SURGERY LEVEL 3 1ST 30 MIN - UMMC: Performed by: OBSTETRICS & GYNECOLOGY

## 2019-10-07 PROCEDURE — 25000125 ZZHC RX 250: Performed by: OBSTETRICS & GYNECOLOGY

## 2019-10-07 PROCEDURE — 25000128 H RX IP 250 OP 636: Performed by: ANESTHESIOLOGY

## 2019-10-07 PROCEDURE — 27210794 ZZH OR GENERAL SUPPLY STERILE: Performed by: OBSTETRICS & GYNECOLOGY

## 2019-10-07 PROCEDURE — 25800030 ZZH RX IP 258 OP 636: Performed by: OBSTETRICS & GYNECOLOGY

## 2019-10-07 PROCEDURE — 25000128 H RX IP 250 OP 636: Performed by: STUDENT IN AN ORGANIZED HEALTH CARE EDUCATION/TRAINING PROGRAM

## 2019-10-07 PROCEDURE — 25000128 H RX IP 250 OP 636: Performed by: NURSE ANESTHETIST, CERTIFIED REGISTERED

## 2019-10-07 PROCEDURE — 25800030 ZZH RX IP 258 OP 636: Performed by: NURSE ANESTHETIST, CERTIFIED REGISTERED

## 2019-10-07 PROCEDURE — 58720 REMOVAL OF OVARY/TUBE(S): CPT | Mod: GC | Performed by: OBSTETRICS & GYNECOLOGY

## 2019-10-07 PROCEDURE — 36000059 ZZH SURGERY LEVEL 3 EA 15 ADDTL MIN UMMC: Performed by: OBSTETRICS & GYNECOLOGY

## 2019-10-07 PROCEDURE — 0WJJ4ZZ INSPECTION OF PELVIC CAVITY, PERCUTANEOUS ENDOSCOPIC APPROACH: ICD-10-PCS | Performed by: OBSTETRICS & GYNECOLOGY

## 2019-10-07 PROCEDURE — 25800030 ZZH RX IP 258 OP 636: Performed by: ANESTHESIOLOGY

## 2019-10-07 PROCEDURE — 37000008 ZZH ANESTHESIA TECHNICAL FEE, 1ST 30 MIN: Performed by: OBSTETRICS & GYNECOLOGY

## 2019-10-07 PROCEDURE — 25000565 ZZH ISOFLURANE, EA 15 MIN: Performed by: OBSTETRICS & GYNECOLOGY

## 2019-10-07 PROCEDURE — 37000009 ZZH ANESTHESIA TECHNICAL FEE, EACH ADDTL 15 MIN: Performed by: OBSTETRICS & GYNECOLOGY

## 2019-10-07 PROCEDURE — 3E1M38X IRRIGATION OF PERITONEAL CAVITY USING IRRIGATING SUBSTANCE, PERCUTANEOUS APPROACH, DIAGNOSTIC: ICD-10-PCS | Performed by: OBSTETRICS & GYNECOLOGY

## 2019-10-07 PROCEDURE — 71000014 ZZH RECOVERY PHASE 1 LEVEL 2 FIRST HR: Performed by: OBSTETRICS & GYNECOLOGY

## 2019-10-07 PROCEDURE — 0UT20ZZ RESECTION OF BILATERAL OVARIES, OPEN APPROACH: ICD-10-PCS | Performed by: OBSTETRICS & GYNECOLOGY

## 2019-10-07 PROCEDURE — 25000131 ZZH RX MED GY IP 250 OP 636 PS 637: Performed by: STUDENT IN AN ORGANIZED HEALTH CARE EDUCATION/TRAINING PROGRAM

## 2019-10-07 PROCEDURE — 25000125 ZZHC RX 250: Performed by: NURSE ANESTHETIST, CERTIFIED REGISTERED

## 2019-10-07 PROCEDURE — 00000146 ZZHCL STATISTIC GLUCOSE BY METER IP

## 2019-10-07 PROCEDURE — 0UT70ZZ RESECTION OF BILATERAL FALLOPIAN TUBES, OPEN APPROACH: ICD-10-PCS | Performed by: OBSTETRICS & GYNECOLOGY

## 2019-10-07 PROCEDURE — 25000128 H RX IP 250 OP 636: Performed by: OBSTETRICS & GYNECOLOGY

## 2019-10-07 PROCEDURE — 12000001 ZZH R&B MED SURG/OB UMMC

## 2019-10-07 PROCEDURE — 88331 PATH CONSLTJ SURG 1 BLK 1SPC: CPT | Performed by: OBSTETRICS & GYNECOLOGY

## 2019-10-07 PROCEDURE — 88305 TISSUE EXAM BY PATHOLOGIST: CPT | Performed by: OBSTETRICS & GYNECOLOGY

## 2019-10-07 PROCEDURE — 88112 CYTOPATH CELL ENHANCE TECH: CPT | Performed by: OBSTETRICS & GYNECOLOGY

## 2019-10-07 PROCEDURE — 88307 TISSUE EXAM BY PATHOLOGIST: CPT | Performed by: OBSTETRICS & GYNECOLOGY

## 2019-10-07 PROCEDURE — 25000132 ZZH RX MED GY IP 250 OP 250 PS 637: Performed by: STUDENT IN AN ORGANIZED HEALTH CARE EDUCATION/TRAINING PROGRAM

## 2019-10-07 PROCEDURE — 40000170 ZZH STATISTIC PRE-PROCEDURE ASSESSMENT II: Performed by: OBSTETRICS & GYNECOLOGY

## 2019-10-07 PROCEDURE — 00000155 ZZHCL STATISTIC H-CELL BLOCK W/STAIN: Performed by: OBSTETRICS & GYNECOLOGY

## 2019-10-07 PROCEDURE — 71000015 ZZH RECOVERY PHASE 1 LEVEL 2 EA ADDTL HR: Performed by: OBSTETRICS & GYNECOLOGY

## 2019-10-07 RX ORDER — ESMOLOL HYDROCHLORIDE 10 MG/ML
INJECTION INTRAVENOUS PRN
Status: DISCONTINUED | OUTPATIENT
Start: 2019-10-07 | End: 2019-10-07

## 2019-10-07 RX ORDER — LIDOCAINE 40 MG/G
CREAM TOPICAL
Status: DISCONTINUED | OUTPATIENT
Start: 2019-10-07 | End: 2019-10-10 | Stop reason: HOSPADM

## 2019-10-07 RX ORDER — AMOXICILLIN 250 MG
1 CAPSULE ORAL 2 TIMES DAILY PRN
Status: DISCONTINUED | OUTPATIENT
Start: 2019-10-07 | End: 2019-10-10 | Stop reason: HOSPADM

## 2019-10-07 RX ORDER — LIDOCAINE HYDROCHLORIDE 20 MG/ML
INJECTION, SOLUTION INFILTRATION; PERINEURAL PRN
Status: DISCONTINUED | OUTPATIENT
Start: 2019-10-07 | End: 2019-10-07

## 2019-10-07 RX ORDER — FAMOTIDINE 20 MG/1
20 TABLET, FILM COATED ORAL 2 TIMES DAILY PRN
Status: DISCONTINUED | OUTPATIENT
Start: 2019-10-07 | End: 2019-10-09

## 2019-10-07 RX ORDER — DEXAMETHASONE SODIUM PHOSPHATE 4 MG/ML
INJECTION, SOLUTION INTRA-ARTICULAR; INTRALESIONAL; INTRAMUSCULAR; INTRAVENOUS; SOFT TISSUE PRN
Status: DISCONTINUED | OUTPATIENT
Start: 2019-10-07 | End: 2019-10-07

## 2019-10-07 RX ORDER — SODIUM CHLORIDE, SODIUM LACTATE, POTASSIUM CHLORIDE, CALCIUM CHLORIDE 600; 310; 30; 20 MG/100ML; MG/100ML; MG/100ML; MG/100ML
INJECTION, SOLUTION INTRAVENOUS CONTINUOUS
Status: DISCONTINUED | OUTPATIENT
Start: 2019-10-07 | End: 2019-10-08

## 2019-10-07 RX ORDER — SODIUM CHLORIDE, SODIUM LACTATE, POTASSIUM CHLORIDE, CALCIUM CHLORIDE 600; 310; 30; 20 MG/100ML; MG/100ML; MG/100ML; MG/100ML
INJECTION, SOLUTION INTRAVENOUS CONTINUOUS
Status: DISCONTINUED | OUTPATIENT
Start: 2019-10-07 | End: 2019-10-07 | Stop reason: HOSPADM

## 2019-10-07 RX ORDER — LABETALOL 20 MG/4 ML (5 MG/ML) INTRAVENOUS SYRINGE
10
Status: DISCONTINUED | OUTPATIENT
Start: 2019-10-07 | End: 2019-10-07 | Stop reason: HOSPADM

## 2019-10-07 RX ORDER — ONDANSETRON 4 MG/1
4 TABLET, ORALLY DISINTEGRATING ORAL EVERY 30 MIN PRN
Status: DISCONTINUED | OUTPATIENT
Start: 2019-10-07 | End: 2019-10-07 | Stop reason: HOSPADM

## 2019-10-07 RX ORDER — CLINDAMYCIN PHOSPHATE 900 MG/50ML
900 INJECTION, SOLUTION INTRAVENOUS SEE ADMIN INSTRUCTIONS
Status: DISCONTINUED | OUTPATIENT
Start: 2019-10-07 | End: 2019-10-07 | Stop reason: HOSPADM

## 2019-10-07 RX ORDER — NALOXONE HYDROCHLORIDE 0.4 MG/ML
.1-.4 INJECTION, SOLUTION INTRAMUSCULAR; INTRAVENOUS; SUBCUTANEOUS
Status: ACTIVE | OUTPATIENT
Start: 2019-10-07 | End: 2019-10-08

## 2019-10-07 RX ORDER — LIDOCAINE 40 MG/G
CREAM TOPICAL
Status: DISCONTINUED | OUTPATIENT
Start: 2019-10-07 | End: 2019-10-07 | Stop reason: HOSPADM

## 2019-10-07 RX ORDER — AMOXICILLIN 250 MG
2 CAPSULE ORAL 2 TIMES DAILY PRN
Status: DISCONTINUED | OUTPATIENT
Start: 2019-10-07 | End: 2019-10-10 | Stop reason: HOSPADM

## 2019-10-07 RX ORDER — PHENAZOPYRIDINE HYDROCHLORIDE 200 MG/1
200 TABLET, FILM COATED ORAL ONCE
Status: DISCONTINUED | OUTPATIENT
Start: 2019-10-07 | End: 2019-10-07 | Stop reason: HOSPADM

## 2019-10-07 RX ORDER — HYDROMORPHONE HYDROCHLORIDE 1 MG/ML
.3-.5 INJECTION, SOLUTION INTRAMUSCULAR; INTRAVENOUS; SUBCUTANEOUS EVERY 5 MIN PRN
Status: DISCONTINUED | OUTPATIENT
Start: 2019-10-07 | End: 2019-10-07 | Stop reason: HOSPADM

## 2019-10-07 RX ORDER — FENTANYL CITRATE 50 UG/ML
25-50 INJECTION, SOLUTION INTRAMUSCULAR; INTRAVENOUS
Status: DISCONTINUED | OUTPATIENT
Start: 2019-10-07 | End: 2019-10-07 | Stop reason: HOSPADM

## 2019-10-07 RX ORDER — ACETAMINOPHEN 325 MG/1
650 TABLET ORAL EVERY 6 HOURS
Status: DISCONTINUED | OUTPATIENT
Start: 2019-10-07 | End: 2019-10-10 | Stop reason: HOSPADM

## 2019-10-07 RX ORDER — IBUPROFEN 600 MG/1
600 TABLET, FILM COATED ORAL EVERY 6 HOURS
Status: DISCONTINUED | OUTPATIENT
Start: 2019-10-07 | End: 2019-10-10 | Stop reason: HOSPADM

## 2019-10-07 RX ORDER — ONDANSETRON 4 MG/1
4 TABLET, ORALLY DISINTEGRATING ORAL EVERY 8 HOURS PRN
Status: DISCONTINUED | OUTPATIENT
Start: 2019-10-08 | End: 2019-10-10 | Stop reason: HOSPADM

## 2019-10-07 RX ORDER — BISACODYL 10 MG
10 SUPPOSITORY, RECTAL RECTAL DAILY
Status: DISCONTINUED | OUTPATIENT
Start: 2019-10-07 | End: 2019-10-10 | Stop reason: HOSPADM

## 2019-10-07 RX ORDER — SIMETHICONE 80 MG
80 TABLET,CHEWABLE ORAL 4 TIMES DAILY PRN
Status: DISCONTINUED | OUTPATIENT
Start: 2019-10-07 | End: 2019-10-10 | Stop reason: HOSPADM

## 2019-10-07 RX ORDER — HYDROMORPHONE HCL/0.9% NACL/PF 0.2MG/0.2
0.2 SYRINGE (ML) INTRAVENOUS
Status: DISCONTINUED | OUTPATIENT
Start: 2019-10-07 | End: 2019-10-10

## 2019-10-07 RX ORDER — CLINDAMYCIN PHOSPHATE 900 MG/50ML
900 INJECTION, SOLUTION INTRAVENOUS
Status: DISCONTINUED | OUTPATIENT
Start: 2019-10-07 | End: 2019-10-07 | Stop reason: HOSPADM

## 2019-10-07 RX ORDER — GENTAMICIN SULFATE 100 MG/100ML
1.7 INJECTION, SOLUTION INTRAVENOUS SEE ADMIN INSTRUCTIONS
Status: DISCONTINUED | OUTPATIENT
Start: 2019-10-07 | End: 2019-10-07 | Stop reason: HOSPADM

## 2019-10-07 RX ORDER — OXYCODONE HYDROCHLORIDE 5 MG/1
5-10 TABLET ORAL
Status: DISCONTINUED | OUTPATIENT
Start: 2019-10-07 | End: 2019-10-10 | Stop reason: HOSPADM

## 2019-10-07 RX ORDER — ONDANSETRON 4 MG/1
4 TABLET, ORALLY DISINTEGRATING ORAL EVERY 8 HOURS
Status: COMPLETED | OUTPATIENT
Start: 2019-10-07 | End: 2019-10-08

## 2019-10-07 RX ORDER — NALOXONE HYDROCHLORIDE 0.4 MG/ML
.1-.4 INJECTION, SOLUTION INTRAMUSCULAR; INTRAVENOUS; SUBCUTANEOUS
Status: DISCONTINUED | OUTPATIENT
Start: 2019-10-07 | End: 2019-10-10 | Stop reason: HOSPADM

## 2019-10-07 RX ORDER — ONDANSETRON 2 MG/ML
INJECTION INTRAMUSCULAR; INTRAVENOUS PRN
Status: DISCONTINUED | OUTPATIENT
Start: 2019-10-07 | End: 2019-10-07

## 2019-10-07 RX ORDER — HEPARIN SODIUM 5000 [USP'U]/.5ML
INJECTION, SOLUTION INTRAVENOUS; SUBCUTANEOUS PRN
Status: DISCONTINUED | OUTPATIENT
Start: 2019-10-07 | End: 2019-10-07

## 2019-10-07 RX ORDER — PROPOFOL 10 MG/ML
INJECTION, EMULSION INTRAVENOUS PRN
Status: DISCONTINUED | OUTPATIENT
Start: 2019-10-07 | End: 2019-10-07

## 2019-10-07 RX ORDER — ONDANSETRON 2 MG/ML
4 INJECTION INTRAMUSCULAR; INTRAVENOUS EVERY 30 MIN PRN
Status: DISCONTINUED | OUTPATIENT
Start: 2019-10-07 | End: 2019-10-07 | Stop reason: HOSPADM

## 2019-10-07 RX ORDER — FENTANYL CITRATE 50 UG/ML
INJECTION, SOLUTION INTRAMUSCULAR; INTRAVENOUS PRN
Status: DISCONTINUED | OUTPATIENT
Start: 2019-10-07 | End: 2019-10-07

## 2019-10-07 RX ADMIN — SODIUM CHLORIDE, POTASSIUM CHLORIDE, SODIUM LACTATE AND CALCIUM CHLORIDE: 600; 310; 30; 20 INJECTION, SOLUTION INTRAVENOUS at 13:24

## 2019-10-07 RX ADMIN — HYDROMORPHONE HYDROCHLORIDE 0.5 MG: 1 INJECTION, SOLUTION INTRAMUSCULAR; INTRAVENOUS; SUBCUTANEOUS at 12:36

## 2019-10-07 RX ADMIN — ROCURONIUM BROMIDE 10 MG: 10 INJECTION INTRAVENOUS at 12:15

## 2019-10-07 RX ADMIN — SODIUM CHLORIDE, POTASSIUM CHLORIDE, SODIUM LACTATE AND CALCIUM CHLORIDE: 600; 310; 30; 20 INJECTION, SOLUTION INTRAVENOUS at 15:45

## 2019-10-07 RX ADMIN — FENTANYL CITRATE 50 MCG: 50 INJECTION, SOLUTION INTRAMUSCULAR; INTRAVENOUS at 12:13

## 2019-10-07 RX ADMIN — ONDANSETRON HYDROCHLORIDE 4 MG: 2 INJECTION, SOLUTION INTRAMUSCULAR; INTRAVENOUS at 14:57

## 2019-10-07 RX ADMIN — SODIUM CHLORIDE, POTASSIUM CHLORIDE, SODIUM LACTATE AND CALCIUM CHLORIDE: 600; 310; 30; 20 INJECTION, SOLUTION INTRAVENOUS at 11:13

## 2019-10-07 RX ADMIN — PROPOFOL 150 MG: 10 INJECTION, EMULSION INTRAVENOUS at 11:22

## 2019-10-07 RX ADMIN — MAGNESIUM HYDROXIDE 15 ML: 400 SUSPENSION ORAL at 17:41

## 2019-10-07 RX ADMIN — FENTANYL CITRATE 25 MCG: 50 INJECTION INTRAMUSCULAR; INTRAVENOUS at 14:59

## 2019-10-07 RX ADMIN — LIDOCAINE HYDROCHLORIDE 100 MG: 20 INJECTION, SOLUTION INFILTRATION; PERINEURAL at 11:22

## 2019-10-07 RX ADMIN — PHENYLEPHRINE HYDROCHLORIDE 100 MCG: 10 INJECTION INTRAVENOUS at 13:24

## 2019-10-07 RX ADMIN — GENTAMICIN SULFATE 120 MG: 40 INJECTION, SOLUTION INTRAMUSCULAR; INTRAVENOUS at 11:35

## 2019-10-07 RX ADMIN — FENTANYL CITRATE 25 MCG: 50 INJECTION INTRAMUSCULAR; INTRAVENOUS at 14:53

## 2019-10-07 RX ADMIN — DEXAMETHASONE SODIUM PHOSPHATE 4 MG: 4 INJECTION, SOLUTION INTRA-ARTICULAR; INTRALESIONAL; INTRAMUSCULAR; INTRAVENOUS; SOFT TISSUE at 11:30

## 2019-10-07 RX ADMIN — IBUPROFEN 600 MG: 600 TABLET, FILM COATED ORAL at 17:40

## 2019-10-07 RX ADMIN — ONDANSETRON 4 MG: 2 INJECTION INTRAMUSCULAR; INTRAVENOUS at 13:49

## 2019-10-07 RX ADMIN — ONDANSETRON 4 MG: 4 TABLET, ORALLY DISINTEGRATING ORAL at 17:40

## 2019-10-07 RX ADMIN — MIDAZOLAM 1 MG: 1 INJECTION INTRAMUSCULAR; INTRAVENOUS at 11:14

## 2019-10-07 RX ADMIN — SENNOSIDES AND DOCUSATE SODIUM 1 TABLET: 8.6; 5 TABLET ORAL at 18:59

## 2019-10-07 RX ADMIN — Medication 5000 UNITS: at 11:51

## 2019-10-07 RX ADMIN — SUGAMMADEX 200 MG: 100 INJECTION, SOLUTION INTRAVENOUS at 14:03

## 2019-10-07 RX ADMIN — ESMOLOL HYDROCHLORIDE 10 MG: 10 INJECTION, SOLUTION INTRAVENOUS at 12:57

## 2019-10-07 RX ADMIN — Medication 0.2 MG: at 18:59

## 2019-10-07 RX ADMIN — FENTANYL CITRATE 250 MCG: 50 INJECTION, SOLUTION INTRAMUSCULAR; INTRAVENOUS at 11:22

## 2019-10-07 RX ADMIN — ACETAMINOPHEN 650 MG: 325 TABLET, FILM COATED ORAL at 22:36

## 2019-10-07 RX ADMIN — ACETAMINOPHEN 650 MG: 325 TABLET, FILM COATED ORAL at 16:01

## 2019-10-07 RX ADMIN — HYDROMORPHONE HYDROCHLORIDE 0.5 MG: 1 INJECTION, SOLUTION INTRAMUSCULAR; INTRAVENOUS; SUBCUTANEOUS at 13:10

## 2019-10-07 RX ADMIN — ROCURONIUM BROMIDE 50 MG: 10 INJECTION INTRAVENOUS at 11:22

## 2019-10-07 RX ADMIN — ROCURONIUM BROMIDE 20 MG: 10 INJECTION INTRAVENOUS at 12:59

## 2019-10-07 RX ADMIN — PHENYLEPHRINE HYDROCHLORIDE 100 MCG: 10 INJECTION INTRAVENOUS at 13:28

## 2019-10-07 RX ADMIN — OXYCODONE HYDROCHLORIDE 5 MG: 5 TABLET ORAL at 22:36

## 2019-10-07 RX ADMIN — FENTANYL CITRATE 100 MCG: 50 INJECTION, SOLUTION INTRAMUSCULAR; INTRAVENOUS at 12:46

## 2019-10-07 RX ADMIN — SUGAMMADEX 100 MG: 100 INJECTION, SOLUTION INTRAVENOUS at 14:13

## 2019-10-07 RX ADMIN — FENTANYL CITRATE 25 MCG: 50 INJECTION INTRAMUSCULAR; INTRAVENOUS at 15:07

## 2019-10-07 RX ADMIN — OXYCODONE HYDROCHLORIDE 5 MG: 5 TABLET ORAL at 17:40

## 2019-10-07 RX ADMIN — CLINDAMYCIN PHOSPHATE 900 MG: 18 INJECTION, SOLUTION INTRAVENOUS at 11:35

## 2019-10-07 ASSESSMENT — ACTIVITIES OF DAILY LIVING (ADL)
COGNITION: 0 - NO COGNITION ISSUES REPORTED
FALL_HISTORY_WITHIN_LAST_SIX_MONTHS: NO
DRESS: 0-->INDEPENDENT
TOILETING: 0-->INDEPENDENT
RETIRED_EATING: 0-->INDEPENDENT
RETIRED_COMMUNICATION: 0-->UNDERSTANDS/COMMUNICATES WITHOUT DIFFICULTY
BATHING: 0-->INDEPENDENT
SWALLOWING: 0-->SWALLOWS FOODS/LIQUIDS WITHOUT DIFFICULTY
TRANSFERRING: 0-->INDEPENDENT
ADLS_ACUITY_SCORE: 11
AMBULATION: 0-->INDEPENDENT

## 2019-10-07 ASSESSMENT — PAIN DESCRIPTION - DESCRIPTORS
DESCRIPTORS: ACHING;SORE;DISCOMFORT
DESCRIPTORS: ACHING;SORE

## 2019-10-07 ASSESSMENT — MIFFLIN-ST. JEOR: SCORE: 1099.5

## 2019-10-07 NOTE — ANESTHESIA POSTPROCEDURE EVALUATION
Anesthesia POST Procedure Evaluation    Patient: Gerardo Valenzuela   MRN:     [id number] Gender:   female   Age:    59 year old :      1960        Preoperative Diagnosis: Pelvic mass in female [R19.00]   Procedure(s):  Diagnostic laparoscopy, exploratory laparotomy, Bilateral SALPINGO-OOPHORECTOMY, lysis of adhesions   Postop Comments: No value filed.       Anesthesia Type:  Not documented  General    Reportable Event: NO     PAIN: Uncomplicated   Sign Out status: Comfortable, Well controlled pain     PONV: No PONV   Sign Out status:  No Nausea or Vomiting     Neuro/Psych: Uneventful perioperative course   Sign Out Status: Preoperative baseline; Age appropriate mentation     Airway/Resp.: Uneventful perioperative course   Sign Out Status: Non labored breathing, age appropriate RR; Resp. Status within EXPECTED Parameters     CV: Uneventful perioperative course   Sign Out status: Appropriate BP and perfusion indices; Appropriate HR/Rhythm     Disposition:   Sign Out in:  PACU  Disposition:  Floor  Recovery Course: Uneventful  Follow-Up: Not required           Last Anesthesia Record Vitals:  CRNA VITALS  10/7/2019 1404 - 10/7/2019 1504      10/7/2019             EKG:  Sinus rhythm          Last PACU Vitals:  Vitals Value Taken Time   /83 10/7/2019  3:00 PM   Temp 36.9  C (98.42  F) 10/7/2019  3:03 PM   Pulse 98 10/7/2019  3:00 PM   Resp 8 10/7/2019  2:36 PM   SpO2 97 % 10/7/2019  3:03 PM   Temp src     NIBP     Pulse     SpO2     Resp     Temp     Ht Rate     Temp 2     Vitals shown include unvalidated device data.      Electronically Signed By: Kranthi Villalobos MD, 2019, 3:04 PM

## 2019-10-07 NOTE — LETTER
Transition Communication Hand-off for Care Transitions to Next Level of Care Provider    Name: Gerardo Valenzuela  : 1960  MRN #: [id number]  Primary Care Provider: Bibiana Najera     Primary Clinic: [address] [place] [address] [address] [facility] [address]  [facility] [address]     Reason for Hospitalization:  Pelvic mass in female [R19.00]  Admit Date/Time: 10/7/2019  9:09 AM  Discharge Date: 10/10/19  Payor Source: Payor: PREFERREDONE / Plan: PREFERREDONE HMO / Product Type: PPO /     Preoperative diagnosis: Complex Pelvic mass  Postoperative diagnosis: Fibrothecoma of ovary  Procedures:   Diagnostic Laparoscopy converted to exploratory laparotomy, lysis of adhesions,  bilateral salpingo-oophorectomy    Discharge Plan: home    Follow-up plan:    Future Appointments   Date Time Provider Department Center   10/30/2019  2:00 PM Prashant Sawyer MD [facility]   2019  1:00 PM [facility]               Key Recommendations:  See AVS    Yue Johnston RN    AVS/Discharge Summary is the source of truth; this is a helpful guide for improved communication of patient story

## 2019-10-07 NOTE — PROGRESS NOTES
Follow Up Notes on Referred Patient    Date: 10/7/2019       Dr. Prashant Sawyer MD  [address] [place] [address]       RE: Gerardo Valenzuela  : 1960  JOANA: 10/4/2019    Dear Dr. Prashant Sawyer:    Gerardo Valenzuela is a 59 year old woman with a diagnosis of complex pelvic mass.     Patient presents today for followup.  No new symptoms since the last time I have seen her.           Past Medical History:    Past Medical History:   Diagnosis Date     GERD (gastroesophageal reflux disease)      Mixed hyperlipidemia 2018     Pelvic mass in female 10/1/2019    Added automatically from request for surgery [id number]     Prediabetes 2018         Past Surgical History:    Past Surgical History:   Procedure Laterality Date     HEMORRHOIDECTOMY      in [place]         Health Maintenance Due   Topic Date Due     ADVANCE CARE PLANNING  1960     COLONOSCOPY  1969     HIV SCREENING  1974     DTAP/TDAP/TD IMMUNIZATION (1 - Tdap) 1984     ZOSTER IMMUNIZATION (1 of 2) 2009     PREVENTIVE CARE VISIT  2018     PHQ-2  2019     INFLUENZA VACCINE (1) 2019       Current Medications:     No current outpatient medications on file.         Allergies:        Allergies   Allergen Reactions     Penicillin [Penicillins]         Social History:     Social History     Tobacco Use     Smoking status: Never Smoker     Smokeless tobacco: Never Used   Substance Use Topics     Alcohol use: No       History   Drug Use No         Family History:     Family History   Problem Relation Age of Onset     Cerebrovascular Disease Mother         CVA         Physical Exam:     /78   Pulse 80   Temp 98  F (36.7  C) (Oral)   Resp 16   Ht 1.524 m (5')   Wt 60.8 kg (134 lb)   LMP  (LMP Unknown)   SpO2 97%   Breastfeeding? No   BMI 26.17 kg/m    Body mass index is 26.17 kg/m .    General Appearance: healthy and alert, no distress    Neurological: normal  gait, no gross defects     Psychiatric: appropriate mood and affect                                   Assessment:    Gerardo Valenzuela is a 59 year old woman with a diagnosis of complex pelvic mass.     A total of 25 minutes was spent with the patient, 20 minutes of which were spent in counseling the patient and/or treatment planning.      1.  Complex pelvic mass.   2.  Normal .        Discussed with the patient as well as with the family we will proceed with a laparoscopic bilateral salpingo-oophorectomy.  If we acquire cancer and cannot proceed minimally invasively, we would proceed with an exploratory laparotomy, possible hysterectomy.  If we do acquire a cancer, possible cancer staging versus debulking.  She will be seen by my colleagues in Anesthesia for preoperative optimization.  We plan to do this next Monday.  Patient agrees with the plan.  She is very appreciative of her care.  All questions were answered.       Aly Vinson MD, MS    Department of Obstetrics and Gynecology   Division of Gynecologic Oncology   [facility]  Phone: [phone]        CC  Patient Care Team:  Bibiana Najera APRN CNP as PCP - General (Nurse Practitioner - Family)  Bibiana Najera APRN CNP as Assigned PCP  Krystyna Perera MD as Referring Physician (OB/Gyn)  Aly Vinson MD as MD (Obstetrics & Gynecology, Maternal & Fetal Medicine)  ALY VINSON

## 2019-10-07 NOTE — ANESTHESIA CARE TRANSFER NOTE
Patient: Gerardo Valenzuela    Procedure(s):  Diagnostic laparoscopy, exploratory laparotomy, Bilateral SALPINGO-OOPHORECTOMY, lysis of adhesions    Diagnosis: Pelvic mass in female [R19.00]  Diagnosis Additional Information: No value filed.    Anesthesia Type:   General     Note:  Airway :Nasal Cannula  Patient transferred to:PACU  Handoff Report: Identifed the Patient, Identified the Reponsible Provider, Reviewed the pertinent medical history, Discussed the surgical course, Reviewed Intra-OP anesthesia mangement and issues during anesthesia, Set expectations for post-procedure period and Allowed opportunity for questions and acknowledgement of understanding      Vitals: (Last set prior to Anesthesia Care Transfer)    CRNA VITALS  10/7/2019 1404 - 10/7/2019 1437      10/7/2019                              Electronically Signed By: IVONNE Macario CRNA  October 7, 2019  2:37 PM

## 2019-10-07 NOTE — OP NOTE
Operative Note     Date of Procedure: 10/7/2019    Preoperative diagnosis: Complex Pelvic mass    Postoperative diagnosis: Fibrothecoma of ovary    Procedures:   Diagnostic Laparoscopy converted to exploratory laparotomy, lysis of adhesions,  bilateral salpingo-oophorectomy     Surgeon: Prashant Sawyer MD    Assistants:   NICOL Verma MD    Anesthesia: GETA    Estimated blood loss: 100 ml  IV Fluids: 1600 ml crystalloid  Drains:  Gary to dependent drainage productive of 150 ml clear  urine at the end of the case.     Indication: Gerardo Valenzuela  is a 59 year old female who initially presented to the ED with right sided pelvic pain and was found to have complex 11 cm pelvic mass On follow-up outpatient visit, management options for pelvic mass were discussed, and she desired to proceed with diagnostic laparoscopy, possible exploratory laparotomy, bilateral salpingo-oophorectomy, hysterectomy, debulking, possible cancer staging.  Risks, benefits and alternatives were reviewed. Written informed consent was signed.      Findings:   On laparoscopy, survey of abdomen demonstrated normal liver and diaphragm. Small bowel was noted to be adherent to lower anterior abdominal wall. Right ovarian mass was found to be hidden under the loops of small bowel; it appeared solid cystic, 9-10 cm, torsed 3 and half times. Left ovary and both fallopian tubes appeared normal. Uterus normal size, normal appearing. Cecum adherent to right IP ligament.  Small serosal tear noted in the cecum, oversewn. Appendix appeared normal. Normal upper abdominal survey.    Frozen of right ovarian mass returned as benign fibrothecoma.    Complications: None.     Condition: Stable to PACU.     Procedure:     After consent was verified, the patient was taken to the operating room where she underwent general endotracheal anesthesia. She was placed in dorsal lithotomy position, then prepped and draped in normal sterile fashion.  A time out was performed, and a Gary catheter placed without difficulty.     Next, we proceeded with laparoscopy. A Raygoza port was placed approximately 3 cm above the umbilicus using the open technique. The skin was incised, and the incision was carried down to the level of the fascia with the cautery. The fascia was grasped and divided sharply.  0-Vicryl stay sutures were placed on the fascia. The peritoneum was then identified, grasped, and divided sharply to enter the abdominal cavity.  The port was placed, and after placing the patient in steep Trendelenburg position, the camera was used to survey the abdomen with the findings above. Next, we placed two additional 5 mm ports on each side both in the right and left lower quadrant. Pelvic washings were obtained, and we then attempted adhesiolysis prior to removal of the mass. Loops of small bowel were noted to be adherent to upper abdominal wall in the midline which were carefully taken down. Ovarian mass was found to be hidden under the loops of small bowel in the cul-de-sac; the mass had wrapped itself around the small bowel and was carefully  from the bowel using bowel grasper. Given its size and the gross findings, decision was made to convert to open procedure.     Midline laparotomy was performed from the pubic symphysis to the Raygoza port incision. Fascia was opened sharply, and the abdomen was explored with the findings as described above. The liver and bilateral hemidiaphragms were palpably normal. The Book Jeff retractor was placed after packing the bowel. First adhesions of the right ovarian mass to the cecum and terminal part of the ileum were carefully taken down. Right ovary along with right fallopian tube were found to be torsed three and half times which were untwisted. Next, the right retroperitoneal space was opened, and the ureter identified and seen to vermiculate. The right IP ligament was skeletonized, clamped, cut and ligated  with two 0-Vicryl ties. Next the utero-ovarian ligament was similarly transected, and pedicle tied off. The right ovarian mass and fallopian tube was sent to pathology for frozen. Next attention was directed to the left adnexa. The left retroperitoneal space was opened, and ureter identified. In similar fashion to the right, the left IP ligament was clamped, cut and ligated with 0-Vicryl ties, followed by the utero-ovarian ligament. The left fallopian tube and ovary was sent to pathology.     The bowel was unpacked and run from the ileocecal valve to the ligament of Treitz. The ascending and descending colon were mobilized. A small less than a centimeter serosal tear was noted in the cecum which was repaired with 2-0 silk suture. Meanwhile, frozen section of the mass returned as benign. Hence, we proceed with closing the abdomen. The abdomen and pelvis were copiously irrigated. Hemostasis was noted around the pedicles and dissection sites.     Next, fascia was closed with looped 0 PDS starting at each extent of the incision to meet approximately two-thirds of the way up. The subcutaneous tissues were irrigated with warm saline and reapproximated with an interrupted sutures of 2-0 Vicryl. Skin was closed with a running subcuticular stitch of 3-0 Monocryl and was covered with steri-strips and a sterile dressing. Two 5 mm laparoscopic ports were also closed with 3-0 monocryl and covered with sterile dressing. The Gary catheter was left in place.    The patient tolerated the procedure well and was transferred to PACU extubated and in stable condition. All sponge, needle, and instrument counts were correct x2.     Prashant Sawyer MD, MS    Department of Obstetrics and Gynecology   Division of Gynecologic Oncology   [facility]  Phone: [phone]

## 2019-10-07 NOTE — BRIEF OP NOTE
[facility], [place]    Brief Operative Note    Pre-operative diagnosis: Pelvic mass in female [R19.00]  Post-operative diagnosis * No post-op diagnosis entered *  Procedure: Procedure(s):  Diagnostic laparoscopy, exploratory laparotomy, Bilateral SALPINGO-OOPHORECTOMY, lysis of adhesions  Surgeon: Surgeon(s) and Role:     * Prashant Sawyer MD - Primary     * Shoaib Rabago MD - Resident - Assisting     * Marah Leblanc MD - Fellow - Assisting  Anesthesia: General   Estimated blood loss: 100cc  IV fluids: 1600cc  Urine output: 150 ml      Specimens:   ID Type Source Tests Collected by Time Destination   1 : pelvic washings Washings Pelvis CYTOLOGY NON GYN Prashant Sawyer MD 10/7/2019 12:27 PM    A :  Tissue Fallopian Tube and Ovary, Right SURGICAL PATHOLOGY EXAM Prashant Sawyer MD 10/7/2019  1:04 PM    B :  Tissue Fallopian Tube and Ovary, Left SURGICAL PATHOLOGY EXAM Prashant Sawyer MD 10/7/2019  1:15 PM      Findings: Small bowel adherent to lower anterior abdominal wall. Right ovarian mass: solid cystic, 9-10 cm, pedicle torsed 3 and half turns. Left ovary and both fallopian tube normal appearing. Uterus normal size, normal appearing. Cecum adherent to right IP ligament. Right ovarian mass wrapped by small bowel. Small serosal tear noted in the cecum, oversewn. Appendix appeared normal. Normal upper abdominal survey. Frozen section : benign.       Complications: None  Implants:  * No implants in log *

## 2019-10-08 LAB
ANION GAP SERPL CALCULATED.3IONS-SCNC: 6 MMOL/L (ref 3–14)
BUN SERPL-MCNC: 7 MG/DL (ref 7–30)
CALCIUM SERPL-MCNC: 8.8 MG/DL (ref 8.5–10.1)
CHLORIDE SERPL-SCNC: 103 MMOL/L (ref 94–109)
CO2 SERPL-SCNC: 29 MMOL/L (ref 20–32)
COPATH REPORT: NORMAL
CREAT SERPL-MCNC: 0.49 MG/DL (ref 0.52–1.04)
ERYTHROCYTE [DISTWIDTH] IN BLOOD BY AUTOMATED COUNT: 12.6 % (ref 10–15)
GFR SERPL CREATININE-BSD FRML MDRD: >90 ML/MIN/{1.73_M2}
GLUCOSE BLDC GLUCOMTR-MCNC: 128 MG/DL (ref 70–99)
GLUCOSE SERPL-MCNC: 130 MG/DL (ref 70–99)
HCT VFR BLD AUTO: 30.9 % (ref 35–47)
HGB BLD-MCNC: 10.3 G/DL (ref 11.7–15.7)
HGB BLD-MCNC: 9.8 G/DL (ref 11.7–15.7)
MCH RBC QN AUTO: 29.2 PG (ref 26.5–33)
MCHC RBC AUTO-ENTMCNC: 31.7 G/DL (ref 31.5–36.5)
MCV RBC AUTO: 92 FL (ref 78–100)
PLATELET # BLD AUTO: 371 10E9/L (ref 150–450)
POTASSIUM SERPL-SCNC: 4.4 MMOL/L (ref 3.4–5.3)
RBC # BLD AUTO: 3.36 10E12/L (ref 3.8–5.2)
SODIUM SERPL-SCNC: 137 MMOL/L (ref 133–144)
WBC # BLD AUTO: 13.7 10E9/L (ref 4–11)

## 2019-10-08 PROCEDURE — 25000131 ZZH RX MED GY IP 250 OP 636 PS 637: Performed by: STUDENT IN AN ORGANIZED HEALTH CARE EDUCATION/TRAINING PROGRAM

## 2019-10-08 PROCEDURE — 36415 COLL VENOUS BLD VENIPUNCTURE: CPT | Performed by: STUDENT IN AN ORGANIZED HEALTH CARE EDUCATION/TRAINING PROGRAM

## 2019-10-08 PROCEDURE — 99024 POSTOP FOLLOW-UP VISIT: CPT | Performed by: OBSTETRICS & GYNECOLOGY

## 2019-10-08 PROCEDURE — 25000128 H RX IP 250 OP 636: Performed by: STUDENT IN AN ORGANIZED HEALTH CARE EDUCATION/TRAINING PROGRAM

## 2019-10-08 PROCEDURE — 80048 BASIC METABOLIC PNL TOTAL CA: CPT | Performed by: STUDENT IN AN ORGANIZED HEALTH CARE EDUCATION/TRAINING PROGRAM

## 2019-10-08 PROCEDURE — 00000146 ZZHCL STATISTIC GLUCOSE BY METER IP

## 2019-10-08 PROCEDURE — 85018 HEMOGLOBIN: CPT | Performed by: STUDENT IN AN ORGANIZED HEALTH CARE EDUCATION/TRAINING PROGRAM

## 2019-10-08 PROCEDURE — 25000132 ZZH RX MED GY IP 250 OP 250 PS 637: Performed by: STUDENT IN AN ORGANIZED HEALTH CARE EDUCATION/TRAINING PROGRAM

## 2019-10-08 PROCEDURE — 12000001 ZZH R&B MED SURG/OB UMMC

## 2019-10-08 PROCEDURE — 25800030 ZZH RX IP 258 OP 636: Performed by: STUDENT IN AN ORGANIZED HEALTH CARE EDUCATION/TRAINING PROGRAM

## 2019-10-08 PROCEDURE — 85027 COMPLETE CBC AUTOMATED: CPT | Performed by: STUDENT IN AN ORGANIZED HEALTH CARE EDUCATION/TRAINING PROGRAM

## 2019-10-08 RX ADMIN — ACETAMINOPHEN 650 MG: 325 TABLET, FILM COATED ORAL at 16:46

## 2019-10-08 RX ADMIN — IBUPROFEN 600 MG: 600 TABLET, FILM COATED ORAL at 00:53

## 2019-10-08 RX ADMIN — MAGNESIUM HYDROXIDE 15 ML: 400 SUSPENSION ORAL at 08:17

## 2019-10-08 RX ADMIN — IBUPROFEN 600 MG: 600 TABLET, FILM COATED ORAL at 18:10

## 2019-10-08 RX ADMIN — ACETAMINOPHEN 650 MG: 325 TABLET, FILM COATED ORAL at 09:50

## 2019-10-08 RX ADMIN — IBUPROFEN 600 MG: 600 TABLET, FILM COATED ORAL at 06:22

## 2019-10-08 RX ADMIN — ACETAMINOPHEN 650 MG: 325 TABLET, FILM COATED ORAL at 22:28

## 2019-10-08 RX ADMIN — ACETAMINOPHEN 650 MG: 325 TABLET, FILM COATED ORAL at 04:26

## 2019-10-08 RX ADMIN — ONDANSETRON 4 MG: 4 TABLET, ORALLY DISINTEGRATING ORAL at 00:53

## 2019-10-08 RX ADMIN — ONDANSETRON 4 MG: 4 TABLET, ORALLY DISINTEGRATING ORAL at 08:17

## 2019-10-08 RX ADMIN — SODIUM CHLORIDE, POTASSIUM CHLORIDE, SODIUM LACTATE AND CALCIUM CHLORIDE: 600; 310; 30; 20 INJECTION, SOLUTION INTRAVENOUS at 01:02

## 2019-10-08 RX ADMIN — Medication 0.2 MG: at 16:46

## 2019-10-08 RX ADMIN — OXYCODONE HYDROCHLORIDE 5 MG: 5 TABLET ORAL at 20:52

## 2019-10-08 RX ADMIN — ENOXAPARIN SODIUM 40 MG: 40 INJECTION SUBCUTANEOUS at 16:46

## 2019-10-08 RX ADMIN — IBUPROFEN 600 MG: 600 TABLET, FILM COATED ORAL at 11:56

## 2019-10-08 ASSESSMENT — ACTIVITIES OF DAILY LIVING (ADL)
ADLS_ACUITY_SCORE: 12
ADLS_ACUITY_SCORE: 12
ADLS_ACUITY_SCORE: 13
ADLS_ACUITY_SCORE: 11

## 2019-10-08 ASSESSMENT — PAIN DESCRIPTION - DESCRIPTORS
DESCRIPTORS: ACHING
DESCRIPTORS: ACHING
DESCRIPTORS: ACHING;SORE;CRAMPING

## 2019-10-08 NOTE — PLAN OF CARE
A&OX4. VSS on 1L of NC. POD 1 Diagnostic laparoscopy, exploratory laparotomy, Bilateral salpingo-oophorectomy,  lysis of adhesions (Per MD note). Pain controlled with scheduled tylenol and Ibuprofen during shift. On reg diet, denies N/V. Abdominal binder in place. Abdominal incision with marked drainage. Adequate output from burrows. MIVF discontinued this AM. Assist of 2. PIVX2. Daughter at bedside supportive with care. Continue to monitor and maintain pain control

## 2019-10-08 NOTE — PROGRESS NOTES
Gynecology Oncology Progress Note  10/9/19    Gerardo Valenzuela is a 59 year old POD#2 s/p diagnostic lsc converted to XL, BSO, TANIA for pelvic mass    Dz: pelvic mass, likely benign fibrothecoma on frozen    24 hour events: No acute events    Subjective: Patient is feeling okay this morning. She slept well and pain was well controlled. Denies any nausea, vomiting, fever or chills. She ambulated in the horan yesterday, and up in the room several times. She is voiding. She is not passing gas. Feeling like her stomach is full.     Objective:   Vitals:    10/08/19 0629 10/08/19 0900 10/08/19 1529 10/08/19 2209   BP: 92/64  99/46 92/51   BP Location: Right arm  Right arm Right arm   Pulse:       Resp: 16  16 16   Temp: 96.8  F (36  C)  98.6  F (37  C) 99.1  F (37.3  C)   TempSrc: Oral  Oral Oral   SpO2: 96% 98% 95% 95%   Weight:       Height:         General: alert and oriented, in NAD  CV: regular rate and rhythm, no murmurs  Resp: clear bilaterally  Abdomen: soft, appropriately tender, with some voluntary guarding, mildly distended  Incision: bandages removed over VML and bilateral port sites. Incisions appear well approximated, clean, dry, intact  Extremities: nontender, no edema, SCDs in place    I/O yesterday // since midnight  PO: 1080 ml // 120 ml  IV: 800 ml //   UOP: 4625 ml // x1    Labs/imaging:  No new labs    Assessment: 59 year old POD#2 s/p diagnostic lsc converted to XL, BSO, TANIA. Doing well post-op.    Active Problem list:  1. Pelvic mass  2. 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   - Postoperative visit scheduled with Dr. Sawyer on 10/30/19, will discuss if any further treatment indicated    # Postoperative state  FEN: regular diet.   Pain: Tylenol, ibuprofen, oxycodone PRN. IV dilaudid PRN.  Heme: anticipate acute blood loss anemia, asymptomatic.  GI: Bowel regimen. Encouraged ambulation.  : no issues    PPX: SCDs, IS, ppx  lovenox  Dispo: Pending post-op recovery    Shoaib Rabago MD, MPH  Gynecologic Oncology, PGY-2  October 9, 2019 , 5:48 AM    Pager [phone]    I have seen the patient on rounds. I have personally reviewed her vital signs and labs. Passing flatus this afternoon, likely discharge tomorrow.    I agree with the findings and plan as detailed in the note above.    Valentin Torrez MD    Gynecologic Oncology

## 2019-10-08 NOTE — PLAN OF CARE
Pt arrived from PACU around 1700. VSS on 1L. Using incentive spirometer. Ambulated to bed with 2 assist and gait belt upon arriving from PACU, c/o slight dizziness when sitting up. Denies nausea. Reg diet, tolerating sips of clears and crackers. IVF infusing into PIV @ 100 mL/hr. Pain managed with scheduled Tylenol/Ibuprofen and PRN oxycodone/IV dilaudid. Heat packs also helping to manage pain. Abdominal binder in place. Dressing over incisions intact with small amount of drainage. Adequate UOP from burrows. Continue with POC.

## 2019-10-08 NOTE — PROGRESS NOTES
Gynecology Oncology Progress Note  10/7/19     Gerardo Valenzuela is a 59 year old POD#1 s/p diagnostic lsc converted to XL, BSO, TANIA for pelvic mass     Dz: pelvic mass, likely benign fibrothecoma on frozen     Subjective: Patient is feeling okay. Having some pain but improved with oral medications. Ambulated within room x1 with some dizziness. Drinking tea without nausea or vomiting. Not much of an appetite right now. Burrows in place.      Objective:   Vitals:    10/07/19 1630 10/07/19 1655 10/07/19 1739 10/07/19 1903   BP:  124/70  127/75   BP Location:  Right arm  Right arm   Pulse:    79   Resp: 10 8 12 13   Temp:  97.4  F (36.3  C)  96.5  F (35.8  C)   TempSrc:  Oral  Oral   SpO2: 95% 100%  98%   Weight:       Height:       2L NC  General: alert and oriented, in NAD  CV: well-perfused  Resp: breathing comfortably on room air  Abdomen: soft, appropriately tender, non-distended  Incision: vertical midline incision covered with bandage, approximately 2/3 with shadowing.   Extremities: nontender, no edema, SCDs in place     I/O   PO: 240  IV: 1550  UOP: 225    Assessment: 59 year old POD#0 s/p diagnostic lsc converted to XL, BSO, TANIA. Doing well in immediate postoperative period.     Active Problem list:  1. Pelvic mass  2. Postoperative state     # Pelvic mass, likely benign fibrothecoma on frozen biopsy  - Final pathology pending.   - Postoperative visit scheduled with Dr. Sawyer on 10/30/19, will discuss if any further treatment indicated     # Postoperative state  - ADAT, regular diet. IVF 100ml/hr. Discontinue IVF once tolerating PO.  - For pain: tylenol, ibuprofen, oxycodone PRN. IV dilaudid PRN.  - Anticipate acute blood loss anemia, asymptomatic. AM CBC pending.  - Plan burrows removal POD#1 and TOV     PPX: SCDs, IS  Dispo: Pending post-op recovery    Florian Melgar MD  OB/GYN Resident, PGY-4  10/7/2019

## 2019-10-08 NOTE — PROGRESS NOTES
Gynecology Oncology Progress Note  10/8/19    Gerardo Valenzuela is a 59 year old POD#1 s/p diagnostic lsc converted to XL, BSO, TANIA for pelvic mass    Dz: pelvic mass, likely benign fibrothecoma on frozen    24 hour events: No acute events    Subjective: Patient is feeling okay this morning. She slept fairly well.  Pain is controlled with oral medications.  Has only had a few bites to eat, but no nausea or vomiting. She is hungry for breakfast this morning. No flatus, and no BM.  Burrows catheter in place.     Objective:   Vitals:    10/07/19 2055 10/07/19 2146 10/07/19 2234 10/08/19 0351   BP: 119/70  (P) 119/75 94/58   BP Location: Right arm  (P) Right arm Right arm   Pulse:       Resp: 11  (P) 18 11   Temp:   (P) 97.8  F (36.6  C) 97.9  F (36.6  C)   TempSrc:   (P) Axillary Oral   SpO2: 98% 99% (P) 99% 98%   Weight:       Height:         General: alert and oriented, in NAD  CV: regular rate and rhythm, no murmurs  Resp: clear bilaterally  Abdomen: soft, appropriately tender, with some voluntary guarding, mildly distended  Incision: bandages overlying VML and bilateral port sites. Midline incision with drainage marked.  Extremities: nontender, no edema, SCDs in place    I/O yesterday // since midnight  PO: 240 ml  IV: 2148 ml  UOP: 400 ml// 2125 ml    Labs/imaging:  BMP and CBC pending    Assessment: 59 year old POD#1 s/p diagnostic lsc converted to XL, BSO, TANIA. Doing well POD#1.    Active Problem list:  1. Pelvic mass  2. 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   - Postoperative visit scheduled with Dr. Sawyer on 10/30/19, will discuss if any further treatment indicated    # Postoperative state  FEN: ADAT, regular diet. IVF 100ml/hr. Will discontinue IVF when tolerating more PO.  Pain: Tylenol, ibuprofen, oxycodone PRN. IV dilaudid PRN.  Heme: anticipate acute blood loss anemia, asymptomatic. AM CBC pending.  : burrows removal this AM and  TOV    PPX: SCDs, IS. Plan to start ppx lovenox pending AM Hgb.  Dispo: Pending post-op recovery    Shoaib Rabago MD, MPH  Gynecologic Oncology, PGY-2  October 8, 2019 , 6:23 AM    Pager [phone]

## 2019-10-08 NOTE — DISCHARGE SUMMARY
Gynecologic Oncology Discharge Summary    Gerardo Valenzuela  [id number]    Admit Date: 10/7/2019  Discharge Date: 10/10/19  Admitting Provider: Valentin Torrez  Discharge Provider: Valentin Torrez    Admission Dx:   - Pelvic mass    Discharge Dx:  - Pelvic mass, on frozen likely benign fibrothecoma    Patient Active Problem List   Diagnosis     CARDIOVASCULAR SCREENING; LDL GOAL LESS THAN 160     Prediabetes     Vitamin D deficiency     Mixed hyperlipidemia     Overweight     Pelvic mass in female     Pelvic mass     Procedures: Diagnostic Laparoscopy converted to exploratory laparotomy, lysis of adhesions, bilateral salpingo-oophorectomy    Prior to Admission Medications:  Medications Prior to Admission   Medication Sig Dispense Refill Last Dose     famotidine (PEPCID) 20 MG tablet Take 1 tablet (20 mg) by mouth 2 times daily (Patient not taking: Reported on 9/18/2019) 90 tablet 3 More than a month at Unknown time     ondansetron (ZOFRAN) 4 MG tablet Take 1 tablet (4 mg) by mouth every 8 hours as needed for nausea (Patient not taking: Reported on 9/18/2019) 6 tablet 0 Unknown at Unknown time     vitamin D3 (CHOLECALCIFEROL) 2000 units (50 mcg) tablet Take 1 tablet by mouth daily   10/4/2019     [DISCONTINUED] ibuprofen (ADVIL/MOTRIN) 800 MG tablet Take 1 tablet (800 mg) by mouth every 8 hours as needed for moderate pain 30 tablet 0 9/30/2019     [DISCONTINUED] oxyCODONE-acetaminophen (PERCOCET) 5-325 MG tablet Take 1-2 tablets by mouth every 4 hours as needed for pain (Patient not taking: Reported on 10/4/2019) 12 tablet 0 More than a month at Unknown time     Discharge Medications:     Review of your medicines      START taking      Dose / Directions   acetaminophen 325 MG tablet  Commonly known as:  TYLENOL  Used for:  Pelvic mass in female      Dose:  650 mg  Take 2 tablets (650 mg) by mouth every 6 hours as needed for mild pain  Quantity:  24 tablet  Refills:  0     oxyCODONE 5 MG tablet  Commonly known as:   ROXICODONE  Used for:  Pelvic mass in female      Dose:  5 mg  Take 1 tablet (5 mg) by mouth every 6 hours as needed for pain  Quantity:  10 tablet  Refills:  0     SENNA-docusate sodium 8.6-50 MG tablet  Commonly known as:  SENNA S  Used for:  Pelvic mass in female      Dose:  1 tablet  Take 1 tablet by mouth 2 times daily as needed (constipation)  Quantity:  40 tablet  Refills:  0        CONTINUE these medicines which may have CHANGED, or have new prescriptions. If we are uncertain of the size of tablets/capsules you have at home, strength may be listed as something that might have changed.      Dose / Directions   ibuprofen 600 MG tablet  Commonly known as:  ADVIL/MOTRIN  This may have changed:      medication strength    how much to take    when to take this  Used for:  Pelvic mass in female      Dose:  600 mg  Take 1 tablet (600 mg) by mouth every 6 hours as needed for moderate pain  Quantity:  12 tablet  Refills:  0        CONTINUE these medicines which have NOT CHANGED      Dose / Directions   famotidine 20 MG tablet  Commonly known as:  PEPCID  Used for:  Dyspepsia      Dose:  20 mg  Take 1 tablet (20 mg) by mouth 2 times daily  Quantity:  90 tablet  Refills:  3     ondansetron 4 MG tablet  Commonly known as:  ZOFRAN      Dose:  4 mg  Take 1 tablet (4 mg) by mouth every 8 hours as needed for nausea  Quantity:  6 tablet  Refills:  0     vitamin D3 2000 units (50 mcg) tablet  Commonly known as:  CHOLECALCIFEROL      Dose:  1 tablet  Take 1 tablet by mouth daily  Refills:  0        STOP taking    oxyCODONE-acetaminophen 5-325 MG tablet  Commonly known as:  PERCOCET              Where to get your medicines      These medications were sent to [facility] - [place], [place] - [address]  [address] [address]    Phone:  [phone]     acetaminophen 325 MG tablet    ibuprofen 600 MG tablet    oxyCODONE 5 MG tablet    SENNA-docusate sodium 8.6-50 MG tablet        Consultations:  None    Brief History of Illness:  Gerardo Valenzuela  is a 59 year old female who initially presented to the ED with right sided pelvic pain and was found to have complex 11 cm pelvic mass On follow-up outpatient visit, management options for pelvic mass were discussed, and she desired to proceed with diagnostic laparoscopy, possible exploratory laparotomy, bilateral salpingo-oophorectomy, hysterectomy, debulking, possible cancer staging.  Risks, benefits and alternatives were reviewed. Written informed consent was signed.     Hospital Course:  Dz:   - Preoperative diagnosis was pelvic mass.  Frozen section at the time of the surgery showed likely benign fibothecoma.  Final pathology is pending at the time of discharge.  She will follow-up postoperatively for a care plan.  FEN:   - She was maintained on IVF until POD#1, when her diet was slowly advanced.  By discharge, she was tolerating a regular diet without nausea and vomiting and able to maintain her hydration without IVF supplementation.  Pain:   - Her pain was initially controlled on IV and oral pain medications.  Once tolerating PO pain meds, she was transitioned to a PO pain regimen.  Her pain was well controlled on this and she was discharged home with these medications.  CV:   - She has no history of CV issues.  Her vital signs were stable while in house and she had no acute CV issues.  PULM:   - She has no history of pulmonary issues.  She was initially given O2 supplementation in to maintain her O2 sats in the immediate postop period and was transitioned off of this without difficulty.  By discharge, her O2 sats were greater than 90% on RA.  She was encouraged to use her bedside IS while in house.  She had no acute pulmonary issues while in house.  HEME:   - Her preoperative Hgb was 14.2 on 9/13.  Her hgb dropped to 9.8 postoperatively and was stable at 10.3 at the time of discharge.  She had no other acute heme issues while in  house.  GI:   - She was made NPO prior to the procedure.  On POD#0, her diet was advanced slowly as tolerated.  At the time of discharge, she was tolerating a regular diet without nausea and vomiting.  She will be discharged with a bowel regimen to prevent constipation in the postoperative period.  Patient had flatus and BM prior to discharge. She had no acute GI issues while in house.  :    - A burrows catheter was placed at the time of the surgery.  Once ambulating unassisted, the burrows catheter was removed.  Prior to discharge, the patient was voiding spontaneously without difficulty.  She had no acute  issues while in house.  ID:   - The patient was afebrile during her hospitalization.  She received standard preoperative antibiotics without incident.    ENDO:   - No issues  PSYCH/NEURO:   - No issues  PPX:    - She was given SCDs, IS, and PPI during her hospital course.  She tolerated these prophylactic interventions without incident.  They were discontinued at the time of her discharge.    Discharge Instructions and Follow up:  Ms. Gerardo Valenzuela was discharged from the hospital with the following instructions:    Discharge Diet: Regular  Discharge Activity: Activity as tolerated  Discharge Follow up: with Dr. Sawyer on 10/30/19    Discharge Disposition:  Discharged to home    Discharge Staff: Valentin Hoffmann CNP  10/10/2019 10:08 AM

## 2019-10-09 PROCEDURE — 25000132 ZZH RX MED GY IP 250 OP 250 PS 637: Performed by: NURSE PRACTITIONER

## 2019-10-09 PROCEDURE — 99024 POSTOP FOLLOW-UP VISIT: CPT | Performed by: OBSTETRICS & GYNECOLOGY

## 2019-10-09 PROCEDURE — 12000001 ZZH R&B MED SURG/OB UMMC

## 2019-10-09 PROCEDURE — 25000132 ZZH RX MED GY IP 250 OP 250 PS 637: Performed by: STUDENT IN AN ORGANIZED HEALTH CARE EDUCATION/TRAINING PROGRAM

## 2019-10-09 PROCEDURE — 25000128 H RX IP 250 OP 636: Performed by: STUDENT IN AN ORGANIZED HEALTH CARE EDUCATION/TRAINING PROGRAM

## 2019-10-09 RX ORDER — FAMOTIDINE 20 MG/1
20 TABLET, FILM COATED ORAL 2 TIMES DAILY
Status: DISCONTINUED | OUTPATIENT
Start: 2019-10-09 | End: 2019-10-10 | Stop reason: HOSPADM

## 2019-10-09 RX ADMIN — ACETAMINOPHEN 650 MG: 325 TABLET, FILM COATED ORAL at 03:47

## 2019-10-09 RX ADMIN — OXYCODONE HYDROCHLORIDE 5 MG: 5 TABLET ORAL at 03:47

## 2019-10-09 RX ADMIN — ACETAMINOPHEN 650 MG: 325 TABLET, FILM COATED ORAL at 15:32

## 2019-10-09 RX ADMIN — ACETAMINOPHEN 650 MG: 325 TABLET, FILM COATED ORAL at 21:25

## 2019-10-09 RX ADMIN — ENOXAPARIN SODIUM 40 MG: 40 INJECTION SUBCUTANEOUS at 15:08

## 2019-10-09 RX ADMIN — IBUPROFEN 600 MG: 600 TABLET, FILM COATED ORAL at 00:20

## 2019-10-09 RX ADMIN — BISACODYL 10 MG: 10 SUPPOSITORY RECTAL at 19:02

## 2019-10-09 RX ADMIN — FAMOTIDINE 20 MG: 20 TABLET ORAL at 09:01

## 2019-10-09 RX ADMIN — BISACODYL 10 MG: 10 SUPPOSITORY RECTAL at 09:06

## 2019-10-09 RX ADMIN — OXYCODONE HYDROCHLORIDE 5 MG: 5 TABLET ORAL at 09:01

## 2019-10-09 RX ADMIN — OXYCODONE HYDROCHLORIDE 5 MG: 5 TABLET ORAL at 15:32

## 2019-10-09 RX ADMIN — IBUPROFEN 600 MG: 600 TABLET, FILM COATED ORAL at 05:40

## 2019-10-09 RX ADMIN — IBUPROFEN 600 MG: 600 TABLET, FILM COATED ORAL at 12:41

## 2019-10-09 RX ADMIN — ACETAMINOPHEN 650 MG: 325 TABLET, FILM COATED ORAL at 10:39

## 2019-10-09 RX ADMIN — IBUPROFEN 600 MG: 600 TABLET, FILM COATED ORAL at 19:02

## 2019-10-09 RX ADMIN — FAMOTIDINE 20 MG: 20 TABLET ORAL at 19:02

## 2019-10-09 ASSESSMENT — ACTIVITIES OF DAILY LIVING (ADL)
ADLS_ACUITY_SCORE: 15

## 2019-10-09 ASSESSMENT — PAIN DESCRIPTION - DESCRIPTORS
DESCRIPTORS: ACHING;SORE
DESCRIPTORS: SORE
DESCRIPTORS: ACHING;CRAMPING;SORE
DESCRIPTORS: ACHING;SORE

## 2019-10-09 NOTE — PLAN OF CARE
[id number] Midline abdominal incision with dressing intact. Shadowing on dressing extends beyond markings. Hyperactive bowel sounds. No flatus or BM. Denies nausea. PIV saline locked. Abdominal pain controlled with heating pad, Tylenol, Ibuprofen, and 5 mg oxycodone x2. Voiding spontaneously adequate amounts. Up with assist x1 and walker. Temp max 99.1. BP soft, does not meet parameters to notify team. OVSS on room air.     Update: MD removed abdominal dressing this morning.

## 2019-10-09 NOTE — PLAN OF CARE
Patient is voiding adequate amounts, no gas or BM yet, no results from this mornings suppository. Daughter is encouraging patient to eat, walked in halls once with family and walker, moves slowly and appears weak. Pain managed with scheduled Advil and Tylenol, requests an occasional oxycodone. Family at bedside, very supportive.

## 2019-10-09 NOTE — PLAN OF CARE
[id number]:    A&OX4. VSS on RA. POD 1 Diagnostic laparoscopy, exploratory laparotomy, Bilateral salpingo-oophorectomy,  lysis of adhesions (Per MD note). Pain controlled with scheduled tylenol and Ibuprofen during shift. Give IV dilaudid x1 this evening.  On reg diet, denies N/V. Abdominal binder in place. Abdominal incision with marked drainage. Gary pulled and voiding without difficulty. Assist of 1; ambulated out in hallway x1. L PIV SL. No BM yet, not pass gas yet. Denies supp and rescheduled. Daughter at bedside supportive with care. Continue to monitor and maintain pain control.

## 2019-10-10 VITALS
HEIGHT: 60 IN | BODY MASS INDEX: 26.32 KG/M2 | HEART RATE: 78 BPM | RESPIRATION RATE: 15 BRPM | SYSTOLIC BLOOD PRESSURE: 107 MMHG | WEIGHT: 134.04 LBS | TEMPERATURE: 97 F | OXYGEN SATURATION: 98 % | DIASTOLIC BLOOD PRESSURE: 60 MMHG

## 2019-10-10 LAB — COPATH REPORT: NORMAL

## 2019-10-10 PROCEDURE — 25000132 ZZH RX MED GY IP 250 OP 250 PS 637: Performed by: NURSE PRACTITIONER

## 2019-10-10 PROCEDURE — 25000128 H RX IP 250 OP 636: Performed by: OBSTETRICS & GYNECOLOGY

## 2019-10-10 PROCEDURE — 99024 POSTOP FOLLOW-UP VISIT: CPT | Performed by: OBSTETRICS & GYNECOLOGY

## 2019-10-10 PROCEDURE — 25000132 ZZH RX MED GY IP 250 OP 250 PS 637: Performed by: STUDENT IN AN ORGANIZED HEALTH CARE EDUCATION/TRAINING PROGRAM

## 2019-10-10 PROCEDURE — 90682 RIV4 VACC RECOMBINANT DNA IM: CPT | Performed by: OBSTETRICS & GYNECOLOGY

## 2019-10-10 RX ORDER — ACETAMINOPHEN 325 MG/1
650 TABLET ORAL EVERY 6 HOURS PRN
Qty: 24 TABLET | Refills: 0 | Status: SHIPPED | OUTPATIENT
Start: 2019-10-10

## 2019-10-10 RX ORDER — IBUPROFEN 600 MG/1
600 TABLET, FILM COATED ORAL EVERY 6 HOURS PRN
Qty: 12 TABLET | Refills: 0 | Status: SHIPPED | OUTPATIENT
Start: 2019-10-10

## 2019-10-10 RX ORDER — OXYCODONE HYDROCHLORIDE 5 MG/1
5 TABLET ORAL EVERY 6 HOURS PRN
Qty: 10 TABLET | Refills: 0 | Status: SHIPPED | OUTPATIENT
Start: 2019-10-10 | End: 2019-10-29

## 2019-10-10 RX ORDER — OXYCODONE HYDROCHLORIDE 5 MG/1
5 TABLET ORAL EVERY 6 HOURS PRN
Qty: 12 TABLET | Refills: 0 | Status: SHIPPED | OUTPATIENT
Start: 2019-10-10 | End: 2019-10-10

## 2019-10-10 RX ORDER — SENNA AND DOCUSATE SODIUM 50; 8.6 MG/1; MG/1
1 TABLET, FILM COATED ORAL 2 TIMES DAILY PRN
Qty: 40 TABLET | Refills: 0 | Status: SHIPPED | OUTPATIENT
Start: 2019-10-10 | End: 2021-11-18

## 2019-10-10 RX ADMIN — IBUPROFEN 600 MG: 600 TABLET, FILM COATED ORAL at 06:48

## 2019-10-10 RX ADMIN — INFLUENZA A VIRUS A/BRISBANE/02/2018 (H1N1) RECOMBINANT HEMAGGLUTININ ANTIGEN, INFLUENZA A VIRUS A/KANSAS/14/2017 (H3N2) RECOMBINANT HEMAGGLUTININ ANTIGEN, INFLUENZA B VIRUS B/PHUKET/3073/2013 RECOMBINANT HEMAGGLUTININ ANTIGEN, AND INFLUENZA B VIRUS B/MARYLAND/15/2016 RECOMBINANT HEMAGGLUTININ ANTIGEN 0.5 ML: 45; 45; 45; 45 INJECTION INTRAMUSCULAR at 10:16

## 2019-10-10 RX ADMIN — FAMOTIDINE 20 MG: 20 TABLET ORAL at 09:09

## 2019-10-10 RX ADMIN — ACETAMINOPHEN 650 MG: 325 TABLET, FILM COATED ORAL at 10:15

## 2019-10-10 RX ADMIN — ACETAMINOPHEN 650 MG: 325 TABLET, FILM COATED ORAL at 04:42

## 2019-10-10 RX ADMIN — IBUPROFEN 600 MG: 600 TABLET, FILM COATED ORAL at 00:35

## 2019-10-10 ASSESSMENT — PAIN DESCRIPTION - DESCRIPTORS
DESCRIPTORS: ACHING;SORE
DESCRIPTORS: ACHING;SORE

## 2019-10-10 ASSESSMENT — ACTIVITIES OF DAILY LIVING (ADL)
ADLS_ACUITY_SCORE: 15

## 2019-10-10 NOTE — PLAN OF CARE
Patient is passing gas, had a BM yesterday, pain managed with tylenol and Motrin, is tolerating diet. Up ambulating with walker, family present, very helpful. Reviewed discharge orders with patient and daughter.

## 2019-10-10 NOTE — PLAN OF CARE
Midline abdominal incision C/D/I with dermabond. +bowel sounds. Passing flatus, no BM overnight. Denies nausea. PIV saline locked. Abdominal pain controlled with scheduled Tylenol and Ibuprofen. Pt prefers not to take oxycodone, pt reports it makes her dizzy. Voiding spontaneously, not saving. Up with assist x1 and walker. On regular diet. VSS on room air. Plan for discharge home today.

## 2019-10-10 NOTE — PROGRESS NOTES
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 She slept well and pain was well controlled. Denies any nausea, vomiting though mild nausea this morning. Ambulating without issues. She is voiding. Started passing flatus yesterday afternoon. Had a BM. No chest pain, SOB or leg pain.    Objective:   Vitals:    10/08/19 2209 10/09/19 0707 10/09/19 1535 10/10/19 0036   BP: 92/51 105/54 108/64 104/59   BP Location: Right arm Right arm Right arm Right arm   Pulse:    80   Resp: 16 15 14 10   Temp: 99.1  F (37.3  C) 98.4  F (36.9  C) 95.7  F (35.4  C) 97.8  F (36.6  C)   TempSrc: Oral Oral Oral Oral   SpO2: 95% 98% 96% 98%   Weight:       Height:       on RA    General: alert and oriented, in NAD  CV: regular rate and rhythm, no murmurs  Resp: clear bilaterally  Abdomen: soft, appropriately tender, with some voluntary guarding, mildly distended  Incision: VML incision and port sites appears well approximated, clean, dry, intact  Extremities: nontender, no edema, SCDs in place    I/O yesterday // since midnight  PO: 700 ml // NR  IV: 3 ml //   UOP: 600 + 4x ml // NR    Assessment: 59 year old POD#3 s/p diagnostic lsc converted to XL, BSO, TANIA. Doing well post-op.    Active Problem list:  1. Pelvic mass  2. 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   - Postoperative visit scheduled with Dr. Sawyer on 10/30/19, will discuss if any further treatment indicated    # Postoperative state  FEN: regular diet.   Pain: Tylenol, ibuprofen, oxycodone PRN  Heme: anticipate acute blood loss anemia, asymptomatic.  GI: Bowel regimen. Encouraged ambulation.  : no issues    PPX: SCDs, IS, ppx lovenox  Dispo: Anticipate  discharge home today    Amy Schumer, MD  OB/GYN Resident, PGY-3  10/10/19

## 2019-10-10 NOTE — PLAN OF CARE
POD 2. AVSS on RA. Pt taking sched tylenol, sched advil, and prn oxycodone for abd pain management. Midline incision ELI, c/d/i. Pt is now passing gas and stated she had a small, soft BM later this PM. Voiding spont, not saving. Up w/ assist of family members. Regular diet, denies nausea. PIV SL. Discharge tomorrow. Cont w/ POC.

## 2019-10-11 ENCOUNTER — MYC MEDICAL ADVICE (OUTPATIENT)
Dept: CARE COORDINATION | Facility: CLINIC | Age: 59
End: 2019-10-11

## 2019-10-11 ENCOUNTER — PATIENT OUTREACH (OUTPATIENT)
Dept: CARE COORDINATION | Facility: CLINIC | Age: 59
End: 2019-10-11

## 2019-10-11 ENCOUNTER — CARE COORDINATION (OUTPATIENT)
Dept: ONCOLOGY | Facility: CLINIC | Age: 59
End: 2019-10-11

## 2019-10-11 DIAGNOSIS — R19.00 PELVIC MASS IN FEMALE: Primary | ICD-10-CM

## 2019-10-11 DIAGNOSIS — Z71.89 OTHER SPECIFIED COUNSELING: ICD-10-CM

## 2019-10-11 NOTE — PROGRESS NOTES
Clinic Care Coordination Contact  [facility]/Voicemail       Clinical Data: Care Coordinator Outreach  Outreach attempted x 1.  Sent [employer] message asking patient to let me know how she is doing post-operatively today   Plan: Care Coordinator will send care coordination introduction letter with care coordinator contact information and explanation of care coordination services via [employer]. Care Coordinator will try to reach patient again in 1-2 business days.    Dennise Hsieh RN  [facility]-Care Coordination  [facility]  [facility]  [phone]

## 2019-10-11 NOTE — PROGRESS NOTES
Patient daughter states she is doing good. Pain is tolerable. Patient is being encouraged to take Tylenol and Ibuprofen on schedule but patient has not wanted to do this. However, patient is up moving around and does not seem to be hindered by the pain. Eating and drinking well. Denies any signs and symptoms of concern.     No questions or concerns at this time.     Call back number reviewed.     Phuong Dowd RN

## 2019-10-11 NOTE — LETTER
[facility]  [address]  [facility] [address]    October 11, 2019    Gerardo Valenzuela  [address] [place] [id number]      Dear Gerardo,    I am a clinic care coordinator who works with IVONNE Weiss CNP at [facility]. I wanted to introduce myself and provide you with my contact information so that you can call me with questions or concerns about your health care. Below is a description of clinic care coordination and how I can further assist you.     The clinic care coordinator is a registered nurse and/or  who understand the health care system. The goal of clinic care coordination is to help you manage your health and improve access to the [facility] system in the most efficient manner. The registered nurse can assist you in meeting your health care goals by providing education, coordinating services, and strengthening the communication among your providers. The  can assist you with financial, behavioral, psychosocial, chemical dependency, counseling, and/or psychiatric resources.    Please feel free to contact me at [phone], with any questions or concerns. We at [facility] are focused on providing you with the highest-quality healthcare experience possible and that all starts with you.     Sincerely,     Dennise Hsieh RN  [facility]-Care Coordination  [facility]  [facility]  [phone]      Enclosed: I have enclosed a copy of a 24 Hour Access Plan. This has helpful phone numbers for you to call when needed. Please keep this in an easy to access place to use as needed.

## 2019-10-11 NOTE — LETTER
Health Care Home - Access Care Plan    About Me:    Patient Name:  Gerardo Valenzuela    YOB: 1960  Age:                      59 year old   Carol MRN:     [id number] Telephone Information:   Home Phone [phone]   Mobile [phone]       Address:  [address] [place] [address] Email address:  [email or web address]      Emergency Contact(s)   Name Relationship Lgl Grd Work Phone Home Phone Mobile Phone   1. SO VALENZUELA Daughter    [phone]   2. JANELLE VALENZUELA Spouse No [phone]              Health Maintenance:      My Access Plan  Medical Emergency 911   Questions or concerns during clinic hours Primary Clinic Line, I will call the clinic directly: [facility] [phone]   24 Hour Appointment Line [phone] or  [phone]) (toll free)   24 Hour Nurse Line [phone] (toll free)   Questions or concerns outside clinic hours 24 Hour Appointment Line, I will call the after-hours on-call line:   [facility] [phone] or [phone]) (toll-free)   Preferred Urgent Care     Preferred Hospital     Preferred Pharmacy [facility] [place], [place] - [address]     Behavioral Health Crisis Line The National Suicide Prevention Lifeline at 1-275.849.2486 or 911                     My Care Team Members  Patient Care Team       Relationship Specialty Notifications Start End    Bibiana Najera APRN CNP PCP - General Nurse Practitioner - Family  8/7/18     Phone: [phone] Fax: [phone]         [address] [facility] [address]    Bibiana Najera APRN CNP Assigned PCP   8/12/18     Phone: [phone] Fax: [phone]         [address] [facility] [address]    Krystyna Perera MD Referring Physician OB/Gyn  9/18/19     Referring to GynOnc    Phone: [phone] Fax: [phone]         [address] [facility] [address] [facility] [address]    Prashant Sawyer  MD LAMBERTO MARQUEZ Obstetrics & Gynecology, Maternal & Fetal Medicine  9/18/19     Phone: [phone] Fax: [phone]         [address] [facility] [address]           My Medical and Care Information  Problem List   Patient Active Problem List   Diagnosis     CARDIOVASCULAR SCREENING; LDL GOAL LESS THAN 160     Prediabetes     Vitamin D deficiency     Mixed hyperlipidemia     Overweight     Pelvic mass in female     Pelvic mass      Current Medications and Allergies:  See printed Medication Report

## 2019-10-17 NOTE — PROGRESS NOTES
Clinic Care Coordination Contact  Care Team Conversations    Received MyChart message from patient stating she is doing OK after surgery. Has all of her medicines. Stopped narcotic pain medication and is using OTC pain medications with good relief. Instructed patient to schedule follow up appointment with PCP. Patient has no concerns but has my contact information should needs arise. Will close to care coordination services at this time.     Dennise Hsieh RN  [facility]-Care Coordination  [facility]  [facility]  [phone]

## 2019-10-29 ENCOUNTER — OFFICE VISIT (OUTPATIENT)
Dept: FAMILY MEDICINE | Facility: CLINIC | Age: 59
End: 2019-10-29
Payer: COMMERCIAL

## 2019-10-29 VITALS
WEIGHT: 133.5 LBS | BODY MASS INDEX: 26.21 KG/M2 | DIASTOLIC BLOOD PRESSURE: 70 MMHG | HEIGHT: 60 IN | SYSTOLIC BLOOD PRESSURE: 108 MMHG | OXYGEN SATURATION: 98 % | HEART RATE: 79 BPM

## 2019-10-29 DIAGNOSIS — Z09 HOSPITAL DISCHARGE FOLLOW-UP: Primary | ICD-10-CM

## 2019-10-29 DIAGNOSIS — Z90.722 S/P BILATERAL SALPINGO-OOPHORECTOMY: ICD-10-CM

## 2019-10-29 DIAGNOSIS — N83.8 OVARIAN MASS, RIGHT: ICD-10-CM

## 2019-10-29 PROCEDURE — 99213 OFFICE O/P EST LOW 20 MIN: CPT | Performed by: NURSE PRACTITIONER

## 2019-10-29 ASSESSMENT — MIFFLIN-ST. JEOR: SCORE: 1097.05

## 2019-10-29 NOTE — PROGRESS NOTES
Subjective     Gerardo Valenzuela is a 59 year old female who presents to clinic today for the following health issues:    Hospitals in [place]   Hospital follow up       Duration: October 7th     Description (location/character/radiation): mass removal abdomen wanted to f/u with Primary physician    Pain on right side near back and when sits has in groin, swelling in belly continues and sutures are itchy    Tylenol and ibupforen regularly alternating, taking many days but not every day for pain    Eating okay now, no longer nausea needed zofran only once  Right ovary mass thought to be from torsion, pathology result was benign  Thursday will have follow up with her surgeon, they have FMLA forms that will need to be done since she has had off work since ER . Also has questions regarding ongoing pain and returning to activity    Reviewed and updated as needed this visit by Provider         Review of Systems   ROS COMP: Constitutional, HEENT, cardiovascular, pulmonary, GI, , musculoskeletal, neuro, skin, endocrine and psych systems are negative, except as otherwise noted.      Objective    /70 (BP Location: Right arm, Patient Position: Sitting, Cuff Size: Adult Regular)   Pulse 79   Ht 1.524 m (5')   Wt 60.6 kg (133 lb 8 oz)   LMP  (LMP Unknown)   SpO2 98%   BMI 26.07 kg/m    Body mass index is 26.07 kg/m .  Physical Exam   GENERAL: healthy, alert and no distress  RESP: lungs clear to auscultation - no rales, rhonchi or wheezes  CV: regular rate and rhythm, normal S1 S2, no S3 or S4, no murmur, click or rub, no peripheral edema and peripheral pulses strong  ABDOMEN: distended around lower aspect of incision slightly firm slightly tender also in RLQ and LLQ and bowel sounds normal rest of abd soft  MS: no gross musculoskeletal defects noted, no edema  SKIN: sutures not fully dissolved, no open areas otherwise clean and dry appears to be healing  NEURO: Normal strength and tone, mentation intact and speech normal  PSYCH:  mentation appears normal, affect normal/bright    Diagnostic Test Results:  Labs reviewed in Epic  none         Assessment & Plan       ICD-10-CM    1. Hospital discharge follow-up Z09    2. S/P bilateral salpingo-oophorectomy Z90.722    3. Ovarian mass, right N83.8    large mass benign, had necrosis thought due to torsion and doing well overall, ongoing pain and insision issues she will see surgeon in 2 days, advised to ask questions there  They will discuss return to activity and FMLA forms can be done there   Recommended she ask if she can start gentle massage of incision with oil to help reduce scarring and help with healing      BMI:   Estimated body mass index is 26.07 kg/m  as calculated from the following:    Height as of this encounter: 1.524 m (5').    Weight as of this encounter: 60.6 kg (133 lb 8 oz).   Weight management plan: Return for health maintenance exam with fasting lab work      Return for next annual exam or as needed, fasting lab work .    IVONNE Weiss CNP  [facility]

## 2019-10-30 ENCOUNTER — OFFICE VISIT (OUTPATIENT)
Dept: ONCOLOGY | Facility: CLINIC | Age: 59
End: 2019-10-30
Attending: OBSTETRICS & GYNECOLOGY
Payer: COMMERCIAL

## 2019-10-30 VITALS
RESPIRATION RATE: 13 BRPM | TEMPERATURE: 98.2 F | WEIGHT: 133.3 LBS | HEART RATE: 87 BPM | HEIGHT: 60 IN | DIASTOLIC BLOOD PRESSURE: 73 MMHG | BODY MASS INDEX: 26.17 KG/M2 | SYSTOLIC BLOOD PRESSURE: 138 MMHG | OXYGEN SATURATION: 99 %

## 2019-10-30 DIAGNOSIS — D21.9 FIBROMA: Primary | ICD-10-CM

## 2019-10-30 PROCEDURE — G0463 HOSPITAL OUTPT CLINIC VISIT: HCPCS | Mod: ZF

## 2019-10-30 PROCEDURE — 99214 OFFICE O/P EST MOD 30 MIN: CPT | Mod: 24 | Performed by: OBSTETRICS & GYNECOLOGY

## 2019-10-30 ASSESSMENT — PAIN SCALES - GENERAL: PAINLEVEL: MODERATE PAIN (4)

## 2019-10-30 ASSESSMENT — MIFFLIN-ST. JEOR: SCORE: 1096.14

## 2019-10-30 NOTE — LETTER
10/30/2019       RE: Gerardo Valenzuela  [address] [place] [id number]     Dear Colleague,    Thank you for referring your patient, Gerardo Valenzuela, to the [facility]. Please see a copy of my visit note below.                Follow Up Notes on Referred Patient    Date: 2019       Dr. Prashant Sawyer MD  [address] [place] [address]       RE: Gerardo Valenzuela  : 1960  JOANA: 10/30/2019    Dear Dr. Prashant Sawyer:    Gerardo Valenzuela is a 59 year old woman with a diagnosis of ovarian fibroma.    The patient presents today for followup.  Since surgery she has been doing well.  She is eating and drinking well.  No nausea, vomiting, fever or chills.  Normal urinary and bowel function.  No vaginal bleeding or discharge.           Past Medical History:    Past Medical History:   Diagnosis Date     GERD (gastroesophageal reflux disease)      Mixed hyperlipidemia 2018     Pelvic mass in female 10/1/2019    Added automatically from request for surgery [id number]     Prediabetes 2018         Past Surgical History:    Past Surgical History:   Procedure Laterality Date     HEMORRHOIDECTOMY      in [place]     LAPAROSCOPIC SALPINGO-OOPHORECTOMY Bilateral 10/7/2019    Procedure: Diagnostic laparoscopy, exploratory laparotomy, Bilateral SALPINGO-OOPHORECTOMY, lysis of adhesions;  Surgeon: Prashant Sawyer MD;  Location:  OR         Health Maintenance Due   Topic Date Due     ADVANCE CARE PLANNING  1960     COLONOSCOPY  1969     HIV SCREENING  1974     DTAP/TDAP/TD IMMUNIZATION (1 - Tdap) 1984     ZOSTER IMMUNIZATION (1 of 2) 2009     PREVENTIVE CARE VISIT  2018       Current Medications:     Current Outpatient Medications   Medication Sig Dispense Refill     acetaminophen (TYLENOL) 325 MG tablet Take 2 tablets (650 mg) by mouth every 6 hours as needed for mild pain 24 tablet 0     ibuprofen (ADVIL/MOTRIN) 600 MG  tablet Take 1 tablet (600 mg) by mouth every 6 hours as needed for moderate pain 12 tablet 0     vitamin D3 (CHOLECALCIFEROL) 2000 units (50 mcg) tablet Take 1 tablet by mouth daily       famotidine (PEPCID) 20 MG tablet Take 1 tablet (20 mg) by mouth 2 times daily (Patient not taking: Reported on 10/30/2019) 90 tablet 3     SENNA-docusate sodium (SENNA S) 8.6-50 MG tablet Take 1 tablet by mouth 2 times daily as needed (constipation) (Patient not taking: Reported on 10/30/2019) 40 tablet 0         Allergies:        Allergies   Allergen Reactions     Penicillin [Penicillins]         Social History:     Social History     Tobacco Use     Smoking status: Never Smoker     Smokeless tobacco: Never Used   Substance Use Topics     Alcohol use: No       History   Drug Use No         Family History:       Family History   Problem Relation Age of Onset     Cerebrovascular Disease Mother         CVA         Physical Exam:     /73   Pulse 87   Temp 98.2  F (36.8  C) (Oral)   Resp 13   Ht 1.524 m (5')   Wt 60.5 kg (133 lb 4.8 oz)   LMP  (LMP Unknown)   SpO2 99%   BMI 26.03 kg/m     Body mass index is 26.03 kg/m .    General Appearance: healthy and alert, no distress     Musculoskeletal: extremities non tender and without edema    Neurological: normal gait, no gross defects     Psychiatric: appropriate mood and affect                               ABDOMEN:  Soft, nontender, nondistended, well-healing midline incision.             Assessment:    Gerardo Valenzuela is a 59 year old woman with a diagnosis of ovarian fibroma.     A total of 25 minutes was spent with the patient, 20 minutes of which were spent in counseling the patient and/or treatment planning.      1.  Right ovarian fibroma.   2.  Status post bilateral salpingo-oophorectomy.      I discussed with the patient the findings were benign.  There is no additional followup she needs with us.  Recommend to have yearly followups with a gynecologist for  maintenance exams.  The patient agrees with the plan.  She is very appreciative of her care.  All questions were answered.       Prashant Sawyer MD, MS    Department of Obstetrics and Gynecology   Division of Gynecologic Oncology   [facility]  Phone: [phone]      CC  Patient Care Team:  Bibiana Najera APRN CNP as PCP - General (Nurse Practitioner - Family)  Krystyna Perera MD as Referring Physician (OB/Gyn)  Prashant Sawyer MD as MD (Obstetrics & Gynecology, Maternal & Fetal Medicine)

## 2019-10-30 NOTE — PATIENT INSTRUCTIONS
Colorectal Cancer Screening: During our visit today, we discussed that it is recommended you receive colorectal cancer screening. Please call or make an appointment with your primary care provider to discuss this. You may also call the [facility] scheduling line ([phone]) to set up a colonoscopy appointment.

## 2019-10-30 NOTE — NURSING NOTE
Oncology Rooming Note    October 30, 2019 2:01 PM   Gerardo Valenzuela is a 59 year old female who presents for:    Chief Complaint   Patient presents with     Oncology Clinic Visit     Return; Pelvic Mass, Post-op     Initial Vitals: /73   Pulse 87   Temp 98.2  F (36.8  C) (Oral)   Resp 13   Ht 1.524 m (5')   Wt 60.5 kg (133 lb 4.8 oz)   LMP  (LMP Unknown)   SpO2 99%   BMI 26.03 kg/m   Estimated body mass index is 26.03 kg/m  as calculated from the following:    Height as of this encounter: 1.524 m (5').    Weight as of this encounter: 60.5 kg (133 lb 4.8 oz). Body surface area is 1.6 meters squared.  Moderate Pain (4) Comment: Data Unavailable   No LMP recorded (lmp unknown). Patient is postmenopausal.  Allergies reviewed: Yes  Medications reviewed: Yes    Medications: Medication refills not needed today.  Pharmacy name entered into [employer]: [facility] [place], [place] - [address]    Clinical concerns: No new concerns        Itzel Dick CMA

## 2019-11-07 ENCOUNTER — DOCUMENTATION ONLY (OUTPATIENT)
Dept: ONCOLOGY | Facility: CLINIC | Age: 59
End: 2019-11-07

## 2019-11-07 NOTE — PROGRESS NOTES
Form Request Documentation    Date Received in Clinic:  10/29/19  Name/Type of Form: STD  Questions that need to be addressed:   Amount of Leave Requested: Continuous Leave beginning 9/13/19-11/18/19  Date Completed: 11/7/2019  Copy Mailed to patient: Yes on 11/7/2019  Disposition of Form: Faxed to [facility] at [phone] on 11/7/2019

## 2019-11-08 NOTE — PROGRESS NOTES
Follow Up Notes on Referred Patient    Date: 2019       Dr. Prashant Sawyer MD  [address] [place] [address]       RE: Gerardo Valenzuela  : 1960  JOANA: 10/30/2019    Dear Dr. Prashant Sawyer:    Gerardo Valenzuela is a 59 year old woman with a diagnosis of ovarian fibroma.    The patient presents today for followup.  Since surgery she has been doing well.  She is eating and drinking well.  No nausea, vomiting, fever or chills.  Normal urinary and bowel function.  No vaginal bleeding or discharge.           Past Medical History:    Past Medical History:   Diagnosis Date     GERD (gastroesophageal reflux disease)      Mixed hyperlipidemia 2018     Pelvic mass in female 10/1/2019    Added automatically from request for surgery [id number]     Prediabetes 2018         Past Surgical History:    Past Surgical History:   Procedure Laterality Date     HEMORRHOIDECTOMY      in [place]     LAPAROSCOPIC SALPINGO-OOPHORECTOMY Bilateral 10/7/2019    Procedure: Diagnostic laparoscopy, exploratory laparotomy, Bilateral SALPINGO-OOPHORECTOMY, lysis of adhesions;  Surgeon: Prashant Sawyer MD;  Location:  OR         Health Maintenance Due   Topic Date Due     ADVANCE CARE PLANNING  1960     COLONOSCOPY  1969     HIV SCREENING  1974     DTAP/TDAP/TD IMMUNIZATION (1 - Tdap) 1984     ZOSTER IMMUNIZATION (1 of 2) 2009     PREVENTIVE CARE VISIT  2018       Current Medications:     Current Outpatient Medications   Medication Sig Dispense Refill     acetaminophen (TYLENOL) 325 MG tablet Take 2 tablets (650 mg) by mouth every 6 hours as needed for mild pain 24 tablet 0     ibuprofen (ADVIL/MOTRIN) 600 MG tablet Take 1 tablet (600 mg) by mouth every 6 hours as needed for moderate pain 12 tablet 0     vitamin D3 (CHOLECALCIFEROL) 2000 units (50 mcg) tablet Take 1 tablet by mouth daily       famotidine (PEPCID) 20 MG tablet Take 1 tablet (20 mg) by  mouth 2 times daily (Patient not taking: Reported on 10/30/2019) 90 tablet 3     SENNA-docusate sodium (SENNA S) 8.6-50 MG tablet Take 1 tablet by mouth 2 times daily as needed (constipation) (Patient not taking: Reported on 10/30/2019) 40 tablet 0         Allergies:        Allergies   Allergen Reactions     Penicillin [Penicillins]         Social History:     Social History     Tobacco Use     Smoking status: Never Smoker     Smokeless tobacco: Never Used   Substance Use Topics     Alcohol use: No       History   Drug Use No         Family History:       Family History   Problem Relation Age of Onset     Cerebrovascular Disease Mother         CVA         Physical Exam:     /73   Pulse 87   Temp 98.2  F (36.8  C) (Oral)   Resp 13   Ht 1.524 m (5')   Wt 60.5 kg (133 lb 4.8 oz)   LMP  (LMP Unknown)   SpO2 99%   BMI 26.03 kg/m    Body mass index is 26.03 kg/m .    General Appearance: healthy and alert, no distress     Musculoskeletal: extremities non tender and without edema    Neurological: normal gait, no gross defects     Psychiatric: appropriate mood and affect                               ABDOMEN:  Soft, nontender, nondistended, well-healing midline incision.             Assessment:    Gerardo Valenzuela is a 59 year old woman with a diagnosis of ovarian fibroma.     A total of 25 minutes was spent with the patient, 20 minutes of which were spent in counseling the patient and/or treatment planning.      1.  Right ovarian fibroma.   2.  Status post bilateral salpingo-oophorectomy.      I discussed with the patient the findings were benign.  There is no additional followup she needs with us.  Recommend to have yearly followups with a gynecologist for maintenance exams.  The patient agrees with the plan.  She is very appreciative of her care.  All questions were answered.       Prashant Sawyer MD, MS    Department of Obstetrics and Gynecology   Division of Gynecologic  Oncology   [facility]  Phone: [phone]        CC  Patient Care Team:  Bibiana Najera APRN CNP as PCP - General (Nurse Practitioner - Family)  Bibiana Najera APRN CNP as Assigned PCP  Krystnya Perera MD as Referring Physician (OB/Gyn)  Aly Sawyer MD as MD (Obstetrics & Gynecology, Maternal & Fetal Medicine)  ALY SAWYER

## 2019-11-19 ENCOUNTER — OFFICE VISIT (OUTPATIENT)
Dept: FAMILY MEDICINE | Facility: CLINIC | Age: 59
End: 2019-11-19
Payer: COMMERCIAL

## 2019-11-19 VITALS
WEIGHT: 136.2 LBS | HEART RATE: 80 BPM | HEIGHT: 60 IN | OXYGEN SATURATION: 96 % | TEMPERATURE: 97.7 F | SYSTOLIC BLOOD PRESSURE: 121 MMHG | BODY MASS INDEX: 26.74 KG/M2 | DIASTOLIC BLOOD PRESSURE: 68 MMHG

## 2019-11-19 DIAGNOSIS — L76.82 PAIN AT SURGICAL INCISION: Primary | ICD-10-CM

## 2019-11-19 DIAGNOSIS — Z90.722 S/P BILATERAL SALPINGO-OOPHORECTOMY: ICD-10-CM

## 2019-11-19 PROCEDURE — 99213 OFFICE O/P EST LOW 20 MIN: CPT | Performed by: NURSE PRACTITIONER

## 2019-11-19 ASSESSMENT — MIFFLIN-ST. JEOR: SCORE: 1109.3

## 2019-11-19 NOTE — LETTER
[address] [address]  Phone: [phone]  Fax: [phone]    November 19, 2019        Gerardo Valenzuela  [address] [place] [id number]          To whom it may concern:    RE: Gerardo Valenzuela    Patient was seen and treated today at our clinic and is under our care after surgery. I have recommended she take 2 more weeks off work to finish recovering from surgery.     Please contact me for questions or concerns.      Sincerely,        IVONNE Weiss CNP

## 2019-11-19 NOTE — PROGRESS NOTES
Subjective     Gerardo Valenzuela is a 59 year old female who presents to clinic today for the following health issues:    HPI   Concern - Abdominal pain at site of incision   Onset: Oct.10th pain has gotten better but still there and is not sure if she is well enough farhan return to work    Description: pain with sitting long pperiods of time and walking     Intensity: moderate    Progression of Symptoms:  improving, same and constant    Accompanying Signs & Symptoms:  Sharp pains     Previous history of similar problem:   no    Precipitating factors:   Worsened by: pressure to area     Alleviating factors:  Improved by: hot pad and ibuprofren most times     Therapies Tried and outcome: hot pack , IBU    Walking and afterwards it hurts, right side abd and over incision uses the hot pack and tylenol and ibuprofen. All helps when she does, not doing ibuprofen or tylenol regularly any more  FMLA approved until 12/2  Short term disability goes through today and doesn't feel she can go back yet   Was told after surgery would need 6-8 weeks off   All she needs today is a letter to get the 2 weeks additional time off, her surgeon's office couldn't get it to her in time    Patient Active Problem List   Diagnosis     CARDIOVASCULAR SCREENING; LDL GOAL LESS THAN 160     Prediabetes     Vitamin D deficiency     Mixed hyperlipidemia     Overweight     Pelvic mass in female     Pelvic mass     S/P bilateral salpingo-oophorectomy     Past Surgical History:   Procedure Laterality Date     HEMORRHOIDECTOMY  1981    in [place]     LAPAROSCOPIC SALPINGO-OOPHORECTOMY Bilateral 10/7/2019    Procedure: Diagnostic laparoscopy, exploratory laparotomy, Bilateral SALPINGO-OOPHORECTOMY, lysis of adhesions;  Surgeon: Prashant Sawyer MD;  Location:  OR       Social History     Tobacco Use     Smoking status: Never Smoker     Smokeless tobacco: Never Used   Substance Use Topics     Alcohol use: No     Family History   Problem Relation Age  of Onset     Cerebrovascular Disease Mother         CVA         Current Outpatient Medications   Medication Sig Dispense Refill     acetaminophen (TYLENOL) 325 MG tablet Take 2 tablets (650 mg) by mouth every 6 hours as needed for mild pain 24 tablet 0     ibuprofen (ADVIL/MOTRIN) 600 MG tablet Take 1 tablet (600 mg) by mouth every 6 hours as needed for moderate pain 12 tablet 0     famotidine (PEPCID) 20 MG tablet Take 1 tablet (20 mg) by mouth 2 times daily (Patient not taking: Reported on 10/30/2019) 90 tablet 3     SENNA-docusate sodium (SENNA S) 8.6-50 MG tablet Take 1 tablet by mouth 2 times daily as needed (constipation) (Patient not taking: Reported on 10/30/2019) 40 tablet 0     vitamin D3 (CHOLECALCIFEROL) 2000 units (50 mcg) tablet Take 1 tablet by mouth daily       Allergies   Allergen Reactions     Penicillin [Penicillins]        Reviewed and updated as needed this visit by Provider         Review of Systems   ROS COMP: Constitutional, cardiovascular, pulmonary, GI, , musculoskeletal, neuro, skin, systems are negative, except as otherwise noted.      Objective    /68   Pulse 80   Temp 97.7  F (36.5  C) (Oral)   Ht 1.524 m (5')   Wt 61.8 kg (136 lb 3.2 oz)   LMP  (LMP Unknown)   SpO2 96%   BMI 26.60 kg/m    Body mass index is 26.6 kg/m .  Physical Exam   GENERAL: healthy, alert and no distress  RESP: lungs clear to auscultation - no rales, rhonchi or wheezes  CV: regular rate and rhythm, normal S1 S2, no S3 or S4, no murmur, click or rub, no peripheral edema and peripheral pulses strong  ABDOMEN: soft, mild right sided abd tenderness, negative murphys pain seems mostly over lap site, no hepatosplenomegaly, no masses and bowel sounds normal  MS: no gross musculoskeletal defects noted, no edema  SKIN: abd incision healing well very tender to touch especially lower half where it appears skin is not aligned as well on each side appears to be pulling slightly, no erythema or masses  NEURO:  Normal strength and tone, mentation intact and speech normal        Assessment & Plan       ICD-10-CM    1. Pain at surgical incision L76.82    2. S/P bilateral salpingo-oophorectomy Z90.722    incision site heatling well, needing more time off work to allow for complete healing, instructed safe ways to increase activity and exercise, gentle skin massage over incicion to help with healing, letter signed for 2 more weeks off work will reassess at that time, anticipate rtw date 12/2 per surgeon 6-8 weeks this would get her to the 8 wk point for short term disability coverage  Right gilberto saenz appears to be surgical related, saw surgeon for follow up and was told all is expected healing and benign, monitor as it does appear to be improving      BMI:   Estimated body mass index is 26.6 kg/m  as calculated from the following:    Height as of this encounter: 1.524 m (5').    Weight as of this encounter: 61.8 kg (136 lb 3.2 oz).       There are no Patient Instructions on file for this visit.    No follow-ups on file.    IVONNE Weiss CNP  [facility]

## 2019-11-27 ENCOUNTER — TELEPHONE (OUTPATIENT)
Dept: ONCOLOGY | Facility: CLINIC | Age: 59
End: 2019-11-27

## 2019-11-27 ENCOUNTER — OFFICE VISIT (OUTPATIENT)
Dept: FAMILY MEDICINE | Facility: CLINIC | Age: 59
End: 2019-11-27
Payer: COMMERCIAL

## 2019-11-27 VITALS
WEIGHT: 135.3 LBS | BODY MASS INDEX: 26.56 KG/M2 | HEART RATE: 78 BPM | RESPIRATION RATE: 12 BRPM | TEMPERATURE: 97.7 F | HEIGHT: 60 IN | SYSTOLIC BLOOD PRESSURE: 106 MMHG | OXYGEN SATURATION: 99 % | DIASTOLIC BLOOD PRESSURE: 64 MMHG

## 2019-11-27 DIAGNOSIS — G89.18 PAIN ASSOCIATED WITH SURGICAL PROCEDURE: Primary | ICD-10-CM

## 2019-11-27 PROCEDURE — 99213 OFFICE O/P EST LOW 20 MIN: CPT | Performed by: NURSE PRACTITIONER

## 2019-11-27 ASSESSMENT — MIFFLIN-ST. JEOR: SCORE: 1099.6

## 2019-11-27 NOTE — PROGRESS NOTES
Subjective     Gerardo Valenzuela is a 59 year old female who presents to clinic today for the following health issues:    HPI       Hospital Follow-up Visit:    Hospital/Nursing Home/IP Rehab Facility: [facility]  Date of Admission: 10/07/19  Date of Discharge: 10/10/19  Reason(s) for Admission: Pelvic Mass            Problems taking medications regularly:  None       Medication changes since discharge: None       Problems adhering to non-medication therapy:  None    Not actually follow up , Pt had Hosp follow up for this with me on 10/29/19  However still continues to have pain where she had the surgery/fibroma removed   She did follow up with her surgeon 10/30/19 and was told everything was okay  Dr Ozuna     Has forms she needs signed today to say she can go back to work, wants to go back financially but not sure she can do all her duties due to the pain.   Requesting a trial of going back to work on light duty, has paperwork needing to be signed    Did not ask surgeon if this is expected healing, no new symptoms and is improved overall, reports minimal use of tylenol         Patient Active Problem List   Diagnosis     CARDIOVASCULAR SCREENING; LDL GOAL LESS THAN 160     Prediabetes     Vitamin D deficiency     Mixed hyperlipidemia     Overweight     Pelvic mass in female     Pelvic mass     S/P bilateral salpingo-oophorectomy     Past Surgical History:   Procedure Laterality Date     HEMORRHOIDECTOMY  1981    in [place]     LAPAROSCOPIC SALPINGO-OOPHORECTOMY Bilateral 10/7/2019    Procedure: Diagnostic laparoscopy, exploratory laparotomy, Bilateral SALPINGO-OOPHORECTOMY, lysis of adhesions;  Surgeon: Prashant Sawyer MD;  Location:  OR       Social History     Tobacco Use     Smoking status: Never Smoker     Smokeless tobacco: Never Used   Substance Use Topics     Alcohol use: No     Family History   Problem Relation Age of Onset     Cerebrovascular Disease Mother         CVA      "    Current Outpatient Medications   Medication Sig Dispense Refill     acetaminophen (TYLENOL) 325 MG tablet Take 2 tablets (650 mg) by mouth every 6 hours as needed for mild pain 24 tablet 0     famotidine (PEPCID) 20 MG tablet Take 1 tablet (20 mg) by mouth 2 times daily 90 tablet 3     ibuprofen (ADVIL/MOTRIN) 600 MG tablet Take 1 tablet (600 mg) by mouth every 6 hours as needed for moderate pain 12 tablet 0     SENNA-docusate sodium (SENNA S) 8.6-50 MG tablet Take 1 tablet by mouth 2 times daily as needed (constipation) 40 tablet 0     vitamin D3 (CHOLECALCIFEROL) 2000 units (50 mcg) tablet Take 1 tablet by mouth daily       Allergies   Allergen Reactions     Penicillin [Penicillins]      BP Readings from Last 3 Encounters:   11/27/19 106/64   11/19/19 121/68   10/30/19 138/73    Wt Readings from Last 3 Encounters:   11/27/19 61.4 kg (135 lb 4.8 oz)   11/19/19 61.8 kg (136 lb 3.2 oz)   10/30/19 60.5 kg (133 lb 4.8 oz)                      Reviewed and updated as needed this visit by Provider         Review of Systems   ROS COMP: Constitutional, HEENT, cardiovascular, pulmonary, GI, , musculoskeletal, neuro, skin, endocrine and psych systems are negative, except as otherwise noted.      Objective    /64   Pulse 78   Temp 97.7  F (36.5  C) (Temporal)   Resp 12   Ht 1.515 m (4' 11.65\")   Wt 61.4 kg (135 lb 4.8 oz)   LMP  (LMP Unknown)   SpO2 99%   BMI 26.74 kg/m    Body mass index is 26.74 kg/m .  Physical Exam   GENERAL: healthy, alert and no distress  NECK: no adenopathy, no asymmetry, masses, or scars and thyroid normal to palpation  RESP: lungs clear to auscultation - no rales, rhonchi or wheezes  CV: regular rate and rhythm, normal S1 S2, no S3 or S4, no murmur, click or rub, no peripheral edema and peripheral pulses strong  ABDOMEN: soft, continued tenderness over right abd and incision, without hepatosplenomegaly or masses and bowel sounds normal  MS: no gross musculoskeletal defects noted, " no edema  NEURO: Normal strength and tone, mentation intact and speech normal  PSYCH: mentation appears normal, affect normal/bright    Diagnostic Test Results:  Labs reviewed in Epic        Assessment & Plan       ICD-10-CM    1. Pain associated with surgical procedure G89.18    vs other abd pain, no infection symptoms or new symptoms, referred back to surgeon to determine if further testing is needed or return to see me if they do not feel this is surgical     Discussed we do not do workability for surgeon's and I started the form to allow her to go back on light duty trial, final plan TBD by her surgeon who is out of clinic. I do want her to be seen by them since she continues to have pain and reports not being able to do all her work duties although walking isn't a problem, advised she stop working if any concerns at all and wait to be cleared by specialist if pain worsens or any concerns.     Pt was told to see any GYN since her doctor is out currently, I Consulted with Carol Perry here today and she advised pt return to her original ObGyn service who performed the surgery   Since issues are related to the mass removed, needs to go back to someone at least within their service if there is any question regarding ongoing pain and to determine if any pysical limitations pt may or may not have.     RN did advocate and called office of Dr Ozuna, got her set for appointment there with NP there Friday, pt and dtr aware of plan      Patient Instructions   Please call the [facility] scheduling line ([phone]) to set up a colonoscopy appointment.      Return for next annual exam or as needed.    IVONNE Weiss CNP  [facility]

## 2019-11-27 NOTE — TELEPHONE ENCOUNTER
RN was contacted by Obgyn clinic. Patient has been seen twice in the clinic recently for continued pelvic pain after surgery.     OBGYN MD requested patient be seen by Gyn/Onc team as soon as possible. Patients surgeon is out of clinic nor do we have MD in clinic this week. OBGYN MD requested patent be seen this week. MD aware only available appointments are with NP.      Patient to be scheduled with NP Friday.     Phuong Dowd RN

## 2019-12-03 ENCOUNTER — TELEPHONE (OUTPATIENT)
Dept: FAMILY MEDICINE | Facility: CLINIC | Age: 59
End: 2019-12-03

## 2019-12-03 DIAGNOSIS — L76.82 PAIN AT SURGICAL INCISION: ICD-10-CM

## 2019-12-03 DIAGNOSIS — R10.31 RLQ ABDOMINAL PAIN: ICD-10-CM

## 2019-12-03 DIAGNOSIS — Z09 HOSPITAL DISCHARGE FOLLOW-UP: Primary | ICD-10-CM

## 2019-12-04 NOTE — TELEPHONE ENCOUNTER
I unfortunately did discuss this with pt and dtr this would likely happen with unclear guidance from ObGyn.     Pt was having continued pain and reported not being able to do her regular work duties last time I saw her.   I do not feel comfortable changing the paperwork until she has appointment with her surgeon to clear her to do all duties--I did discuss with Dr. Julissa Perry here and she instructed pt to go back to her service if pt having ongoing pain after surgery to make a plan.     If her ObGyn service is not willing to see her back please ask if ObGyn will see her here, or does she need a workability assessment we can give her a list of numbers to set one up if work is requiring details.    I will put in a referral for Care Coordination team to assist if needed just let me know if that would be appropriate    Thanks  Bbiiana CORONADO CNP

## 2019-12-04 NOTE — TELEPHONE ENCOUNTER
OK I referred her and Dennise please discuss if easier in person.     If pt in the meantime happens to improve and feels she can go back to work without limitations let me know and we could sign a form to return full duty--routing back to RNs FYI.     otherwise will need to see ObGyn for next steps see below     Thanks  Bibiana CORONADO CNP

## 2019-12-04 NOTE — TELEPHONE ENCOUNTER
Reason for call:  Other   Patient called regarding (reason for call): appointment and call back  Additional comments: Patient stated Bibiana completed a return to work form for her that needs to be updated. The patient stated Bibiana wrote on the form that she can not do much work, patient stated that needs to be fixed. Patient stated that the employer will not let her return to work with this respond. Patient is wondering if this can be changed to saying something that will allow her to go back to work. Patient would like a follow up regarding this.     Phone number to reach patient:  Cell number on file:    Telephone Information:   Mobile [phone]       Best Time:  N/A    Can we leave a detailed message on this number?  YES

## 2019-12-04 NOTE — TELEPHONE ENCOUNTER
Bibiana    See pt message    Form at your work station    Paula Juarez RN   [facility]

## 2019-12-04 NOTE — TELEPHONE ENCOUNTER
Bibiana    Spoke with pt and daughter, gave them the detailed message and they would like a referral for care coordination    Referral cued    Paula Juarez RN   [facility]

## 2019-12-10 ENCOUNTER — PATIENT OUTREACH (OUTPATIENT)
Dept: CARE COORDINATION | Facility: CLINIC | Age: 59
End: 2019-12-10

## 2019-12-29 NOTE — PATIENT INSTRUCTIONS
Please call the  [employer] scheduling line ([phone]) to set up a colonoscopy appointment.

## 2020-09-12 ENCOUNTER — ANCILLARY PROCEDURE (OUTPATIENT)
Dept: GENERAL RADIOLOGY | Facility: CLINIC | Age: 60
End: 2020-09-12
Attending: FAMILY MEDICINE
Payer: COMMERCIAL

## 2020-09-12 ENCOUNTER — OFFICE VISIT (OUTPATIENT)
Dept: URGENT CARE | Facility: URGENT CARE | Age: 60
End: 2020-09-12
Payer: COMMERCIAL

## 2020-09-12 VITALS
TEMPERATURE: 98.5 F | DIASTOLIC BLOOD PRESSURE: 76 MMHG | HEIGHT: 61 IN | HEART RATE: 96 BPM | OXYGEN SATURATION: 99 % | SYSTOLIC BLOOD PRESSURE: 148 MMHG | WEIGHT: 142 LBS | BODY MASS INDEX: 26.81 KG/M2

## 2020-09-12 DIAGNOSIS — M25.561 RIGHT KNEE PAIN, UNSPECIFIED CHRONICITY: Primary | ICD-10-CM

## 2020-09-12 PROCEDURE — 99214 OFFICE O/P EST MOD 30 MIN: CPT | Performed by: FAMILY MEDICINE

## 2020-09-12 PROCEDURE — 73562 X-RAY EXAM OF KNEE 3: CPT | Mod: RT

## 2020-09-12 ASSESSMENT — MIFFLIN-ST. JEOR: SCORE: 1146.49

## 2020-09-12 NOTE — LETTER
September 12, 2020      Gerardo Vlaenzuela  [address] [place] [phone]        To Whom It May Concern:    Gerardo Valenzuela  was seen on 9/12.  Please excuse her from work 9/13 - 9/14 due to right knee pain.        Sincerely,        Rudolph Juan MD

## 2020-09-13 NOTE — PROGRESS NOTES
SUBJECTIVE:  Chief Complaint   Patient presents with     Urgent Care     Knee Pain     Reports has been having some pain behind right knee for past two weeks; earlier today while walking heard a cracking sound and suddenly was unable to bear weight on right leg at all due to pain.     Gerardo Valenzuela is a 60 year old female who presents with a chief complaint of right knee pain and decreased range of motion.    Patient has had intermittent problems with both knees chronically but usually goes away.  Over the past 2 weeks has been having more discomfort behind the right knee.    Had varicose veins since teenager, has gotten worse over the years.  Patient has used compression stocking in the past but not consistently.    Patient developed acute pain around 3:30, heard a pop in front of knee then was not able to walk/bear weight, states that pain went up to back and down to feet.  denies any falls or trauma.  Felt like back of knee was more swollen.  Worried that may have developed a blood clot.  Took a baby aspirin and ibuprofen 800 mg.  Patient is able to bend knee a little now since initial insult.    Past Medical History:   Diagnosis Date     GERD (gastroesophageal reflux disease)      Mixed hyperlipidemia 8/20/2018     Pelvic mass in female 10/1/2019    Added automatically from request for surgery [id number]     Prediabetes 8/20/2018     Current Outpatient Medications   Medication Sig Dispense Refill     aspirin (ASA) 325 MG EC tablet Take 325 mg by mouth every 6 hours as needed for moderate pain       ibuprofen (ADVIL/MOTRIN) 600 MG tablet Take 1 tablet (600 mg) by mouth every 6 hours as needed for moderate pain 12 tablet 0     vitamin D3 (CHOLECALCIFEROL) 2000 units (50 mcg) tablet Take 1 tablet by mouth daily       acetaminophen (TYLENOL) 325 MG tablet Take 2 tablets (650 mg) by mouth every 6 hours as needed for mild pain (Patient not taking: Reported on 9/12/2020) 24 tablet 0     famotidine (PEPCID) 20 MG  "tablet Take 1 tablet (20 mg) by mouth 2 times daily (Patient not taking: Reported on 9/12/2020) 90 tablet 3     SENNA-docusate sodium (SENNA S) 8.6-50 MG tablet Take 1 tablet by mouth 2 times daily as needed (constipation) (Patient not taking: Reported on 9/12/2020) 40 tablet 0     Social History     Tobacco Use     Smoking status: Never Smoker     Smokeless tobacco: Never Used   Substance Use Topics     Alcohol use: No       ROS:  Review of systems negative except as stated above.    EXAM:   BP (!) 148/76   Pulse 96   Temp 98.5  F (36.9  C) (Oral)   Ht 1.549 m (5' 1\")   Wt 64.4 kg (142 lb)   LMP  (LMP Unknown)   SpO2 99%   BMI 26.83 kg/m    M/S Exam:right knee - tenderness in popliteal area, slight swelling.  Mild discomfort anterior knee.  Small effusion lateral aspect of knee.  ROM decrease.  Large scattered varicose veins on leg.  No edema on lower leg  GENERAL APPEARANCE: healthy, alert and mild distress  EXTREMITIES: peripheral pulses normal  PSYCH: alert, affect bright    X-RAY was done - right knee - no acute fracture, mild DJD changes personally viewed by me    ASSESSMENT/PLAN:  (M25.561) Right knee pain, unspecified chronicity  (primary encounter diagnosis)  Comment: possible popliteal cyst  Plan: XR Knee Right 3 Views, Crutches Order for DME -        ONLY FOR DME, Orthopedic & Spine          Referral, Miscellaneous Order for DME - ONLY         FOR DME            Reassurance given, reviewed symptomatic treatment with tylenol, ibuprofen, rest, ice, elevation.  Reviewed that back of knee swelling and pain may be due to popliteal cyst.  Patient has underlying varicose veins and this may also worsen symptoms but reviewed that these veins are not the DVT that she is concerned about.  DME for crutches and DME for knee brace given in clinic.  Refer to [facility] for further evaluation.    Work excuse note given to be off work  Follow up with [facility] within 1 week    Rudolph Juan MD  September 12, 2020 8:12 " PM

## 2020-09-13 NOTE — PATIENT INSTRUCTIONS
Okay to take ibuprofen 200 mg - 4 tablets (800 mg) every 8 hours as needed.  Okay to take tylenol 500 mg - 2 tablets (1000 mg) every 6-8 hours as needed, do not exceed 3000 mg in 24 hours.  Rest, ice, elevation    Use crutches for non-weight bearing until symptoms improve  Use knee sleeve/brace for support and comfort    Follow up with [facility]      Patient Education     Knee Pain  Knee pain is very common. It s especially common in active people who put a lot of pressure on their knees, like runners. It affects women more often than men.  Your kneecap (patella) is a thick, round bone. It covers and protects the front portion of your knee joint. It moves along a groove in your thighbone (femur) as part of the patellofemoral joint. A layer of cartilage surrounds the underside of your kneecap. This layer protects it from grinding against your femur.  When this cartilage softens and breaks down, it can cause knee pain. This is partly because of repetitive stress. The stress irritates the lining of the joint. This causes pain in the underlying bone.  What causes knee pain?  Many things can cause knee pain. You may have more than one cause. Some of these include:    Overuse of the knee joint    The kneecap doesn t line up with the tissue around it    Damage to small nerves in the area    Damage to the ligament-like structure that holds the kneecap in place (retinaculum)    Breakdown of the bone under the cartilage    Swelling in the soft tissues around the kneecap    Injury  You might be more likely to have knee pain if you:    Exercise a lot    Recently increased the intensity of your workouts    Have a body mass index (BMI) greater than 25    Have poor alignment of your kneecap    Walk with your feet turned overly outward or inward    Have weakness in surrounding muscle groups (inner quad or hip adductor muscles)    Have too much tightness in surrounding muscle groups (hamstrings or iliotibial band)    Have  a recent history of injury to the area    Are female  Symptoms of knee pain  This type of knee pain is a dull, aching pain in the front of the knee in the area under and around the kneecap. This pain may start quickly or slowly. Your pain might be worse when you squat, run, or sit for a long time. Stairclimbing may be painful or difficult. You might also sometimes feel like your knee is giving out. You may have symptoms in one or both of your knees.  Diagnosing knee pain  Your healthcare provider will ask about your medical history and your symptoms. Be sure to describe any activities that make your knee pain worse. He or she will look at your knee. This will include tests of your range of motion, strength, and areas of pain of your knee. Your knee alignment will be checked.  Your healthcare provider will need to rule out other causes of your knee pain, such as arthritis. You may need an imaging test, such as an X-ray or MRI.  Treatment for knee pain  Treatments that can help ease your symptoms may include:    Avoiding activities for a while that make your pain worse, returning to activity over time    Icing the outside of your knee when it causes you pain    Taking over-the-counter pain medicine    Wearing a knee brace or taping your knee to support it    Wearing special shoe inserts to help keep your feet in the proper alignment    Doing special exercises to stretch and strengthen the muscles around your hip and your knee  These steps help most people manage knee pain. But some cases of knee pain need to be treated with surgery. You rarely need surgery right away. You may need it later if other treatments don t work. Your healthcare provider may refer you to an orthopedic surgeon. He or she will talk with you about your choices.  Preventing knee pain  Losing weight and correcting excess muscle tightness or muscle weakness may help lower your risk.  In some cases, you can prevent knee pain. To help prevent a  flare-up of knee pain, do these things:    Regularly do all the exercises your doctor or physical therapist advises    Support your knee as advised by your doctor or physical therapist    Increase training gradually, and ease up on training when needed    Have an expert check your gait for running or other sporting activities    Stretch properly before and after exercise    Replace your running shoes regularly    Lose excess weight  When to call your healthcare provider  Call your healthcare provider right away if:    Your symptoms don t get better after a few weeks of treatment    You have any new symptoms  Date Last Reviewed: 4/1/2017 2000-2019 [employer]. [address] [place] [id number]. All rights reserved. This information is not intended as a substitute for professional medical care. Always follow your healthcare professional's instructions.           Patient Education     Eckert s Cyst    You have a Baker s cyst. This is a lump or bulge in the back of your knee. It is caused when extra joint fluid flows into a small sac behind the knee. The extra fluid occurs because arthritis or a torn cartilage irritates the knee joint.  A small Eckert s cyst often has no symptoms. A larger cyst can cause some knee pain or a feeling of pressure behind your knee when you try to fully straighten or bend that joint. A Baker s cyst can leak, leading fluid to move down into your lower leg. This causes swelling, pain, and redness.  Treatment may involve draining the extra fluid. Or medicine may be injected to reduce redness and swelling. If the extra fluid is caused by a torn cartilage, then surgery to repair the cartilage may be the best treatment option. If arthritis is the cause, and it does not get better with treatment, surgery may need to address the arthritis and cyst.  Home care    If you have knee pain, stay off the affected leg as much as possible until the pain eases.    Apply an ice pack to  the painful area for no more than 20 minutes. Do this every 3 to 6 hours for the first 24 to 48 hours. Keep using ice packs 3 to 4 times a day for the next few days, as needed for pain. To make an ice pack, put ice cubes in a sealed zip-lock plastic bag. Wrap the bag in a clean, thin towel or cloth. Never put ice or an ice pack directly on the skin.    If you were given a hook-and-loop knee brace, you may open the brace to apply ice. Unless told otherwise, you may remove the brace to bathe and sleep.    You may use over-the-counter pain medicine to control pain, unless another medicine was prescribed. Talk with your provider before using these medicines if you have chronic liver or kidney disease, or have ever had a stomach ulcer or gastrointestinal bleeding.       If crutches or a walker have been recommended, don t bear full weight on your injured leg until you can do so without pain. Check with your provider before returning to sports or full work duties.    Follow-up care  Follow up with your healthcare provider within 1 to 2 weeks, or as advised.  If X-rays were taken, you will be notified of any new findings that may affect your care.  When to seek medical advice  Call your healthcare provider right away if any of these occur:    Toes or foot become swollen, cold, blue, numb or tingly    Pain or swelling increases    Warmth or redness appears over the knee    Redness, swelling or pain occurs in the calf or lower leg    Fever or chills  Date Last Reviewed: 5/1/2018 2000-2019 [employer]. [address] [place] [address], [place] [address]. All rights reserved. This information is not intended as a substitute for professional medical care. Always follow your healthcare professional's instructions.

## 2020-09-14 ENCOUNTER — OFFICE VISIT (OUTPATIENT)
Dept: ORTHOPEDICS | Facility: CLINIC | Age: 60
End: 2020-09-14
Attending: FAMILY MEDICINE
Payer: COMMERCIAL

## 2020-09-14 VITALS
WEIGHT: 142 LBS | DIASTOLIC BLOOD PRESSURE: 76 MMHG | SYSTOLIC BLOOD PRESSURE: 132 MMHG | BODY MASS INDEX: 26.81 KG/M2 | HEIGHT: 61 IN

## 2020-09-14 DIAGNOSIS — M25.561 RIGHT KNEE PAIN, UNSPECIFIED CHRONICITY: ICD-10-CM

## 2020-09-14 PROCEDURE — 99203 OFFICE O/P NEW LOW 30 MIN: CPT | Performed by: PEDIATRICS

## 2020-09-14 ASSESSMENT — MIFFLIN-ST. JEOR: SCORE: 1146.49

## 2020-09-14 NOTE — PATIENT INSTRUCTIONS
We discussed icing, heat, over the counter medication as needed  Continue with brace for comfort, and crutch(es) as needed  Monitor next 1 week  Letter provided today, rest for the next 1 week  Future consideration may include MRI of the knee; contact clinic with update within 1 week, sooner if needed

## 2020-09-14 NOTE — LETTER
9/14/2020         RE: Gerardo Valenzuela  [address] [place] [address]  [place] [phone]        Dear Colleague,    Thank you for referring your patient, Gerardo Valenzuela, to the [facility]. Please see a copy of my visit note below.    Sports Medicine Clinic Visit    PCP: Bibiana Najera    Gerardo Valenzuela is a 60 year old female who is seen  in consultation at the request of  Rudolph Juan M.D. presenting with right knee pain.  While walking she heard a crack in her knee and her knee gave out on her 9/12/20.  She was seen at  and x rays were taken.  She was given crutches and has been using a knee brace.      3 weeks prior to this she pulled a refrigerator while at work and had pain down her right low back. .   Self treated with ice and heat and states there were no issues since that time.    No pain in her low back  currently   Here today with her daughter     Injury: insidious onset   **  Noted anterior knee crack sensation, noted pain, then felt like right knee kind of gave way.  Does not recall any other specific motion or injury to knee with walking.  Has brace and single crutch.  No additional joint noise since injury.      Location of Pain: right lateral and posterior knee and lower leg.    Duration of Pain: 2 day(s)  Rating of Pain at worst: 10/10  Rating of Pain Currently: 8/10  Pain is better with: Rest  Pain is worse with: walking   Additional Features: swelling, instability   Other treatments so far consists of: x rays  Prior History of related problems: denies     Social History:  at [employer]     Review of Systems  Musculoskeletal: as above  Remainder of review of systems is negative including constitutional, CV, skin, neurologic, pulmonary, GI, except as noted in HPI or medical history.    The patient's past medical and surgical history, social history, family history, problem list, and medication list has been reviewed and is as noted above and  in chart.    Past Medical History:   Diagnosis Date     GERD (gastroesophageal reflux disease)      Mixed hyperlipidemia 8/20/2018     Pelvic mass in female 10/1/2019    Added automatically from request for surgery [id number]     Prediabetes 8/20/2018     Past Surgical History:   Procedure Laterality Date     HEMORRHOIDECTOMY  1981    in [place]     LAPAROSCOPIC SALPINGO-OOPHORECTOMY Bilateral 10/7/2019    Procedure: Diagnostic laparoscopy, exploratory laparotomy, Bilateral SALPINGO-OOPHORECTOMY, lysis of adhesions;  Surgeon: Prashant Sawyer MD;  Location:  OR     Family History   Problem Relation Age of Onset     Cerebrovascular Disease Mother         CVA     Social History     Socioeconomic History     Marital status:      Spouse name: Not on file     Number of children: Not on file     Years of education: Not on file     Highest education level: Not on file   Occupational History     Occupation: Housekeeping     Employer: [employer]   Social Needs     Financial resource strain: Not on file     Food insecurity     Worry: Not on file     Inability: Not on file     Transportation needs     Medical: Not on file     Non-medical: Not on file   Tobacco Use     Smoking status: Never Smoker     Smokeless tobacco: Never Used   Substance and Sexual Activity     Alcohol use: No     Drug use: No     Sexual activity: Yes     Partners: Male   Lifestyle     Physical activity     Days per week: Not on file     Minutes per session: Not on file     Stress: Not on file   Relationships     Social connections     Talks on phone: Not on file     Gets together: Not on file     Attends Nondenominational service: Not on file     Active member of club or organization: Not on file     Attends meetings of clubs or organizations: Not on file     Relationship status: Not on file     Intimate partner violence     Fear of current or ex partner: Not on file     Emotionally abused: Not on file     Physically abused: Not on file     Forced  "sexual activity: Not on file   Other Topics Concern     Parent/sibling w/ CABG, MI or angioplasty before 65F 55M? No   Social History Narrative     Not on file         Objective  /76   Ht 1.549 m (5' 1\")   Wt 64.4 kg (142 lb)   LMP  (LMP Unknown)   BMI 26.83 kg/m      GENERAL APPEARANCE: healthy, alert and no distress   GAIT: crutches and knee brace   SKIN: no suspicious lesions or rashes  NEURO: Normal strength and tone, mentation intact and speech normal  PSYCH:  mentation appears normal and affect normal/bright  HEENT: no scleral icterus  CV: distal perfusion intact  RESP: nonlabored breathing      Right Knee exam    ROM:      Flexion  degrees       Extension actively 10 degrees, passive grossly full       Range of motion limited by pain, tightness    Patellar Motion:      Minimal Crepitus noted in the patellofemoral joint    Tender:      Some pain with patellar translation    Non Tender:       remainder of knee area    Special Tests:      Some anterior knee pain with Jessie       neg (-) Lachman       neg (-) anterior drawer       neg (-) posterior drawer       neg (-) varus       neg (-) valgus       no pain with forced extension    no effusion  no ecchymosis      Radiology  Visualized radiographs as noted below, and reviewed the images with the patient; if the report was available at the time of the visit, the report was reviewed as well.      Results for orders placed or performed in visit on 09/12/20   XR Knee Right 3 Views    Narrative    KNEE RIGHT THREE VIEWS    9/12/2020 7:55 PM     HISTORY: Right knee pain, unspecified chronicity.    COMPARISON: None.      Impression    IMPRESSION: Joint spaces are preserved. No evidence of acute fracture  or joint effusion. Prominence of bone on the lateral aspect of lateral  femoral condyle with a small 7.5 cm elliptically shaped defect. This  is likely residua from prior trauma.    CLEMENTINA HEATH MD       Assessment:  1. Right knee pain, unspecified " chronicity        Plan:  Discussed the assessment with the patient.  Favor patellofemoral source. No acute bony abnormality on x-ray. Some improvement by history.  We discussed the following: symptom treatment, activity modification/rest, imaging, rehab, potential for improvement with time and support for the affected area. Following discussion, plan:  See patient instructions.  May continue with current brace, and crutches are prn.  We discussed consideration of additional imaging of the affected area, but this is not required currently given assessment and plan for other intervention.  Discussed therapy; she prefers to monitor for now.   Letter provided regarding activities.  Follow up: 1 week if not improving well, or sooner if needed.  Questions answered. Discussed signs and symptoms that may indicate more serious issues; the patient was instructed to seek appropriate care if noted. Letebrhan indicates understanding of these issues and agrees with the plan.        Ronen Mcmahon DO, CAQ      CC: Rudolph Juan          Patient Instructions   We discussed icing, heat, over the counter medication as needed  Continue with brace for comfort, and crutch(es) as needed  Monitor next 1 week  Letter provided today, rest for the next 1 week  Future consideration may include MRI of the knee; contact clinic with update within 1 week, sooner if needed        This note consists of symbols derived from keyboarding, dictation and/or voice recognition software. As a result, there may be errors in the script that have gone undetected. Please consider this when interpreting information found in this chart.        Again, thank you for allowing me to participate in the care of your patient.        Sincerely,        Ronen Mcmahon DO

## 2020-09-14 NOTE — LETTER
REPORT OF WORK ABILITY    NOTE TO EMPLOYEE: You must promptly provide a copy of this report to your  employer or worker's compensation insurer, and Qualified Rehabilitation Consultant.    Date: 9/14/2020                     Employee Name: Gerardo Valenzuela         YOB: 1960  Medical Record Number: [id number]   Soc.Sec.No: [id number]  Employer: None                Date of Injury: 9/12/20  Managed Care Organization / Insurance Company Name: UNKNOWN    Diagnosis: right knee injury  Work Related: yes     MMI: NO   Permanent Partial Disability(PPD) likely: UNKNOWN    EMPLOYEE IS ABLE TO WORK: OFF work for 1 week, through Monday 9/21/20.     RESTRICTIONS IF ANY:    Stand:    Sit:    Walk:   Kneel/[place]:    Lift, carry no more than:    Push/Pull:    Ladder/Stair climb:    Stoop:      OTHER RESTRICTIONS: None    TREATMENT PLAN/NOTES: change work position for comfort, best work height mid chest to mid thigh, may need to restrict work activities for comfort, knee brace as needed and heat/cold pack for comfort, Elevate and Rest as needed.            Ronen NIX.

## 2020-09-14 NOTE — PROGRESS NOTES
Sports Medicine Clinic Visit    PCP: Bibiana Najera JOHN Valenzuela is a 60 year old female who is seen  in consultation at the request of  Rudolph Juan M.D. presenting with right knee pain.  While walking she heard a crack in her knee and her knee gave out on her 9/12/20.  She was seen at  and x rays were taken.  She was given crutches and has been using a knee brace.      3 weeks prior to this she pulled a refrigerator while at work and had pain down her right low back. .   Self treated with ice and heat and states there were no issues since that time.    No pain in her low back  currently   Here today with her daughter     Injury: insidious onset   **  Noted anterior knee crack sensation, noted pain, then felt like right knee kind of gave way.  Does not recall any other specific motion or injury to knee with walking.  Has brace and single crutch.  No additional joint noise since injury.      Location of Pain: right lateral and posterior knee and lower leg.    Duration of Pain: 2 day(s)  Rating of Pain at worst: 10/10  Rating of Pain Currently: 8/10  Pain is better with: Rest  Pain is worse with: walking   Additional Features: swelling, instability   Other treatments so far consists of: x rays  Prior History of related problems: denies     Social History:  at [employer]     Review of Systems  Musculoskeletal: as above  Remainder of review of systems is negative including constitutional, CV, skin, neurologic, pulmonary, GI, except as noted in HPI or medical history.    The patient's past medical and surgical history, social history, family history, problem list, and medication list has been reviewed and is as noted above and in chart.    Past Medical History:   Diagnosis Date     GERD (gastroesophageal reflux disease)      Mixed hyperlipidemia 8/20/2018     Pelvic mass in female 10/1/2019    Added automatically from request for surgery [id number]     Prediabetes 8/20/2018     Past  "Surgical History:   Procedure Laterality Date     HEMORRHOIDECTOMY  1981    in [place]     LAPAROSCOPIC SALPINGO-OOPHORECTOMY Bilateral 10/7/2019    Procedure: Diagnostic laparoscopy, exploratory laparotomy, Bilateral SALPINGO-OOPHORECTOMY, lysis of adhesions;  Surgeon: Prashant Sawyer MD;  Location:  OR     Family History   Problem Relation Age of Onset     Cerebrovascular Disease Mother         CVA     Social History     Socioeconomic History     Marital status:      Spouse name: Not on file     Number of children: Not on file     Years of education: Not on file     Highest education level: Not on file   Occupational History     Occupation: Housekeeping     Employer: [employer]   Social Needs     Financial resource strain: Not on file     Food insecurity     Worry: Not on file     Inability: Not on file     Transportation needs     Medical: Not on file     Non-medical: Not on file   Tobacco Use     Smoking status: Never Smoker     Smokeless tobacco: Never Used   Substance and Sexual Activity     Alcohol use: No     Drug use: No     Sexual activity: Yes     Partners: Male   Lifestyle     Physical activity     Days per week: Not on file     Minutes per session: Not on file     Stress: Not on file   Relationships     Social connections     Talks on phone: Not on file     Gets together: Not on file     Attends Protestant service: Not on file     Active member of club or organization: Not on file     Attends meetings of clubs or organizations: Not on file     Relationship status: Not on file     Intimate partner violence     Fear of current or ex partner: Not on file     Emotionally abused: Not on file     Physically abused: Not on file     Forced sexual activity: Not on file   Other Topics Concern     Parent/sibling w/ CABG, MI or angioplasty before 65F 55M? No   Social History Narrative     Not on file         Objective  /76   Ht 1.549 m (5' 1\")   Wt 64.4 kg (142 lb)   LMP  (LMP Unknown)  "  BMI 26.83 kg/m      GENERAL APPEARANCE: healthy, alert and no distress   GAIT: crutches and knee brace   SKIN: no suspicious lesions or rashes  NEURO: Normal strength and tone, mentation intact and speech normal  PSYCH:  mentation appears normal and affect normal/bright  HEENT: no scleral icterus  CV: distal perfusion intact  RESP: nonlabored breathing      Right Knee exam    ROM:      Flexion  degrees       Extension actively 10 degrees, passive grossly full       Range of motion limited by pain, tightness    Patellar Motion:      Minimal Crepitus noted in the patellofemoral joint    Tender:      Some pain with patellar translation    Non Tender:       remainder of knee area    Special Tests:      Some anterior knee pain with Jessie       neg (-) Lachman       neg (-) anterior drawer       neg (-) posterior drawer       neg (-) varus       neg (-) valgus       no pain with forced extension    no effusion  no ecchymosis      Radiology  Visualized radiographs as noted below, and reviewed the images with the patient; if the report was available at the time of the visit, the report was reviewed as well.      Results for orders placed or performed in visit on 09/12/20   XR Knee Right 3 Views    Narrative    KNEE RIGHT THREE VIEWS    9/12/2020 7:55 PM     HISTORY: Right knee pain, unspecified chronicity.    COMPARISON: None.      Impression    IMPRESSION: Joint spaces are preserved. No evidence of acute fracture  or joint effusion. Prominence of bone on the lateral aspect of lateral  femoral condyle with a small 7.5 cm elliptically shaped defect. This  is likely residua from prior trauma.    CLEMENTINA HEATH MD       Assessment:  1. Right knee pain, unspecified chronicity        Plan:  Discussed the assessment with the patient.  Favor patellofemoral source. No acute bony abnormality on x-ray. Some improvement by history.  We discussed the following: symptom treatment, activity modification/rest, imaging, rehab,  potential for improvement with time and support for the affected area. Following discussion, plan:  See patient instructions.  May continue with current brace, and crutches are prn.  We discussed consideration of additional imaging of the affected area, but this is not required currently given assessment and plan for other intervention.  Discussed therapy; she prefers to monitor for now.   Letter provided regarding activities.  Follow up: 1 week if not improving well, or sooner if needed.  Questions answered. Discussed signs and symptoms that may indicate more serious issues; the patient was instructed to seek appropriate care if noted. Letebrhan indicates understanding of these issues and agrees with the plan.        Ronen Mcmahon DO, CAQ      CC: Rudolph Juan          Patient Instructions   We discussed icing, heat, over the counter medication as needed  Continue with brace for comfort, and crutch(es) as needed  Monitor next 1 week  Letter provided today, rest for the next 1 week  Future consideration may include MRI of the knee; contact clinic with update within 1 week, sooner if needed        This note consists of symbols derived from keyboarding, dictation and/or voice recognition software. As a result, there may be errors in the script that have gone undetected. Please consider this when interpreting information found in this chart.

## 2020-09-16 ENCOUNTER — MYC MEDICAL ADVICE (OUTPATIENT)
Dept: ORTHOPEDICS | Facility: CLINIC | Age: 60
End: 2020-09-16

## 2020-09-23 ENCOUNTER — OFFICE VISIT (OUTPATIENT)
Dept: ORTHOPEDICS | Facility: CLINIC | Age: 60
End: 2020-09-23
Payer: COMMERCIAL

## 2020-09-23 VITALS
DIASTOLIC BLOOD PRESSURE: 68 MMHG | BODY MASS INDEX: 26.81 KG/M2 | SYSTOLIC BLOOD PRESSURE: 148 MMHG | HEIGHT: 61 IN | WEIGHT: 142 LBS

## 2020-09-23 DIAGNOSIS — M25.561 RIGHT KNEE PAIN, UNSPECIFIED CHRONICITY: Primary | ICD-10-CM

## 2020-09-23 PROCEDURE — 99213 OFFICE O/P EST LOW 20 MIN: CPT | Performed by: PEDIATRICS

## 2020-09-23 ASSESSMENT — MIFFLIN-ST. JEOR: SCORE: 1146.49

## 2020-09-23 NOTE — LETTER
REPORT OF WORK ABILITY    NOTE TO EMPLOYEE: You must promptly provide a copy of this report to your  employer or worker's compensation insurer, and Qualified Rehabilitation Consultant.    Date: 9/23/2020                     Employee Name: Gerardo Valenzuela         YOB: 1960  Medical Record Number: [id number]   Soc.Sec.No: [id number]  Employer: None                Date of Injury: 9/12/20  Managed Care Organization / Insurance Company Name: UNKNOWN    Diagnosis: right knee injury  Work Related: yes     MMI: NO  Permanent Partial Disability (PPD) likely: UNKNOWN    EMPLOYEE IS ABLE TO WORK: Return to work with restrictions on next scheduled work date. In place through Friday, 10/2/20.     RESTRICTIONS IF ANY:    Stand:  Occasionally (1-3 hours); no longer than 30 minutes consecutively on feet without a break  Sit:  Full time   Walk: Occasionally (1-3 hours); no longer than 30 minutes consecutively on feet without a break  Kneel/[place]:  Not at all (0 hours)  Lift, carry:  Up to 10 lbs  Push/Pull:  Up to 10 lbs  Ladder/Stair climb:  Not at all (0 hours)    OTHER RESTRICTIONS: None    TREATMENT PLAN/NOTES: change work position for comfort, best work height mid chest to mid thigh, may need to restrict work activities for comfort, may continue with knee brace and cold pack for comfort, Elevate and Rest if needed.  Request update from patient in approximately 1 week with status. Depending on course, considerations may include additional imaging with MRI, or possibly physical therapy.          Ronen YOUSIF

## 2020-09-23 NOTE — PATIENT INSTRUCTIONS
Margueritean to follow up with Primary Care provider regarding elevated blood pressure.    With the improving pain, and interest in returning to work, updated letter with restrictions today.  Monitor course next 1 week.  We also discussed considerations of imaging with MRI or possibly physical therapy, depending on course.  Contact clinic or follow up approximately 1 week, sooner if needed.      If you have any further questions for your physician or physician s care team you can call [phone] and use option 3 to leave a voice message. Calls received during business hours will be returned same day.

## 2020-09-23 NOTE — LETTER
9/23/2020         RE: Gerardo Valenzuela  [address] [facility] [id number]        Dear Colleague,    Thank you for referring your patient, Gerardo Valenzuela, to the [facility]. Please see a copy of my visit note below.    Sports Medicine Clinic Visit    PCP: Bibiana Najera    Gerardo Valenzuela is a 60 year old female who is seen in f/u up for Right knee pain, unspecified chronicity. Since last visit on 9/14/2020 patient has had some improvement in her knee.  Feels she is had improvement in her ROM.  Still has pain with walking.  Has been using the knee brace.    She would be interested in returning to work with light duty.       **  Last night had walked, and did have some pain after walking. However, has noted improvement in ROM.  Using brace for support.  No noted noise at joint. Some swelling laterally noted yesterday.    Recall that the initial pop was in patellar area, with pain there. She is now having a little more pain in the proximal to mid lateral lower leg.   No ecchymosis. No additional pop sensation.    No pain at rest currently seated in clinic. Has pain with motion.    Review of Systems  All other systems reviewed and are negative unless noted above.    Past Medical History:   Diagnosis Date     GERD (gastroesophageal reflux disease)      Mixed hyperlipidemia 8/20/2018     Pelvic mass in female 10/1/2019    Added automatically from request for surgery [id number]     Prediabetes 8/20/2018     Past Surgical History:   Procedure Laterality Date     HEMORRHOIDECTOMY  1981    in [place]     LAPAROSCOPIC SALPINGO-OOPHORECTOMY Bilateral 10/7/2019    Procedure: Diagnostic laparoscopy, exploratory laparotomy, Bilateral SALPINGO-OOPHORECTOMY, lysis of adhesions;  Surgeon: Prashant Sawyer MD;  Location: [facility]     Family History   Problem Relation Age of Onset     Cerebrovascular Disease Mother         CVA     Social History     Socioeconomic History     Marital  "status:      Spouse name: Not on file     Number of children: Not on file     Years of education: Not on file     Highest education level: Not on file   Occupational History     Occupation: Housekeeping     Employer: [employer]   Social Needs     Financial resource strain: Not on file     Food insecurity     Worry: Not on file     Inability: Not on file     Transportation needs     Medical: Not on file     Non-medical: Not on file   Tobacco Use     Smoking status: Never Smoker     Smokeless tobacco: Never Used   Substance and Sexual Activity     Alcohol use: No     Drug use: No     Sexual activity: Yes     Partners: Male   Lifestyle     Physical activity     Days per week: Not on file     Minutes per session: Not on file     Stress: Not on file   Relationships     Social connections     Talks on phone: Not on file     Gets together: Not on file     Attends Yazidi service: Not on file     Active member of club or organization: Not on file     Attends meetings of clubs or organizations: Not on file     Relationship status: Not on file     Intimate partner violence     Fear of current or ex partner: Not on file     Emotionally abused: Not on file     Physically abused: Not on file     Forced sexual activity: Not on file   Other Topics Concern     Parent/sibling w/ CABG, MI or angioplasty before 65F 55M? No   Social History Narrative     Not on file         Objective  BP (!) 148/68   Ht 1.549 m (5' 1\")   Wt 64.4 kg (142 lb)   LMP  (LMP Unknown)   BMI 26.83 kg/m      GENERAL APPEARANCE: healthy, alert and no distress   GAIT: antalgic  SKIN: no suspicious lesions or rashes  NEURO: Normal strength and tone, mentation intact and speech normal  PSYCH:  mentation appears normal and affect normal/bright  HEENT: no scleral icterus  CV: distal perfusion intact  RESP: nonlabored breathing      Exam  Right Knee exam    Inspection:   no effusion   no ecchymosis    ROM:      Full active and passive ROM with " flexion and extension    Tender:      lateral joint line       Fibular head  Lateral proximal lower leg, lateral compartment  Compartments supple lower leg    Non Tender:       remainder of knee area    Special Tests:      Mild lateral pain with Jessie       neg (-) Lachman       neg (-) anterior drawer       neg (-) posterior drawer       neg (-) varus       neg (-) valgus       no pain with forced extension        Right ankle with full ROM, no change in lower leg discomfort.      Radiology  None new. See previous notes.    Assessment:  1. Right knee pain, unspecified chronicity        Plan:  Discussed the assessment with the patient and daughter. Improving by history.  Discussed continued monitoring and activity modification, vs additional imaging.  We discussed consideration of additional imaging of the affected area, but this is not required currently given assessment and plan for other intervention.  Reconsider MRI if not getting continued improvement.  Updated workability, plan to return with restrictions, start with ~1 week. If ongoing issues, may reconsider MRI.   Home exercises reviewed today, work on maintaining motion, and quad activation.  Follow up: ~1 week, sooner prn. May contact clinic with update at that time as well.  Questions answered. Discussed signs and symptoms that may indicate more serious issues; the patient was instructed to seek appropriate care if noted. Letebrhan indicates understanding of these issues and agrees with the plan.      Ronen Mcmahon, DO, CAQ        Patient Instructions   Letebrhan to follow up with Primary Care provider regarding elevated blood pressure.    With the improving pain, and interest in returning to work, updated letter with restrictions today.  Monitor course next 1 week.  We also discussed considerations of imaging with MRI or possibly physical therapy, depending on course.  Contact clinic or follow up approximately 1 week, sooner if needed.      If you  have any further questions for your physician or physician s care team you can call [phone] and use option 3 to leave a voice message. Calls received during business hours will be returned same day.              This note consists of symbols derived from keyboarding, dictation and/or voice recognition software. As a result, there may be errors in the script that have gone undetected. Please consider this when interpreting information found in this chart.        Again, thank you for allowing me to participate in the care of your patient.        Sincerely,        Ronen Mcmahon, DO

## 2020-09-23 NOTE — PROGRESS NOTES
Sports Medicine Clinic Visit    PCP: Bibiana Najera JOHN Valenzuela is a 60 year old female who is seen in f/u up for Right knee pain, unspecified chronicity. Since last visit on 9/14/2020 patient has had some improvement in her knee.  Feels she is had improvement in her ROM.  Still has pain with walking.  Has been using the knee brace.    She would be interested in returning to work with light duty.       **  Last night had walked, and did have some pain after walking. However, has noted improvement in ROM.  Using brace for support.  No noted noise at joint. Some swelling laterally noted yesterday.    Recall that the initial pop was in patellar area, with pain there. She is now having a little more pain in the proximal to mid lateral lower leg.   No ecchymosis. No additional pop sensation.    No pain at rest currently seated in clinic. Has pain with motion.    Review of Systems  All other systems reviewed and are negative unless noted above.    Past Medical History:   Diagnosis Date     GERD (gastroesophageal reflux disease)      Mixed hyperlipidemia 8/20/2018     Pelvic mass in female 10/1/2019    Added automatically from request for surgery [id number]     Prediabetes 8/20/2018     Past Surgical History:   Procedure Laterality Date     HEMORRHOIDECTOMY  1981    in [place]     LAPAROSCOPIC SALPINGO-OOPHORECTOMY Bilateral 10/7/2019    Procedure: Diagnostic laparoscopy, exploratory laparotomy, Bilateral SALPINGO-OOPHORECTOMY, lysis of adhesions;  Surgeon: Prashant Sawyer MD;  Location: [facility] OR     Family History   Problem Relation Age of Onset     Cerebrovascular Disease Mother         CVA     Social History     Socioeconomic History     Marital status:      Spouse name: Not on file     Number of children: Not on file     Years of education: Not on file     Highest education level: Not on file   Occupational History     Occupation: Housekeeping     Employer: [employer]      "Financial resource strain: Not on file     Food insecurity     Worry: Not on file     Inability: Not on file     Transportation needs     Medical: Not on file     Non-medical: Not on file   Tobacco Use     Smoking status: Never Smoker     Smokeless tobacco: Never Used   Substance and Sexual Activity     Alcohol use: No     Drug use: No     Sexual activity: Yes     Partners: Male   Lifestyle     Physical activity     Days per week: Not on file     Minutes per session: Not on file     Stress: Not on file   Relationships     Social connections     Talks on phone: Not on file     Gets together: Not on file     Attends Adventism service: Not on file     Active member of club or organization: Not on file     Attends meetings of clubs or organizations: Not on file     Relationship status: Not on file     Intimate partner violence     Fear of current or ex partner: Not on file     Emotionally abused: Not on file     Physically abused: Not on file     Forced sexual activity: Not on file   Other Topics Concern     Parent/sibling w/ CABG, MI or angioplasty before 65F 55M? No   Social History Narrative     Not on file         Objective  BP (!) 148/68   Ht 1.549 m (5' 1\")   Wt 64.4 kg (142 lb)   LMP  (LMP Unknown)   BMI 26.83 kg/m      GENERAL APPEARANCE: healthy, alert and no distress   GAIT: antalgic  SKIN: no suspicious lesions or rashes  NEURO: Normal strength and tone, mentation intact and speech normal  PSYCH:  mentation appears normal and affect normal/bright  HEENT: no scleral icterus  CV: distal perfusion intact  RESP: nonlabored breathing      Exam  Right Knee exam    Inspection:   no effusion   no ecchymosis    ROM:      Full active and passive ROM with flexion and extension    Tender:      lateral joint line       Fibular head  Lateral proximal lower leg, lateral compartment  Compartments supple lower leg    Non Tender:       remainder of knee area    Special Tests:      Mild lateral pain with Jessie       neg " (-) Lachman       neg (-) anterior drawer       neg (-) posterior drawer       neg (-) varus       neg (-) valgus       no pain with forced extension        Right ankle with full ROM, no change in lower leg discomfort.      Radiology  None new. See previous notes.    Assessment:  1. Right knee pain, unspecified chronicity        Plan:  Discussed the assessment with the patient and daughter. Improving by history.  Discussed continued monitoring and activity modification, vs additional imaging.  We discussed consideration of additional imaging of the affected area, but this is not required currently given assessment and plan for other intervention.  Reconsider MRI if not getting continued improvement.  Updated workability, plan to return with restrictions, start with ~1 week. If ongoing issues, may reconsider MRI.   Home exercises reviewed today, work on maintaining motion, and quad activation.  Follow up: ~1 week, sooner prn. May contact clinic with update at that time as well.  Questions answered. Discussed signs and symptoms that may indicate more serious issues; the patient was instructed to seek appropriate care if noted. Letebrhan indicates understanding of these issues and agrees with the plan.      Ronen Mcmahon DO, CAJODY        Patient Instructions   Letebrhan to follow up with Primary Care provider regarding elevated blood pressure.    With the improving pain, and interest in returning to work, updated letter with restrictions today.  Monitor course next 1 week.  We also discussed considerations of imaging with MRI or possibly physical therapy, depending on course.  Contact clinic or follow up approximately 1 week, sooner if needed.      If you have any further questions for your physician or physician s care team you can call [phone] and use option 3 to leave a voice message. Calls received during business hours will be returned same day.              This note consists of symbols derived from  keyboarding, dictation and/or voice recognition software. As a result, there may be errors in the script that have gone undetected. Please consider this when interpreting information found in this chart.

## 2020-10-05 ENCOUNTER — OFFICE VISIT (OUTPATIENT)
Dept: ORTHOPEDICS | Facility: CLINIC | Age: 60
End: 2020-10-05
Payer: COMMERCIAL

## 2020-10-05 VITALS
SYSTOLIC BLOOD PRESSURE: 130 MMHG | HEIGHT: 61 IN | HEART RATE: 88 BPM | WEIGHT: 142 LBS | RESPIRATION RATE: 15 BRPM | DIASTOLIC BLOOD PRESSURE: 76 MMHG | BODY MASS INDEX: 26.81 KG/M2

## 2020-10-05 DIAGNOSIS — M25.561 RIGHT KNEE PAIN, UNSPECIFIED CHRONICITY: Primary | ICD-10-CM

## 2020-10-05 PROCEDURE — 99213 OFFICE O/P EST LOW 20 MIN: CPT | Performed by: PEDIATRICS

## 2020-10-05 ASSESSMENT — MIFFLIN-ST. JEOR: SCORE: 1146.49

## 2020-10-05 NOTE — PROGRESS NOTES
Sports Medicine Clinic Visit    PCP: Bibiana Najera JOHN Valenzuela is a 60 year old female who is seen in f/u up for Right knee pain, unspecified chronicity. Since last visit on 9/23/2020 patient has improvement in her knee pain.  She feels much better.   She has not returned to work yet as they did not have work for her within her restrictions.  She does feel she would be able to increase her activities.   **  Feels around 80%.  Feels like improving well. If using OTC medication, knee feels ok. However, still feels not quite 100%.      Review of Systems  All other systems reviewed and are negative unless noted above.    Past Medical History:   Diagnosis Date     GERD (gastroesophageal reflux disease)      Mixed hyperlipidemia 8/20/2018     Pelvic mass in female 10/1/2019    Added automatically from request for surgery [id number]     Prediabetes 8/20/2018     Past Surgical History:   Procedure Laterality Date     HEMORRHOIDECTOMY  1981    in [place]     LAPAROSCOPIC SALPINGO-OOPHORECTOMY Bilateral 10/7/2019    Procedure: Diagnostic laparoscopy, exploratory laparotomy, Bilateral SALPINGO-OOPHORECTOMY, lysis of adhesions;  Surgeon: Prashant Sawyer MD;  Location:  OR     Family History   Problem Relation Age of Onset     Cerebrovascular Disease Mother         CVA     Social History     Socioeconomic History     Marital status:      Spouse name: Not on file     Number of children: Not on file     Years of education: Not on file     Highest education level: Not on file   Occupational History     Occupation: Housekeeping     Employer: [employer]   Social Needs     Financial resource strain: Not on file     Food insecurity     Worry: Not on file     Inability: Not on file     Transportation needs     Medical: Not on file     Non-medical: Not on file   Tobacco Use     Smoking status: Never Smoker     Smokeless tobacco: Never Used   Substance and Sexual Activity     Alcohol use: No     Drug use:  "No     Sexual activity: Yes     Partners: Male   Lifestyle     Physical activity     Days per week: Not on file     Minutes per session: Not on file     Stress: Not on file   Relationships     Social connections     Talks on phone: Not on file     Gets together: Not on file     Attends [employer]an service: Not on file     Active member of club or organization: Not on file     Attends meetings of clubs or organizations: Not on file     Relationship status: Not on file     Intimate partner violence     Fear of current or ex partner: Not on file     Emotionally abused: Not on file     Physically abused: Not on file     Forced sexual activity: Not on file   Other Topics Concern     Parent/sibling w/ CABG, MI or angioplasty before 65F 55M? No   Social History Narrative     Not on file         Objective  Pulse 88   Resp 15   Ht 1.549 m (5' 1\")   Wt 64.4 kg (142 lb)   LMP  (LMP Unknown)   BMI 26.83 kg/m      GENERAL APPEARANCE: healthy, alert and no distress   GAIT: min antalgic  SKIN: no suspicious lesions or rashes  NEURO: Normal strength and tone, mentation intact and speech normal  PSYCH:  mentation appears normal and affect normal/bright  HEENT: no scleral icterus  CV: distal perfusion intact  RESP: nonlabored breathing      Exam  Right knee:  Full ROM, no pain  No effusion  No pain with circumduction      Radiology  None new today    Assessment:  1. Right knee pain, unspecified chronicity        Plan:  Discussed the assessment with the patient.  Improving by history. See previous notes.  Will advance work activities, though not sure whether she will be able to return with restrictions. Start with advancing activities 1 additional week, and she feels she may be able to return after that time.  See letter.  Follow up: will leave open ended if continuing to do well. Otherwise, in 1-2 weeks if not getting continued improvement, or questions/concerns.  Questions answered. Discussed signs and symptoms that may indicate " more serious issues; the patient was instructed to seek appropriate care if noted. Letebrhan indicates understanding of these issues and agrees with the plan.      Ronen Mcmahon, DO, CAQ        Patient Instructions   Glad to see the knee continues to improve  Updated work letter today; will continue with restrictions one more week. After that, plan to return to work unrestricted, unless you have any concerns.  If doing very well in one week, and continuing to feel better, then follow up is as needed. Otherwise, contact the clinic if any questions/concerns.      If you have any further questions for your physician or physician s care team you can call [phone] and use option 3 to leave a voice message. Calls received during business hours will be returned same day.            This note consists of symbols derived from keyboarding, dictation and/or voice recognition software. As a result, there may be errors in the script that have gone undetected. Please consider this when interpreting information found in this chart.

## 2020-10-05 NOTE — LETTER
REPORT OF WORK ABILITY    NOTE TO EMPLOYEE: You must promptly provide a copy of this report to your  employer or worker's compensation insurer, and Qualified Rehabilitation Consultant.    Date: October 5, 2020                     Employee Name: Gerardo Valenzuela         YOB: 1960  Medical Record Number: [id number]   Soc.Sec.No: [id number]  Employer: None                Date of Injury: 9/12/20  Managed Care Organization / Insurance Company Name: UNKNOWN    Diagnosis: right knee injury, improving  Work Related: yes     MMI: NO  Permanent Partial Disability (PPD) likely: UNKNOWN    EMPLOYEE IS ABLE TO WORK: Return to work with restrictions on next scheduled work date.     RESTRICTIONS IF ANY:    Stand:  Frequently (4-6 hours)  Sit:  Full time is ok  Walk: frequently (4-6 hours)  Kneel/[place]:  Not at all (0 hours)  Lift, carry:  Up to 20 lbs  Push/Pull:  Up to 20 lbs    Above restrictions in place for an additional 1 week (through Monday, 10/12/20). After that time, plan to return to work unrestricted.    OTHER RESTRICTIONS: None    TREATMENT PLAN/NOTES: change work position for comfort, best work height mid chest to mid thigh, may need to restrict work activities for comfort, may continue with knee brace and cold pack for comfort, Elevate and Rest if needed.  If doing well after the next 1 week with the restrictions above, then follow up is as needed.            Ronen NIX.

## 2020-10-05 NOTE — LETTER
10/5/2020         RE: Gerardo Valenzuela  [address] [facility]  [place] [address]        Dear Colleague,    Thank you for referring your patient, Gerardo Valenzuela, to the [facility]. Please see a copy of my visit note below.    Sports Medicine Clinic Visit    PCP: Bibiana Najera    Gerardo Valenzuela is a 60 year old female who is seen in f/u up for Right knee pain, unspecified chronicity. Since last visit on 9/23/2020 patient has improvement in her knee pain.  She feels much better.   She has not returned to work yet as they did not have work for her within her restrictions.  She does feel she would be able to increase her activities.   **  Feels around 80%.  Feels like improving well. If using OTC medication, knee feels ok. However, still feels not quite 100%.      Review of Systems  All other systems reviewed and are negative unless noted above.    Past Medical History:   Diagnosis Date     GERD (gastroesophageal reflux disease)      Mixed hyperlipidemia 8/20/2018     Pelvic mass in female 10/1/2019    Added automatically from request for surgery [id number]     Prediabetes 8/20/2018     Past Surgical History:   Procedure Laterality Date     HEMORRHOIDECTOMY  1981    in [place]     LAPAROSCOPIC SALPINGO-OOPHORECTOMY Bilateral 10/7/2019    Procedure: Diagnostic laparoscopy, exploratory laparotomy, Bilateral SALPINGO-OOPHORECTOMY, lysis of adhesions;  Surgeon: Prashant Sawyer MD;  Location:  OR     Family History   Problem Relation Age of Onset     Cerebrovascular Disease Mother         CVA     Social History     Socioeconomic History     Marital status:      Spouse name: Not on file     Number of children: Not on file     Years of education: Not on file     Highest education level: Not on file   Occupational History     Occupation: Housekeeping     Employer: [employer]   Social Needs     Financial resource strain: Not on file     Food insecurity      "Worry: Not on file     Inability: Not on file     Transportation needs     Medical: Not on file     Non-medical: Not on file   Tobacco Use     Smoking status: Never Smoker     Smokeless tobacco: Never Used   Substance and Sexual Activity     Alcohol use: No     Drug use: No     Sexual activity: Yes     Partners: Male   Lifestyle     Physical activity     Days per week: Not on file     Minutes per session: Not on file     Stress: Not on file   Relationships     Social connections     Talks on phone: Not on file     Gets together: Not on file     Attends Sabianist service: Not on file     Active member of club or organization: Not on file     Attends meetings of clubs or organizations: Not on file     Relationship status: Not on file     Intimate partner violence     Fear of current or ex partner: Not on file     Emotionally abused: Not on file     Physically abused: Not on file     Forced sexual activity: Not on file   Other Topics Concern     Parent/sibling w/ CABG, MI or angioplasty before 65F 55M? No   Social History Narrative     Not on file         Objective  Pulse 88   Resp 15   Ht 1.549 m (5' 1\")   Wt 64.4 kg (142 lb)   LMP  (LMP Unknown)   BMI 26.83 kg/m      GENERAL APPEARANCE: healthy, alert and no distress   GAIT: min antalgic  SKIN: no suspicious lesions or rashes  NEURO: Normal strength and tone, mentation intact and speech normal  PSYCH:  mentation appears normal and affect normal/bright  HEENT: no scleral icterus  CV: distal perfusion intact  RESP: nonlabored breathing      Exam  Right knee:  Full ROM, no pain  No effusion  No pain with circumduction      Radiology  None new today    Assessment:  1. Right knee pain, unspecified chronicity        Plan:  Discussed the assessment with the patient.  Improving by history. See previous notes.  Will advance work activities, though not sure whether she will be able to return with restrictions. Start with advancing activities 1 additional week, and she " feels she may be able to return after that time.  See letter.  Follow up: will leave open ended if continuing to do well. Otherwise, in 1-2 weeks if not getting continued improvement, or questions/concerns.  Questions answered. Discussed signs and symptoms that may indicate more serious issues; the patient was instructed to seek appropriate care if noted. Letebrhan indicates understanding of these issues and agrees with the plan.      Ronen Mcmahon DO, CAQ        Patient Instructions   Glad to see the knee continues to improve  Updated work letter today; will continue with restrictions one more week. After that, plan to return to work unrestricted, unless you have any concerns.  If doing very well in one week, and continuing to feel better, then follow up is as needed. Otherwise, contact the clinic if any questions/concerns.      If you have any further questions for your physician or physician s care team you can call [phone] and use option 3 to leave a voice message. Calls received during business hours will be returned same day.            This note consists of symbols derived from keyboarding, dictation and/or voice recognition software. As a result, there may be errors in the script that have gone undetected. Please consider this when interpreting information found in this chart.          Again, thank you for allowing me to participate in the care of your patient.        Sincerely,        Ronen Mcmahon DO

## 2020-10-14 ENCOUNTER — OFFICE VISIT (OUTPATIENT)
Dept: ORTHOPEDICS | Facility: CLINIC | Age: 60
End: 2020-10-14
Payer: COMMERCIAL

## 2020-10-14 VITALS
WEIGHT: 142 LBS | DIASTOLIC BLOOD PRESSURE: 64 MMHG | HEIGHT: 61 IN | SYSTOLIC BLOOD PRESSURE: 110 MMHG | BODY MASS INDEX: 26.81 KG/M2

## 2020-10-14 DIAGNOSIS — M25.561 RIGHT KNEE PAIN, UNSPECIFIED CHRONICITY: Primary | ICD-10-CM

## 2020-10-14 PROCEDURE — 99214 OFFICE O/P EST MOD 30 MIN: CPT | Performed by: PEDIATRICS

## 2020-10-14 ASSESSMENT — MIFFLIN-ST. JEOR: SCORE: 1146.49

## 2020-10-14 NOTE — LETTER
REPORT OF WORK ABILITY    NOTE TO EMPLOYEE: You must promptly provide a copy of this report to your  employer or worker's compensation insurer, and Qualified Rehabilitation Consultant.    Date: October 14, 2020                    Employee Name: Gerardo Valenzuela         YOB: 1960  Medical Record Number: [id number]   Soc.Sec.No: [id number]  Employer: None                Date of Injury: 9/12/20  Managed Care Organization / Insurance Company Name: UNKNOWN    Diagnosis: right knee injury, increased symptoms with return to work  Work Related: yes     MMI: NO  Permanent Partial Disability (PPD) likely: UNKNOWN    EMPLOYEE IS ABLE TO WORK: Return to work with restrictions on next scheduled work date, with restrictions outlined below.     RESTRICTIONS IF ANY:    Stand:  Frequently (4-6 hours)  Sit:  Full time is ok  Walk: frequently (4-6 hours)  Kneel/[place]:  Not at all (0 hours)  Lift, carry:  Up to 20 lbs  Push/Pull:  Up to 20 lbs      OTHER RESTRICTIONS: None    TREATMENT PLAN/NOTES: change work position for comfort, best work height mid chest to mid thigh, may need to restrict work activities for comfort, may continue with knee brace and cold pack for comfort, Elevate and Rest if needed. Plan MRI of the right knee next.            Ronen YOUSIF

## 2020-10-14 NOTE — LETTER
10/14/2020         RE: Gerardo Vlaenzuela  [address] [place] [id number]        Dear Colleague,    Thank you for referring your patient, Gerardo Valenzuela, to the [facility]. Please see a copy of my visit note below.    Sports Medicine Clinic Visit    PCP: Bibiana Najera    Gerardo Valenzuela is a 60 year old female who is seen in f/u up for Right knee pain, unspecified chronicity. Since last visit on 10/5/2020 patient has had an increase in pain with returning to work.  She did have some pain over the last week while on light duty.  She returned to 8 hours yesterday and increase in pain and swelling in her right knee.  Has been using ibuprofen.     **  Had done light duty for 1 week, then full duty yesterday. Was ok in morning with using acetaminophen yesterday.   Has noted some swelling in knee and proximal lateral lower leg since increased work yesterday.  Some medial knee pain as well now.  Pain with walking.  No noted joint noise.    Light duty there was some pain, but responded well to acetaminophen and some rest, and then felt better. However, yesterday with full duty then more pain again.      Review of Systems  All other systems reviewed and are negative unless noted above.    Past Medical History:   Diagnosis Date     GERD (gastroesophageal reflux disease)      Mixed hyperlipidemia 8/20/2018     Pelvic mass in female 10/1/2019    Added automatically from request for surgery [id number]     Prediabetes 8/20/2018     Past Surgical History:   Procedure Laterality Date     HEMORRHOIDECTOMY  1981    in [place]     LAPAROSCOPIC SALPINGO-OOPHORECTOMY Bilateral 10/7/2019    Procedure: Diagnostic laparoscopy, exploratory laparotomy, Bilateral SALPINGO-OOPHORECTOMY, lysis of adhesions;  Surgeon: Prashant Sawyer MD;  Location: [facility] OR     Family History   Problem Relation Age of Onset     Cerebrovascular Disease Mother         CVA     Social History     Socioeconomic  "History     Marital status:      Spouse name: Not on file     Number of children: Not on file     Years of education: Not on file     Highest education level: Not on file   Occupational History     Occupation: Housekeeping     Employer: [employer]   Social Needs     Financial resource strain: Not on file     Food insecurity     Worry: Not on file     Inability: Not on file     Transportation needs     Medical: Not on file     Non-medical: Not on file   Tobacco Use     Smoking status: Never Smoker     Smokeless tobacco: Never Used   Substance and Sexual Activity     Alcohol use: No     Drug use: No     Sexual activity: Yes     Partners: Male   Lifestyle     Physical activity     Days per week: Not on file     Minutes per session: Not on file     Stress: Not on file   Relationships     Social connections     Talks on phone: Not on file     Gets together: Not on file     Attends Amish service: Not on file     Active member of club or organization: Not on file     Attends meetings of clubs or organizations: Not on file     Relationship status: Not on file     Intimate partner violence     Fear of current or ex partner: Not on file     Emotionally abused: Not on file     Physically abused: Not on file     Forced sexual activity: Not on file   Other Topics Concern     Parent/sibling w/ CABG, MI or angioplasty before 65F 55M? No   Social History Narrative     Not on file         Objective  /64   Ht 1.549 m (5' 1\")   Wt 64.4 kg (142 lb)   LMP  (LMP Unknown)   BMI 26.83 kg/m      GENERAL APPEARANCE: healthy, alert and no distress   GAIT: antalgic  SKIN: no suspicious lesions or rashes  NEURO: Normal strength and tone, mentation intact and speech normal  PSYCH:  mentation appears normal and affect normal/bright  HEENT: no scleral icterus  CV: distal perfusion intact  RESP: nonlabored breathing      Exam  Right Knee exam    Inspection:   trace effusion   no ecchymosis    ROM: pain end range " flexion, extension    Tender:      medial joint line       lateral joint line mild    Special Tests:      Pain with Jessie       neg (-) Lachman       neg (-) anterior drawer       neg (-) posterior drawer       neg (-) varus       neg (-) valgus       + pain with forced extension      Radiology  None new. See previous notes.    Assessment:  1. Right knee pain, unspecified chronicity        Plan:  Discussed the assessment with the patient.  Had been improving, then increased pain with return to more work.  Will return to restricted work next.  Given ongoing symptoms, we also discussed consideration of MRI. She is interested. Will obtain MRI knee as well.  In the meantime, icing, activity modification. Updated letter provided.  Follow up: contact pt with MRI results.  Questions answered. Discussed signs and symptoms that may indicate more serious issues; the patient was instructed to seek appropriate care if noted. Gerardo indicates understanding of these issues and agrees with the plan.      Ronen Mcmahon, DO, CAQ            Patient Instructions   With the increased pain in the knee with return to work, plan MRI of the knee next.  Updated letter for work.  Will plan to contact you with MRI results.         Advanced imaging is done by appointment. Some insurance require a prior authorization to be completed which may delay the time until you are able to schedule your appointment. Please call [facility]: [phone] to schedule your MRI.  Depending on your availability you can usually schedule within the next 1-2 days.    The clinic will call you with results, if you have not heard from the clinic within 3-4 days following your MRI please contact us at the number listed below.     If you have any further questions for your physician or physician s care team you can call [phone] and use option 3 to leave a voice message. Calls received during business hours will be returned same  day.                This note consists of symbols derived from keyboarding, dictation and/or voice recognition software. As a result, there may be errors in the script that have gone undetected. Please consider this when interpreting information found in this chart.          Again, thank you for allowing me to participate in the care of your patient.        Sincerely,        Ronen Mcmahon, DO

## 2020-10-14 NOTE — PATIENT INSTRUCTIONS
With the increased pain in the knee with return to work, plan MRI of the knee next.  Updated letter for work.  Will plan to contact you with MRI results.         Advanced imaging is done by appointment. Some insurance require a prior authorization to be completed which may delay the time until you are able to schedule your appointment. Please call [facility]: [phone] to schedule your MRI.  Depending on your availability you can usually schedule within the next 1-2 days.    The clinic will call you with results, if you have not heard from the clinic within 3-4 days following your MRI please contact us at the number listed below.     If you have any further questions for your physician or physician s care team you can call [phone] and use option 3 to leave a voice message. Calls received during business hours will be returned same day.

## 2020-10-14 NOTE — PROGRESS NOTES
Sports Medicine Clinic Visit    PCP: Bibiana Najera JOHN Valenzuela is a 60 year old female who is seen in f/u up for Right knee pain, unspecified chronicity. Since last visit on 10/5/2020 patient has had an increase in pain with returning to work.  She did have some pain over the last week while on light duty.  She returned to 8 hours yesterday and increase in pain and swelling in her right knee.  Has been using ibuprofen.     **  Had done light duty for 1 week, then full duty yesterday. Was ok in morning with using acetaminophen yesterday.   Has noted some swelling in knee and proximal lateral lower leg since increased work yesterday.  Some medial knee pain as well now.  Pain with walking.  No noted joint noise.    Light duty there was some pain, but responded well to acetaminophen and some rest, and then felt better. However, yesterday with full duty then more pain again.      Review of Systems  All other systems reviewed and are negative unless noted above.    Past Medical History:   Diagnosis Date     GERD (gastroesophageal reflux disease)      Mixed hyperlipidemia 8/20/2018     Pelvic mass in female 10/1/2019    Added automatically from request for surgery [id number]     Prediabetes 8/20/2018     Past Surgical History:   Procedure Laterality Date     HEMORRHOIDECTOMY  1981    in [place]     LAPAROSCOPIC SALPINGO-OOPHORECTOMY Bilateral 10/7/2019    Procedure: Diagnostic laparoscopy, exploratory laparotomy, Bilateral SALPINGO-OOPHORECTOMY, lysis of adhesions;  Surgeon: Prahsant Sawyer MD;  Location: [facility] OR     Family History   Problem Relation Age of Onset     Cerebrovascular Disease Mother         CVA     Social History     Socioeconomic History     Marital status:      Spouse name: Not on file     Number of children: Not on file     Years of education: Not on file     Highest education level: Not on file   Occupational History     Occupation: Housekeeping     Employer: [employer]  "  Social Needs     Financial resource strain: Not on file     Food insecurity     Worry: Not on file     Inability: Not on file     Transportation needs     Medical: Not on file     Non-medical: Not on file   Tobacco Use     Smoking status: Never Smoker     Smokeless tobacco: Never Used   Substance and Sexual Activity     Alcohol use: No     Drug use: No     Sexual activity: Yes     Partners: Male   Lifestyle     Physical activity     Days per week: Not on file     Minutes per session: Not on file     Stress: Not on file   Relationships     Social connections     Talks on phone: Not on file     Gets together: Not on file     Attends Pentecostal service: Not on file     Active member of club or organization: Not on file     Attends meetings of clubs or organizations: Not on file     Relationship status: Not on file     Intimate partner violence     Fear of current or ex partner: Not on file     Emotionally abused: Not on file     Physically abused: Not on file     Forced sexual activity: Not on file   Other Topics Concern     Parent/sibling w/ CABG, MI or angioplasty before 65F 55M? No   Social History Narrative     Not on file         Objective  /64   Ht 1.549 m (5' 1\")   Wt 64.4 kg (142 lb)   LMP  (LMP Unknown)   BMI 26.83 kg/m      GENERAL APPEARANCE: healthy, alert and no distress   GAIT: antalgic  SKIN: no suspicious lesions or rashes  NEURO: Normal strength and tone, mentation intact and speech normal  PSYCH:  mentation appears normal and affect normal/bright  HEENT: no scleral icterus  CV: distal perfusion intact  RESP: nonlabored breathing      Exam  Right Knee exam    Inspection:   trace effusion   no ecchymosis    ROM: pain end range flexion, extension    Tender:      medial joint line       lateral joint line mild    Special Tests:      Pain with Jessie       neg (-) Lachman       neg (-) anterior drawer       neg (-) posterior drawer       neg (-) varus       neg (-) valgus       + pain with " forced extension      Radiology  None new. See previous notes.    Assessment:  1. Right knee pain, unspecified chronicity        Plan:  Discussed the assessment with the patient.  Had been improving, then increased pain with return to more work.  Will return to restricted work next.  Given ongoing symptoms, we also discussed consideration of MRI. She is interested. Will obtain MRI knee as well.  In the meantime, icing, activity modification. Updated letter provided.  Follow up: contact pt with MRI results.  Questions answered. Discussed signs and symptoms that may indicate more serious issues; the patient was instructed to seek appropriate care if noted. Letebrhan indicates understanding of these issues and agrees with the plan.      Ronen Mcmahon, DO, CAQ            Patient Instructions   With the increased pain in the knee with return to work, plan MRI of the knee next.  Updated letter for work.  Will plan to contact you with MRI results.         Advanced imaging is done by appointment. Some insurance require a prior authorization to be completed which may delay the time until you are able to schedule your appointment. Please call [facility]: [phone] to schedule your MRI.  Depending on your availability you can usually schedule within the next 1-2 days.    The clinic will call you with results, if you have not heard from the clinic within 3-4 days following your MRI please contact us at the number listed below.     If you have any further questions for your physician or physician s care team you can call [phone] and use option 3 to leave a voice message. Calls received during business hours will be returned same day.                This note consists of symbols derived from keyboarding, dictation and/or voice recognition software. As a result, there may be errors in the script that have gone undetected. Please consider this when interpreting information found in this  chart.

## 2020-10-23 ENCOUNTER — TELEPHONE (OUTPATIENT)
Dept: ORTHOPEDICS | Facility: CLINIC | Age: 60
End: 2020-10-23

## 2020-10-23 ENCOUNTER — ANCILLARY PROCEDURE (OUTPATIENT)
Dept: MRI IMAGING | Facility: CLINIC | Age: 60
End: 2020-10-23
Attending: PEDIATRICS
Payer: COMMERCIAL

## 2020-10-23 DIAGNOSIS — M25.561 RIGHT KNEE PAIN, UNSPECIFIED CHRONICITY: ICD-10-CM

## 2020-10-23 PROCEDURE — 73721 MRI JNT OF LWR EXTRE W/O DYE: CPT | Mod: RT

## 2020-10-23 NOTE — TELEPHONE ENCOUNTER
Results for orders placed or performed in visit on 10/23/20   MR Knee Right w/o Contrast    Narrative    MR right knee without contrast 10/23/2020 10:46 AM    Techniques: Multiplanar multisequence imaging of the right knee was  obtained without administration of intra-articular or intravenous  contrast using routing protocol.    History: evaluate ongoing knee pain after injury, medial joint line  pain and tenderness; Right knee pain, unspecified chronicity    Additional History from EMR: None    Comparison: Radiograph the right knee 9/12/2020    Findings:    MENISCI:  Medial meniscus: Avulsion of the posterior root ligament. There is  intrasubstance T2 signal within the posterior horn and extending  slightly into the body of the meniscus without distinct articular  surface violation. Small parameniscal cysts adjacent to the posterior  root tear.   Lateral meniscus: Intact.    LIGAMENTS  Cruciate ligaments: Intact.  Medial supporting structures: Intact. Bowing of the tibial collateral  ligament secondary to peripheral extrusion of the meniscal body.  Moderate amount of fluid in the pes anserine bursa and small fluid in  the semimembranosus bursa.    Lateral supporting structures: Intact.    EXTENSOR MECHANISM  Intact.    FLUID  Small joint effusion and trace Baker's cyst.    OSSEOUS and ARTICULAR STRUCTURES  Bones: Edema like marrow signal in the medial tibial plateau with  hypointense line, consistent with insufficiency fracture. There is a  lesser extent edema-like marrow signal intensity at the peripheral  medial femoral condyle, likely reactive to meniscal pathology.     Otherwise, no fracture or marrow infiltration.    Likely intraosseous ganglion cyst with posterior crucial ligament  distal attachment.    Patellofemoral compartment: Full-thickness chondral fissuring of the  superior median ridge of the patella with underlying subchondral  edema-like signal and cystlike change. Full-thickness chondral  fissure  with subchondral cystic change in the inferior medial trochlea.    Medial compartment: High-grade chondral loss of the medial  compartment.    Lateral compartment: No high-grade hyaline cartilage disease.    ANCILLARY FINDINGS  None.      Impression    Impression:  1. Avulsion of the medial meniscus posterior root ligament with medial  tibial plateau insufficiency fracture.  2. 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  3. Pes anserine bursitis, may be reactive to meniscal pathology.    I have personally reviewed the examination and initial interpretation  and I agree with the findings.    SEN MCCRAY

## 2020-10-25 NOTE — TELEPHONE ENCOUNTER
"Findings consistent with insufficiency fracture medial tibial plateau. Adjacent fluid signal, indicating this area is inflamed. There is also prominent cartilage thinning in the medial and patellofemoral compartments (\"arthritis\").  Arthritis predated the injury, but fracture is likely culprit currently.  Would have her rest from WB on right knee. Advise crutches or other ambulatory aid. May use icing, OTC meds prn. Would also update letter for work, indicating no standing or walking while working; other restrictions remain the same.  Would start with 3 weeks of limiting WB as much as possible, then recheck, with repeat x-ray (2 views, AP and lateral, not weightbearing).  I would be happy to have a visit with the patient (in person, by video, or by phone) to discuss further if that would be helpful.  Thanks.  Ronen Mcmahon, , CAQ    " abdominal pain

## 2020-10-27 NOTE — TELEPHONE ENCOUNTER
Called and spoke with patient.  Relyaed results.  She does have access to crutches.  Discussed how to limit weight on her right leg.  She would like to follow up in clinic to discuss full results as well as work restrictions.   Appointment made for 10/28/20  Sophy Campoverde MS ATC

## 2020-10-28 ENCOUNTER — OFFICE VISIT (OUTPATIENT)
Dept: ORTHOPEDICS | Facility: CLINIC | Age: 60
End: 2020-10-28
Payer: COMMERCIAL

## 2020-10-28 VITALS — BODY MASS INDEX: 26.81 KG/M2 | WEIGHT: 142 LBS | HEIGHT: 61 IN

## 2020-10-28 DIAGNOSIS — M25.561 RIGHT KNEE PAIN, UNSPECIFIED CHRONICITY: Primary | ICD-10-CM

## 2020-10-28 DIAGNOSIS — M84.469A INSUFFICIENCY FRACTURE OF TIBIA, INITIAL ENCOUNTER: ICD-10-CM

## 2020-10-28 DIAGNOSIS — S83.8X1A ACUTE MEDIAL MENISCAL INJURY OF RIGHT KNEE, INITIAL ENCOUNTER: ICD-10-CM

## 2020-10-28 PROCEDURE — 99213 OFFICE O/P EST LOW 20 MIN: CPT | Performed by: PEDIATRICS

## 2020-10-28 ASSESSMENT — MIFFLIN-ST. JEOR: SCORE: 1146.49

## 2020-10-28 NOTE — LETTER
10/28/2020         RE: Gerardo Valenzuela  [address] [place] [address]  [place] [id number]        Dear Colleague,    Thank you for referring your patient, Gerardo Valenzuela, to the [facility]. Please see a copy of my visit note below.    Sports Medicine Clinic Visit    PCP: Bibiana Najera    Gerardo Valenzuela is a 60 year old female who is seen in f/u up for Right knee pain, unspecified chronicity. Since last visit on 10/14/2020 patient has undergone an MRI, she is here today to discuss results. She notes persisting pain. Currently using crutches with toe touch weight bearing.   Presents today with daughter.    **        Review of Systems  All other systems reviewed and are negative unless noted above.    Past Medical History:   Diagnosis Date     GERD (gastroesophageal reflux disease)      Mixed hyperlipidemia 8/20/2018     Pelvic mass in female 10/1/2019    Added automatically from request for surgery [id number]     Prediabetes 8/20/2018     Past Surgical History:   Procedure Laterality Date     HEMORRHOIDECTOMY  1981    in [place]     LAPAROSCOPIC SALPINGO-OOPHORECTOMY Bilateral 10/7/2019    Procedure: Diagnostic laparoscopy, exploratory laparotomy, Bilateral SALPINGO-OOPHORECTOMY, lysis of adhesions;  Surgeon: Prashant Sawyer MD;  Location:  OR     Family History   Problem Relation Age of Onset     Cerebrovascular Disease Mother         CVA     Social History     Socioeconomic History     Marital status:      Spouse name: Not on file     Number of children: Not on file     Years of education: Not on file     Highest education level: Not on file   Occupational History     Occupation: Housekeeping     Employer: [employer]   Social [employer]     Financial resource strain: Not on file     Food insecurity     Worry: Not on file     Inability: Not on file     Transportation needs     Medical: Not on file     Non-medical: Not on file   Tobacco Use     Smoking status:  "Never Smoker     Smokeless tobacco: Never Used   Substance and Sexual Activity     Alcohol use: No     Drug use: No     Sexual activity: Yes     Partners: Male   Lifestyle     Physical activity     Days per week: Not on file     Minutes per session: Not on file     Stress: Not on file   Relationships     Social connections     Talks on phone: Not on file     Gets together: Not on file     Attends Hoahaoism service: Not on file     Active member of club or organization: Not on file     Attends meetings of clubs or organizations: Not on file     Relationship status: Not on file     Intimate partner violence     Fear of current or ex partner: Not on file     Emotionally abused: Not on file     Physically abused: Not on file     Forced sexual activity: Not on file   Other Topics Concern     Parent/sibling w/ CABG, MI or angioplasty before 65F 55M? No   Social History Narrative     Not on file         Objective  Ht 1.549 m (5' 1\")   Wt 64.4 kg (142 lb)   LMP  (LMP Unknown)   BMI 26.83 kg/m      GENERAL APPEARANCE: healthy, alert and no distress   GAIT: antalgic  SKIN: no suspicious lesions or rashes  NEURO: Normal strength and tone, mentation intact and speech normal  PSYCH:  mentation appears normal and affect normal/bright  HEENT: no scleral icterus  CV: distal perfusion intact  RESP: nonlabored breathing      Exam  Right knee:  Effusion present.  Medial joint line tenderness.  Some stiffness at end range of motion      Radiology  Visualized the MRI study noted below, and reviewed the images and the report with the patient.      Results for orders placed or performed in visit on 10/23/20   MR Knee Right w/o Contrast    Narrative    MR right knee without contrast 10/23/2020 10:46 AM    Techniques: Multiplanar multisequence imaging of the right knee was  obtained without administration of intra-articular or intravenous  contrast using routing protocol.    History: evaluate ongoing knee pain after injury, medial joint " line  pain and tenderness; Right knee pain, unspecified chronicity    Additional History from EMR: None    Comparison: Radiograph the right knee 9/12/2020    Findings:    MENISCI:  Medial meniscus: Avulsion of the posterior root ligament. There is  intrasubstance T2 signal within the posterior horn and extending  slightly into the body of the meniscus without distinct articular  surface violation. Small parameniscal cysts adjacent to the posterior  root tear.   Lateral meniscus: Intact.    LIGAMENTS  Cruciate ligaments: Intact.  Medial supporting structures: Intact. Bowing of the tibial collateral  ligament secondary to peripheral extrusion of the meniscal body.  Moderate amount of fluid in the pes anserine bursa and small fluid in  the semimembranosus bursa.    Lateral supporting structures: Intact.    EXTENSOR MECHANISM  Intact.    FLUID  Small joint effusion and trace Baker's cyst.    OSSEOUS and ARTICULAR STRUCTURES  Bones: Edema like marrow signal in the medial tibial plateau with  hypointense line, consistent with insufficiency fracture. There is a  lesser extent edema-like marrow signal intensity at the peripheral  medial femoral condyle, likely reactive to meniscal pathology.     Otherwise, no fracture or marrow infiltration.    Likely intraosseous ganglion cyst with posterior crucial ligament  distal attachment.    Patellofemoral compartment: Full-thickness chondral fissuring of the  superior median ridge of the patella with underlying subchondral  edema-like signal and cystlike change. Full-thickness chondral fissure  with subchondral cystic change in the inferior medial trochlea.    Medial compartment: High-grade chondral loss of the medial  compartment.    Lateral compartment: No high-grade hyaline cartilage disease.    ANCILLARY FINDINGS  None.      Impression    Impression:  1. Avulsion of the medial meniscus posterior root ligament with medial  tibial plateau insufficiency fracture.  2. 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  3. Pes anserine bursitis, may be reactive to meniscal pathology.    I have personally reviewed the examination and initial interpretation  and I agree with the findings.    SEN MCCRAY       Assessment:  1. Right knee pain, unspecified chronicity    2. Insufficiency fracture of tibia, initial encounter    3. Acute medial meniscal injury of right knee, initial encounter        Plan:  Discussed the assessment with the patient.  It is possible that the meniscal avulsion was the acute pop that she had, though she does also have an insufficiency fracture present.  We discussed considerations of 1) nonweightbearing, symptom management, and additional monitoring with time, with focus more on the insufficiency fracture with x-ray follow-up in approximately 2 weeks (this is more to focus on the fracture, rather than the meniscus finding); 2) referral to orthopedic surgeon, given the meniscus injury.  Following discussion of options, we will proceed with #1; she also does have some joint arthrosis.  Advise routine icing, elevation as needed, and use of crutches for ambulation.  Updated letter regarding work. Will keep her off work at this time, given demands of her job.  Follow up: 2 weeks, repeat x-ray of the knee (AP and lateral, nonweightbearing).  If monitoring and focusing on the fracture, and she continues to have knee pain, particularly worse any mechanical symptoms, then ultimately may refer to orthopedic surgeon.  Questions answered. Discussed signs and symptoms that may indicate more serious issues; the patient was instructed to seek appropriate care if noted. Letebrhan indicates understanding of these issues and agrees with the plan.      Ronen Mcmahon, DO, CAQ            Patient Instructions   Crutches routinely for getting around; avoid weightbearing through the right leg  Updated letter for work;  start with 2 weeks off  Recheck in 2 weeks        This note consists of symbols derived from keyboarding, dictation and/or voice recognition software. As a result, there may be errors in the script that have gone undetected. Please consider this when interpreting information found in this chart.          Again, thank you for allowing me to participate in the care of your patient.        Sincerely,        Ronen Mcmahon, DO

## 2020-10-28 NOTE — PROGRESS NOTES
Sports Medicine Clinic Visit    PCP: Bibiana Najera JOHN Valenzuela is a 60 year old female who is seen in f/u up for Right knee pain, unspecified chronicity. Since last visit on 10/14/2020 patient has undergone an MRI, she is here today to discuss results. She notes persisting pain. Currently using crutches with toe touch weight bearing.   Presents today with daughter.    **        Review of Systems  All other systems reviewed and are negative unless noted above.    Past Medical History:   Diagnosis Date     GERD (gastroesophageal reflux disease)      Mixed hyperlipidemia 8/20/2018     Pelvic mass in female 10/1/2019    Added automatically from request for surgery [id number]     Prediabetes 8/20/2018     Past Surgical History:   Procedure Laterality Date     HEMORRHOIDECTOMY  1981    in [place]     LAPAROSCOPIC SALPINGO-OOPHORECTOMY Bilateral 10/7/2019    Procedure: Diagnostic laparoscopy, exploratory laparotomy, Bilateral SALPINGO-OOPHORECTOMY, lysis of adhesions;  Surgeon: Prashant Sawyer MD;  Location:  OR     Family History   Problem Relation Age of Onset     Cerebrovascular Disease Mother         CVA     Social History     Socioeconomic History     Marital status:      Spouse name: Not on file     Number of children: Not on file     Years of education: Not on file     Highest education level: Not on file   Occupational History     Occupation: Housekeeping     Employer: [employer]   Social Needs     Financial resource strain: Not on file     Food insecurity     Worry: Not on file     Inability: Not on file     Transportation needs     Medical: Not on file     Non-medical: Not on file   Tobacco Use     Smoking status: Never Smoker     Smokeless tobacco: Never Used   Substance and Sexual Activity     Alcohol use: No     Drug use: No     Sexual activity: Yes     Partners: Male   Lifestyle     Physical activity     Days per week: Not on file     Minutes per session: Not on file      "Stress: Not on file   Relationships     Social connections     Talks on phone: Not on file     Gets together: Not on file     Attends Taoism service: Not on file     Active member of club or organization: Not on file     Attends meetings of clubs or organizations: Not on file     Relationship status: Not on file     Intimate partner violence     Fear of current or ex partner: Not on file     Emotionally abused: Not on file     Physically abused: Not on file     Forced sexual activity: Not on file   Other Topics Concern     Parent/sibling w/ CABG, MI or angioplasty before 65F 55M? No   Social History Narrative     Not on file         Objective  Ht 1.549 m (5' 1\")   Wt 64.4 kg (142 lb)   LMP  (LMP Unknown)   BMI 26.83 kg/m      GENERAL APPEARANCE: healthy, alert and no distress   GAIT: antalgic  SKIN: no suspicious lesions or rashes  NEURO: Normal strength and tone, mentation intact and speech normal  PSYCH:  mentation appears normal and affect normal/bright  HEENT: no scleral icterus  CV: distal perfusion intact  RESP: nonlabored breathing      Exam  Right knee:  Effusion present.  Medial joint line tenderness.  Some stiffness at end range of motion      Radiology  Visualized the MRI study noted below, and reviewed the images and the report with the patient.      Results for orders placed or performed in visit on 10/23/20   MR Knee Right w/o Contrast    Narrative    MR right knee without contrast 10/23/2020 10:46 AM    Techniques: Multiplanar multisequence imaging of the right knee was  obtained without administration of intra-articular or intravenous  contrast using routing protocol.    History: evaluate ongoing knee pain after injury, medial joint line  pain and tenderness; Right knee pain, unspecified chronicity    Additional History from EMR: None    Comparison: Radiograph the right knee 9/12/2020    Findings:    MENISCI:  Medial meniscus: Avulsion of the posterior root ligament. There is  intrasubstance T2 " signal within the posterior horn and extending  slightly into the body of the meniscus without distinct articular  surface violation. Small parameniscal cysts adjacent to the posterior  root tear.   Lateral meniscus: Intact.    LIGAMENTS  Cruciate ligaments: Intact.  Medial supporting structures: Intact. Bowing of the tibial collateral  ligament secondary to peripheral extrusion of the meniscal body.  Moderate amount of fluid in the pes anserine bursa and small fluid in  the semimembranosus bursa.    Lateral supporting structures: Intact.    EXTENSOR MECHANISM  Intact.    FLUID  Small joint effusion and trace Baker's cyst.    OSSEOUS and ARTICULAR STRUCTURES  Bones: Edema like marrow signal in the medial tibial plateau with  hypointense line, consistent with insufficiency fracture. There is a  lesser extent edema-like marrow signal intensity at the peripheral  medial femoral condyle, likely reactive to meniscal pathology.     Otherwise, no fracture or marrow infiltration.    Likely intraosseous ganglion cyst with posterior crucial ligament  distal attachment.    Patellofemoral compartment: Full-thickness chondral fissuring of the  superior median ridge of the patella with underlying subchondral  edema-like signal and cystlike change. Full-thickness chondral fissure  with subchondral cystic change in the inferior medial trochlea.    Medial compartment: High-grade chondral loss of the medial  compartment.    Lateral compartment: No high-grade hyaline cartilage disease.    ANCILLARY FINDINGS  None.      Impression    Impression:  1. Avulsion of the medial meniscus posterior root ligament with medial  tibial plateau insufficiency fracture.  2. 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  3. Pes anserine bursitis, may be reactive to meniscal pathology.    I have personally reviewed the examination and initial interpretation  and I agree with the  findings.    SEN MCCRAY       Assessment:  1. Right knee pain, unspecified chronicity    2. Insufficiency fracture of tibia, initial encounter    3. Acute medial meniscal injury of right knee, initial encounter        Plan:  Discussed the assessment with the patient.  It is possible that the meniscal avulsion was the acute pop that she had, though she does also have an insufficiency fracture present.  We discussed considerations of 1) nonweightbearing, symptom management, and additional monitoring with time, with focus more on the insufficiency fracture with x-ray follow-up in approximately 2 weeks (this is more to focus on the fracture, rather than the meniscus finding); 2) referral to orthopedic surgeon, given the meniscus injury.  Following discussion of options, we will proceed with #1; she also does have some joint arthrosis.  Advise routine icing, elevation as needed, and use of crutches for ambulation.  Updated letter regarding work. Will keep her off work at this time, given demands of her job.  Follow up: 2 weeks, repeat x-ray of the knee (AP and lateral, nonweightbearing).  If monitoring and focusing on the fracture, and she continues to have knee pain, particularly worse any mechanical symptoms, then ultimately may refer to orthopedic surgeon.  Questions answered. Discussed signs and symptoms that may indicate more serious issues; the patient was instructed to seek appropriate care if noted. Letebrhan indicates understanding of these issues and agrees with the plan.      Ronen Mcmahon, DO, CAQ            Patient Instructions   Crutches routinely for getting around; avoid weightbearing through the right leg  Updated letter for work; start with 2 weeks off  Recheck in 2 weeks        This note consists of symbols derived from keyboarding, dictation and/or voice recognition software. As a result, there may be errors in the script that have gone undetected. Please consider this when interpreting  information found in this chart.

## 2020-10-28 NOTE — PATIENT INSTRUCTIONS
Crutches routinely for getting around; avoid weightbearing through the right leg  Updated letter for work; start with 2 weeks off  Recheck in 2 weeks

## 2020-10-28 NOTE — LETTER
REPORT OF WORK ABILITY    NOTE TO EMPLOYEE: You must promptly provide a copy of this report to your  employer or worker's compensation insurer, and Qualified Rehabilitation Consultant.    Date: October 28, 2020                    Employee Name: Gerardo Valenzuela         YOB: 1960  Medical Record Number: [id number]   Soc.Sec.No: [id number]  Employer: None                Date of Injury: 9/12/20  Managed Care Organization / Insurance Company Name: UNKNOWN    Diagnosis: right knee injury, increased symptoms with return to work; tibial plateau insufficiency fracture, meniscal root injury  Work Related: yes     MMI: NO  Permanent Partial Disability (PPD) likely: UNKNOWN    EMPLOYEE IS ABLE TO WORK: remain off work at this time     RESTRICTIONS IF ANY:        OTHER RESTRICTIONS: None    TREATMENT PLAN/NOTES: routine use of crutches, remain nonweightbearing. Plan recheck 2 weeks to assess status, fracture healing.            Ronen YOUSIF

## 2020-11-11 ENCOUNTER — OFFICE VISIT (OUTPATIENT)
Dept: ORTHOPEDICS | Facility: CLINIC | Age: 60
End: 2020-11-11
Payer: COMMERCIAL

## 2020-11-11 ENCOUNTER — ANCILLARY PROCEDURE (OUTPATIENT)
Dept: GENERAL RADIOLOGY | Facility: CLINIC | Age: 60
End: 2020-11-11
Attending: PEDIATRICS
Payer: COMMERCIAL

## 2020-11-11 VITALS
DIASTOLIC BLOOD PRESSURE: 62 MMHG | SYSTOLIC BLOOD PRESSURE: 124 MMHG | WEIGHT: 142 LBS | BODY MASS INDEX: 26.81 KG/M2 | HEIGHT: 61 IN

## 2020-11-11 DIAGNOSIS — M25.561 RIGHT KNEE PAIN, UNSPECIFIED CHRONICITY: ICD-10-CM

## 2020-11-11 DIAGNOSIS — M84.469D INSUFFICIENCY FRACTURE OF TIBIA WITH ROUTINE HEALING, SUBSEQUENT ENCOUNTER: Primary | ICD-10-CM

## 2020-11-11 DIAGNOSIS — S83.8X1D ACUTE MEDIAL MENISCAL INJURY OF RIGHT KNEE, SUBSEQUENT ENCOUNTER: ICD-10-CM

## 2020-11-11 DIAGNOSIS — M84.469D INSUFFICIENCY FRACTURE OF TIBIA WITH ROUTINE HEALING, SUBSEQUENT ENCOUNTER: ICD-10-CM

## 2020-11-11 PROCEDURE — 99213 OFFICE O/P EST LOW 20 MIN: CPT | Performed by: PEDIATRICS

## 2020-11-11 PROCEDURE — 73560 X-RAY EXAM OF KNEE 1 OR 2: CPT | Mod: TC

## 2020-11-11 ASSESSMENT — MIFFLIN-ST. JEOR: SCORE: 1146.49

## 2020-11-11 NOTE — PROGRESS NOTES
Sports Medicine Clinic Visit    PCP: Bibiana Najera JOHN Valenzuela is a 60 year old female who is seen in f/u up for    Insufficiency fracture of tibia with routine healing, subsequent encounter  Right knee pain, unspecified chronicity  Acute medial meniscal injury of right knee, subsequent encounter. Since last visit on 10/28/2020 patient has continued to be as NWB as possible with the use of the crutches.  She does feel like her knee is improving.  Feels there is less swelling.  She has remained off of work.    **  Notes some pain with sit to stand, tightness, some pain.     Notes burning pain after sitting down from being on feet.  Also notes something feels like is shifting in knee.      Review of Systems  All other systems reviewed and are negative unless noted above.    Past Medical History:   Diagnosis Date     GERD (gastroesophageal reflux disease)      Mixed hyperlipidemia 8/20/2018     Pelvic mass in female 10/1/2019    Added automatically from request for surgery [id number]     Prediabetes 8/20/2018     Past Surgical History:   Procedure Laterality Date     HEMORRHOIDECTOMY  1981    in [place]     LAPAROSCOPIC SALPINGO-OOPHORECTOMY Bilateral 10/7/2019    Procedure: Diagnostic laparoscopy, exploratory laparotomy, Bilateral SALPINGO-OOPHORECTOMY, lysis of adhesions;  Surgeon: Prashant Sawyer MD;  Location: [facility] OR     Family History   Problem Relation Age of Onset     Cerebrovascular Disease Mother         CVA     Social History     Socioeconomic History     Marital status:      Spouse name: Not on file     Number of children: Not on file     Years of education: Not on file     Highest education level: Not on file   Occupational History     Occupation: Housekeeping     Employer: [employer]   Social Needs     Financial resource strain: Not on file     Food insecurity     Worry: Not on file     Inability: Not on file     Transportation needs     Medical: Not on file     Non-medical:  "Not on file   Tobacco Use     Smoking status: Never Smoker     Smokeless tobacco: Never Used   Substance and Sexual Activity     Alcohol use: No     Drug use: No     Sexual activity: Yes     Partners: Male   Lifestyle     Physical activity     Days per week: Not on file     Minutes per session: Not on file     Stress: Not on file   Relationships     Social connections     Talks on phone: Not on file     Gets together: Not on file     Attends Confucianism service: Not on file     Active member of club or organization: Not on file     Attends meetings of clubs or organizations: Not on file     Relationship status: Not on file     Intimate partner violence     Fear of current or ex partner: Not on file     Emotionally abused: Not on file     Physically abused: Not on file     Forced sexual activity: Not on file   Other Topics Concern     Parent/sibling w/ CABG, MI or angioplasty before 65F 55M? No   Social History Narrative     Not on file         Objective  /62   Ht 1.549 m (5' 1\")   Wt 64.4 kg (142 lb)   LMP  (LMP Unknown)   BMI 26.83 kg/m      GENERAL APPEARANCE: healthy, alert and no distress   GAIT: crutches  SKIN: no suspicious lesions or rashes  NEURO: Normal strength and tone, mentation intact and speech normal  PSYCH:  mentation appears normal and affect normal/bright  HEENT: no scleral icterus  CV: distal perfusion intact  RESP: nonlabored breathing      Exam  Right knee:  Pain with end range of motion, some limitation end range with stiffness.  Tender joint lines, more medially.  + pain with circumduction.      Radiology  Visualized radiographs as noted below, and reviewed the images with the patient; if the report was available at the time of the visit, the report was reviewed as well.  No clear fracture. Joint spaces appear preserved, though NWB for x-ray. Similar to previous x-ray.     RIGHT KNEE 1/2 VIEW(S) 11/11/2020 9:40 AM     INDICATION: Right knee pain. Medial tibial plateau " subchondral  fracture.     COMPARISON: 10/23/2020 MRI. 9/12/2020.                                                                      IMPRESSION:  1.  There is subtle new sclerosis in the right medial tibial plateau,  likely related to healing response from the nondisplaced subchondral  fracture.  2.  No additional areas suspicious for healing or acute fracture.  3.  Normal knee joint spacing and alignment.  4.  No joint effusion.        MARQUITA JOSEPH MD        Previous MRI:    MR right knee without contrast 10/23/2020 10:46 AM     Techniques: Multiplanar multisequence imaging of the right knee was  obtained without administration of intra-articular or intravenous  contrast using routing protocol.     History: evaluate ongoing knee pain after injury, medial joint line  pain and tenderness; Right knee pain, unspecified chronicity     Additional History from EMR: None     Comparison: Radiograph the right knee 9/12/2020     Findings:     MENISCI:  Medial meniscus: Avulsion of the posterior root ligament. There is  intrasubstance T2 signal within the posterior horn and extending  slightly into the body of the meniscus without distinct articular  surface violation. Small parameniscal cysts adjacent to the posterior  root tear.   Lateral meniscus: Intact.     LIGAMENTS  Cruciate ligaments: Intact.  Medial supporting structures: Intact. Bowing of the tibial collateral  ligament secondary to peripheral extrusion of the meniscal body.  Moderate amount of fluid in the pes anserine bursa and small fluid in  the semimembranosus bursa.     Lateral supporting structures: Intact.     EXTENSOR MECHANISM  Intact.     FLUID  Small joint effusion and trace Baker's cyst.     OSSEOUS and ARTICULAR STRUCTURES  Bones: Edema like marrow signal in the medial tibial plateau with  hypointense line, consistent with insufficiency fracture. There is a  lesser extent edema-like marrow signal intensity at the peripheral  medial femoral condyle,  likely reactive to meniscal pathology.      Otherwise, no fracture or marrow infiltration.     Likely intraosseous ganglion cyst with posterior crucial ligament  distal attachment.     Patellofemoral compartment: Full-thickness chondral fissuring of the  superior median ridge of the patella with underlying subchondral  edema-like signal and cystlike change. Full-thickness chondral fissure  with subchondral cystic change in the inferior medial trochlea.     Medial compartment: High-grade chondral loss of the medial  compartment.     Lateral compartment: No high-grade hyaline cartilage disease.     ANCILLARY FINDINGS  None.                                                                      Impression:  1. Avulsion of the medial meniscus posterior root ligament with medial  tibial plateau insufficiency fracture.  2. 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  3. Pes anserine bursitis, may be reactive to meniscal pathology.     I have personally reviewed the examination and initial interpretation  and I agree with the findings.     SEN MCCRAY      Assessment:  1. Insufficiency fracture of tibia with routine healing, subsequent encounter    2. Right knee pain, unspecified chronicity    3. Acute medial meniscal injury of right knee, subsequent encounter        Plan:  Discussed the assessment with the patient and her daughter.  Imaging reviewed. Clinically, the insufficiency fracture should be healing to some degree at this point, if present for duration of time since injury. No radiographic change today, however. She also has degenerative arthrosis, and the medial meniscus pathology.  We discussed the following: symptom treatment, activity modification/rest, imaging, rehab, injection therapy and referral to orthopedic surgeon. Following discussion, plan:  She desires PT next. I would defer injection a little longer given the insufficiency  "fracture.  She takes vitamin D supplement, for historical vitamin D deficiency. Future consideration is recheck level, also consider bone density work up pending course.  Follow up: 3-4 weeks, sooner prn.  Questions answered. Discussed signs and symptoms that may indicate more serious issues; the patient was instructed to seek appropriate care if noted. Letebrhan indicates understanding of these issues and agrees with the plan.      Ronen Mcmahon, DO, CAQ            Patient Instructions   We discussed considerations of trying physical therapy (not a \"fix\" for the knee, but could help with pain and function), possibly trying steroid injection (also not a \"fix\" and with a healing fracture typically would defer for a bit longer, more likely up to 3 months from onset), and referral to an orthopedic surgeon for further evaluation and discussion.  Plan physical therapy next; referral placed.  Updated letter for work. With the ongoing symptoms, will remain off work for now.  Discussed using crutches or cane for comfort with walking; may start more weightbearing if comfortable.  Monitor course next 3-4 weeks with PT.    If you have any further questions for your physician or physician s care team you can call [phone] and use option 3 to leave a voice message. Calls received during business hours will be returned same day.        This note consists of symbols derived from keyboarding, dictation and/or voice recognition software. As a result, there may be errors in the script that have gone undetected. Please consider this when interpreting information found in this chart.      "

## 2020-11-11 NOTE — LETTER
REPORT OF WORK ABILITY    NOTE TO EMPLOYEE: You must promptly provide a copy of this report to your  employer or worker's compensation insurer, and Qualified Rehabilitation Consultant.    Date: November 11, 2020                   Employee Name: Gerardo Valenzuela         YOB: 1960  Medical Record Number: [id number]   Soc.Sec.No: [id number]  Employer: None                Date of Injury: 9/12/20  Managed Care Organization / Insurance Company Name: UNKNOWN    Diagnosis: right knee injury, increased symptoms with return to work; tibial plateau insufficiency fracture, meniscal root injury  Work Related: yes     MMI: NO  Permanent Partial Disability (PPD) likely: UNKNOWN    EMPLOYEE IS ABLE TO WORK: continue to remain off work at this time     RESTRICTIONS IF ANY:        OTHER RESTRICTIONS: None    TREATMENT PLAN/NOTES: use crutches or cane for comfort with ambulation. Referred to physical therapy next. Plan recheck 4 weeks to assess status, sooner if needed.            Ronen YOUSIF

## 2020-11-11 NOTE — LETTER
11/11/2020         RE: Gerardo Valenzuela  [address] [facility]  [place] [address]        Dear Colleague,    Thank you for referring your patient, Gerardo Valenzuela, to the [facility]. Please see a copy of my visit note below.    Sports Medicine Clinic Visit    PCP: Bibiana Najera    Gerardo Valenzuela is a 60 year old female who is seen in f/u up for    Insufficiency fracture of tibia with routine healing, subsequent encounter  Right knee pain, unspecified chronicity  Acute medial meniscal injury of right knee, subsequent encounter. Since last visit on 10/28/2020 patient has continued to be as NWB as possible with the use of the crutches.  She does feel like her knee is improving.  Feels there is less swelling.  She has remained off of work.    **  Notes some pain with sit to stand, tightness, some pain.     Notes burning pain after sitting down from being on feet.  Also notes something feels like is shifting in knee.      Review of Systems  All other systems reviewed and are negative unless noted above.    Past Medical History:   Diagnosis Date     GERD (gastroesophageal reflux disease)      Mixed hyperlipidemia 8/20/2018     Pelvic mass in female 10/1/2019    Added automatically from request for surgery [id number]     Prediabetes 8/20/2018     Past Surgical History:   Procedure Laterality Date     HEMORRHOIDECTOMY  1981    in [place]     LAPAROSCOPIC SALPINGO-OOPHORECTOMY Bilateral 10/7/2019    Procedure: Diagnostic laparoscopy, exploratory laparotomy, Bilateral SALPINGO-OOPHORECTOMY, lysis of adhesions;  Surgeon: Prashant Sawyer MD;  Location:  OR     Family History   Problem Relation Age of Onset     Cerebrovascular Disease Mother         CVA     Social History     Socioeconomic History     Marital status:      Spouse name: Not on file     Number of children: Not on file     Years of education: Not on file     Highest education level: Not on file  "  Occupational History     Occupation: Housekeeping     Employer: [employer]   Social Needs     Financial resource strain: Not on file     Food insecurity     Worry: Not on file     Inability: Not on file     Transportation needs     Medical: Not on file     Non-medical: Not on file   Tobacco Use     Smoking status: Never Smoker     Smokeless tobacco: Never Used   Substance and Sexual Activity     Alcohol use: No     Drug use: No     Sexual activity: Yes     Partners: Male   Lifestyle     Physical activity     Days per week: Not on file     Minutes per session: Not on file     Stress: Not on file   Relationships     Social connections     Talks on phone: Not on file     Gets together: Not on file     Attends Restorationism service: Not on file     Active member of club or organization: Not on file     Attends meetings of clubs or organizations: Not on file     Relationship status: Not on file     Intimate partner violence     Fear of current or ex partner: Not on file     Emotionally abused: Not on file     Physically abused: Not on file     Forced sexual activity: Not on file   Other Topics Concern     Parent/sibling w/ CABG, MI or angioplasty before 65F 55M? No   Social History Narrative     Not on file         Objective  /62   Ht 1.549 m (5' 1\")   Wt 64.4 kg (142 lb)   LMP  (LMP Unknown)   BMI 26.83 kg/m      GENERAL APPEARANCE: healthy, alert and no distress   GAIT: crutches  SKIN: no suspicious lesions or rashes  NEURO: Normal strength and tone, mentation intact and speech normal  PSYCH:  mentation appears normal and affect normal/bright  HEENT: no scleral icterus  CV: distal perfusion intact  RESP: nonlabored breathing      Exam  Right knee:  Pain with end range of motion, some limitation end range with stiffness.  Tender joint lines, more medially.  + pain with circumduction.      Radiology  Visualized radiographs as noted below, and reviewed the images with the patient; if the report was " available at the time of the visit, the report was reviewed as well.  No clear fracture. Joint spaces appear preserved, though NWB for x-ray. Similar to previous x-ray.     RIGHT KNEE 1/2 VIEW(S) 11/11/2020 9:40 AM     INDICATION: Right knee pain. Medial tibial plateau subchondral  fracture.     COMPARISON: 10/23/2020 MRI. 9/12/2020.                                                                      IMPRESSION:  1.  There is subtle new sclerosis in the right medial tibial plateau,  likely related to healing response from the nondisplaced subchondral  fracture.  2.  No additional areas suspicious for healing or acute fracture.  3.  Normal knee joint spacing and alignment.  4.  No joint effusion.        MARQUITA JOSEPH MD        Previous MRI:    MR right knee without contrast 10/23/2020 10:46 AM     Techniques: Multiplanar multisequence imaging of the right knee was  obtained without administration of intra-articular or intravenous  contrast using routing protocol.     History: evaluate ongoing knee pain after injury, medial joint line  pain and tenderness; Right knee pain, unspecified chronicity     Additional History from EMR: None     Comparison: Radiograph the right knee 9/12/2020     Findings:     MENISCI:  Medial meniscus: Avulsion of the posterior root ligament. There is  intrasubstance T2 signal within the posterior horn and extending  slightly into the body of the meniscus without distinct articular  surface violation. Small parameniscal cysts adjacent to the posterior  root tear.   Lateral meniscus: Intact.     LIGAMENTS  Cruciate ligaments: Intact.  Medial supporting structures: Intact. Bowing of the tibial collateral  ligament secondary to peripheral extrusion of the meniscal body.  Moderate amount of fluid in the pes anserine bursa and small fluid in  the semimembranosus bursa.     Lateral supporting structures: Intact.     EXTENSOR MECHANISM  Intact.     FLUID  Small joint effusion and trace Baker's  cyst.     OSSEOUS and ARTICULAR STRUCTURES  Bones: Edema like marrow signal in the medial tibial plateau with  hypointense line, consistent with insufficiency fracture. There is a  lesser extent edema-like marrow signal intensity at the peripheral  medial femoral condyle, likely reactive to meniscal pathology.      Otherwise, no fracture or marrow infiltration.     Likely intraosseous ganglion cyst with posterior crucial ligament  distal attachment.     Patellofemoral compartment: Full-thickness chondral fissuring of the  superior median ridge of the patella with underlying subchondral  edema-like signal and cystlike change. Full-thickness chondral fissure  with subchondral cystic change in the inferior medial trochlea.     Medial compartment: High-grade chondral loss of the medial  compartment.     Lateral compartment: No high-grade hyaline cartilage disease.     ANCILLARY FINDINGS  None.                                                                      Impression:  1. Avulsion of the medial meniscus posterior root ligament with medial  tibial plateau insufficiency fracture.  2. 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  3. Pes anserine bursitis, may be reactive to meniscal pathology.     I have personally reviewed the examination and initial interpretation  and I agree with the findings.     SEN MCCRAY      Assessment:  1. Insufficiency fracture of tibia with routine healing, subsequent encounter    2. Right knee pain, unspecified chronicity    3. Acute medial meniscal injury of right knee, subsequent encounter        Plan:  Discussed the assessment with the patient and her daughter.  Imaging reviewed. Clinically, the insufficiency fracture should be healing to some degree at this point, if present for duration of time since injury. No radiographic change today, however. She also has degenerative arthrosis, and the medial meniscus  "pathology.  We discussed the following: symptom treatment, activity modification/rest, imaging, rehab, injection therapy and referral to orthopedic surgeon. Following discussion, plan:  She desires PT next. I would defer injection a little longer given the insufficiency fracture.  She takes vitamin D supplement, for historical vitamin D deficiency. Future consideration is recheck level, also consider bone density work up pending course.  Follow up: 3-4 weeks, sooner prn.  Questions answered. Discussed signs and symptoms that may indicate more serious issues; the patient was instructed to seek appropriate care if noted. Letebrhan indicates understanding of these issues and agrees with the plan.      Ronen Mcmahon, DO, CAQ            Patient Instructions   We discussed considerations of trying physical therapy (not a \"fix\" for the knee, but could help with pain and function), possibly trying steroid injection (also not a \"fix\" and with a healing fracture typically would defer for a bit longer, more likely up to 3 months from onset), and referral to an orthopedic surgeon for further evaluation and discussion.  Plan physical therapy next; referral placed.  Updated letter for work. With the ongoing symptoms, will remain off work for now.  Discussed using crutches or cane for comfort with walking; may start more weightbearing if comfortable.  Monitor course next 3-4 weeks with PT.    If you have any further questions for your physician or physician s care team you can call [phone] and use option 3 to leave a voice message. Calls received during business hours will be returned same day.        This note consists of symbols derived from keyboarding, dictation and/or voice recognition software. As a result, there may be errors in the script that have gone undetected. Please consider this when interpreting information found in this chart.          Again, thank you for allowing me to participate in the care of your " patient.        Sincerely,        Ronen Mcmahon, DO

## 2020-11-11 NOTE — PATIENT INSTRUCTIONS
"We discussed considerations of trying physical therapy (not a \"fix\" for the knee, but could help with pain and function), possibly trying steroid injection (also not a \"fix\" and with a healing fracture typically would defer for a bit longer, more likely up to 3 months from onset), and referral to an orthopedic surgeon for further evaluation and discussion.  Plan physical therapy next; referral placed.  Updated letter for work. With the ongoing symptoms, will remain off work for now.  Discussed using crutches or cane for comfort with walking; may start more weightbearing if comfortable.  Monitor course next 3-4 weeks with PT.    If you have any further questions for your physician or physician s care team you can call [phone] and use option 3 to leave a voice message. Calls received during business hours will be returned same day.    "

## 2020-11-12 ENCOUNTER — THERAPY VISIT (OUTPATIENT)
Dept: PHYSICAL THERAPY | Facility: CLINIC | Age: 60
End: 2020-11-12
Payer: COMMERCIAL

## 2020-11-12 DIAGNOSIS — M25.561 RIGHT KNEE PAIN: ICD-10-CM

## 2020-11-12 DIAGNOSIS — S83.8X1D ACUTE MEDIAL MENISCAL INJURY OF RIGHT KNEE, SUBSEQUENT ENCOUNTER: ICD-10-CM

## 2020-11-12 DIAGNOSIS — M84.469D INSUFFICIENCY FRACTURE OF TIBIA WITH ROUTINE HEALING, SUBSEQUENT ENCOUNTER: ICD-10-CM

## 2020-11-12 DIAGNOSIS — M25.561 RIGHT KNEE PAIN, UNSPECIFIED CHRONICITY: ICD-10-CM

## 2020-11-12 PROCEDURE — 97110 THERAPEUTIC EXERCISES: CPT | Mod: GP | Performed by: PHYSICAL THERAPIST

## 2020-11-12 PROCEDURE — 97530 THERAPEUTIC ACTIVITIES: CPT | Mod: GP | Performed by: PHYSICAL THERAPIST

## 2020-11-12 PROCEDURE — 97161 PT EVAL LOW COMPLEX 20 MIN: CPT | Mod: GP | Performed by: PHYSICAL THERAPIST

## 2020-11-12 NOTE — PROGRESS NOTES
[place] [facility] Initial Evaluation  Subjective:  HPI                    Objective:  System    Physical Exam    General     ROS     KNEE EVALUATION    Physical Therapy Initial Examination/Evaluation November 12, 2020   Gerardo Valenzuela is a 60 year old female referred to physical therapy by Dr. Ronen Mcmahon for treatment of R knee pain with Precautions/Restrictions/MD instructions E&T    Therapist Assessment:   Clinical Impression: Pt presents to [facility] with primary complaint of R knee pain.  Per clinical examination, pt with limited mobility and poor gait mechanics using single point cane at appointment with full wt bearing status.  Pt with with limited ROM and pain limiting activity.  Pt will benefit from skilled physical therapy to improve motion, strengthen LE, possible trial of brace, and education on proper gait mechanics.      Subjective: On September 12, was walking in building at work and heard a pop in L leg . Three weeks prior was moving a refrigerator and had pain in L LE. It improved until September 12th incident. She was placed on light duty work. At her follow-up visit due to ongoing pain, fracture was found. She was using crutches up until yesterday and now is using cane. She is continuing to have significant pain.  DOI/onset: 09/12/2020   Mechanism of injury: walking at work    DOS NA   Related PMH: low back pain  Previous treatment: NA   Imaging: MRI    Chief Complaint: R knee pain    Pain: rest 4 /10, activity 9/10  Described as: burning, not comfortable Alleviated by: rest Exacerbated by: wt bearing, straightening knee Progression of symptoms since initial onset: staying the same, maybe slight improvement Time of day when pain is worse: none noted    Sleeping: does keep her awake at night    Social history: Lives at home with ; adult daughter is currently living with her parents to help out  Occupation: Housekeeping  Job duties: lifting/carrying, bending, reaching,  prolonged standing, pushing/pulling, repetitive tasks   Current HE Exercise regimen: Very active at job   Patient's goals are decrease pain, return to PLOF, get back to work    Other pertinent PMH: pain at night General health as reported by patient: Good    Return to MD: prn        Dynamic Movement Screen  Gait: Using SPC initially in R hand. Antalgic gait, minimal flexion/extension in knee    Range of Motion      Knee PROM Extension Flexion   Left WNL WNL   Right 0-8 limited by pain  NT     Knee AROM Extension Flexion   Left WNL WNL   Right 0-11 99        Hip and Knee Strength   Not tested due to pain irritability     PALPATION  Right: pain throughout knee joint     Assessment/Plan:  Patient is a 60 year old female with right side knee complaints.    Patient has the following significant findings with corresponding treatment plan.                Diagnosis 1:  Insufficiency fracture of R tibia, Acute medial mensical tear  Pain -  hot/cold therapy, manual therapy, self management, education and home program  Decreased ROM/flexibility - manual therapy, therapeutic exercise, therapeutic activity and home program  Decreased strength - therapeutic exercise, therapeutic activities and home program  Impaired balance - neuro re-education, gait training, therapeutic activities and home program  Impaired gait - gait training, assistive devices and home program  Impaired muscle performance - neuro re-education and home program  Decreased function - therapeutic activities and home program    Therapy Evaluation Codes:   1) History comprised of:   Personal factors that impact the plan of care:      Past/current experiences, Profession and Time since onset of symptoms.    Comorbidity factors that impact the plan of care are:      Pain at night/rest.     Medications impacting care: NSAIDS.  2) Examination of Body Systems comprised of:   Body structures and functions that impact the plan of care:      Knee.   Activity limitations  that impact the plan of care are:      Bathing, Cooking, Dressing, Lifting, Sitting, Sports, Squatting/kneeling, Stairs, Standing, Walking, Working and Sleeping.  3) Clinical presentation characteristics are:   Stable/Uncomplicated.  4) Decision-Making    Low complexity using standardized patient assessment instrument and/or measureable assessment of functional outcome.  Cumulative Therapy Evaluation is: Low complexity.    Previous and current functional limitations:  (See Goal Flow Sheet for this information)    Short term and Long term goals: (See Goal Flow Sheet for this information)     Communication ability:  Patient appears to be able to clearly communicate and understand verbal and written communication and follow directions correctly.  Treatment Explanation - The following has been discussed with the patient:   RX ordered/plan of care  Anticipated outcomes  Possible risks and side effects  This patient would benefit from PT intervention to resume normal activities.   Rehab potential is good.    Frequency:  2 X week, once daily  Duration:  for 2 weeks tapering to 1 X a week over 4 weeks  Discharge Plan:  Achieve all LTG.  Independent in home treatment program.  Reach maximal therapeutic benefit.    Please refer to the daily flowsheet for treatment today, total treatment time and time spent performing 1:1 timed codes.

## 2020-11-12 NOTE — LETTER
[facility]  [address] [facility] [address]  [address]  [place] [address]  [phone]    2020    Re: Gerardo Valenzuela   :   1960  MRN:  [id number]   REFERRING PHYSICIAN:   DO KAREEN Box PT  Date of Initial Evaluation:  2020  Visits:  Rxs Used: 1  Reason for Referral:     Insufficiency fracture of tibia with routine healing, subsequent encounter  Right knee pain, unspecified chronicity  Acute medial meniscal injury of right knee, subsequent encounter  Right knee pain    EVALUATION SUMMARY    [place] [facility] Initial Evaluation  KNEE EVALUATION    Physical Therapy Initial Examination/Evaluation 2020   Gerardo Valenzuela is a 60 year old female referred to physical therapy by Dr. Ronen Mcmahon for treatment of R knee pain with Precautions/Restrictions/MD instructions E&T    Therapist Assessment:   Clinical Impression: Pt presents to [facility] with primary complaint of R knee pain.  Per clinical examination, pt with limited mobility and poor gait mechanics using single point cane at appointment with full wt bearing status.  Pt with with limited ROM and pain limiting activity.  Pt will benefit from skilled physical therapy to improve motion, strengthen LE, possible trial of brace, and education on proper gait mechanics.      Subjective: On , was walking in building at work and heard a pop in L leg . Three weeks prior was moving a refrigerator and had pain in L LE. It improved until  incident. She was placed on light duty work. At her follow-up visit due to ongoing pain, fracture was found. She was using crutches up until yesterday and now is using cane. She is continuing to have significant pain.  DOI/onset: 2020   Mechanism of injury: walking at work    DOS NA   Related PMH: low back pain  Previous treatment: NA   Imaging: MRI    Chief Complaint: R knee pain    Pain: rest  /10, activity /10  Described as:  burning, not comfortable Alleviated by: rest Exacerbated by: wt bearing, straightening knee Progression of symptoms since initial onset: staying the same, maybe slight improvement Time of day when pain is worse: none noted    Re: Gerardo Valenzuela   :   1960    Sleeping: does keep her awake at night    Social history: Lives at home with ; adult daughter is currently living with her parents to help out  Occupation: Housekeeping  Job duties: lifting/carrying, bending, reaching, prolonged standing, pushing/pulling, repetitive tasks   Current HE Exercise regimen: Very active at job   Patient's goals are decrease pain, return to PLOF, get back to work    Other pertinent PMH: pain at night General health as reported by patient: Good    Return to MD: prn      Dynamic Movement Screen  Gait: Using SPC initially in R hand. Antalgic gait, minimal flexion/extension in knee    Range of Motion      Knee PROM Extension Flexion   Left WNL WNL   Right 0-8 limited by pain  NT     Knee AROM Extension Flexion   Left WNL WNL   Right 0-11 99      Hip and Knee Strength   Not tested due to pain irritability     PALPATION  Right: pain throughout knee joint     Assessment/Plan:  Patient is a 60 year old female with right side knee complaints.    Patient has the following significant findings with corresponding treatment plan.                Diagnosis 1:  Insufficiency fracture of R tibia, Acute medial mensical tear  Pain -  hot/cold therapy, manual therapy, self management, education and home program  Decreased ROM/flexibility - manual therapy, therapeutic exercise, therapeutic activity and home program  Decreased strength - therapeutic exercise, therapeutic activities and home program  Impaired balance - neuro re-education, gait training, therapeutic activities and home program  Impaired gait - gait training, assistive devices and home program  Impaired muscle performance - neuro re-education and home program  Decreased  function - therapeutic activities and home program    Therapy Evaluation Codes:   1) History comprised of:   Personal factors that impact the plan of care:      Past/current experiences, Profession and Time since onset of symptoms.    Comorbidity factors that impact the plan of care are:      Pain at night/rest.     Medications impacting care: NSAIDS.  Re: Gerardo Valenzuela   :   1960    2) Examination of Body Systems comprised of:   Body structures and functions that impact the plan of care:      Knee.   Activity limitations that impact the plan of care are:      Bathing, Cooking, Dressing, Lifting, Sitting, Sports, Squatting/kneeling, Stairs, Standing, Walking, Working and Sleeping.  3) Clinical presentation characteristics are:   Stable/Uncomplicated.  4) Decision-Making    Low complexity using standardized patient assessment instrument and/or measureable assessment of functional outcome.  Cumulative Therapy Evaluation is: Low complexity.    Previous and current functional limitations:  (See Goal Flow Sheet for this information)    Short term and Long term goals: (See Goal Flow Sheet for this information)     Communication ability:  Patient appears to be able to clearly communicate and understand verbal and written communication and follow directions correctly.  Treatment Explanation - The following has been discussed with the patient:   RX ordered/plan of care  Anticipated outcomes  Possible risks and side effects  This patient would benefit from PT intervention to resume normal activities.   Rehab potential is good.    Frequency:  2 X week, once daily  Duration:  for 2 weeks tapering to 1 X a week over 4 weeks  Discharge Plan:  Achieve all LTG.  Independent in home treatment program.  Reach maximal therapeutic benefit.    Please refer to the daily flowsheet for treatment today, total treatment time and time spent performing 1:1 timed codes.       Thank you for your referral.    INQUIRIES  Therapist:  Padmini Osuna, PT, DPT  [facility]  [address] [address]  [place] [address]  Phone: [phone]  Fax: [phone]

## 2020-11-13 PROBLEM — M25.561 RIGHT KNEE PAIN: Status: ACTIVE | Noted: 2020-11-13

## 2020-11-13 ASSESSMENT — ACTIVITIES OF DAILY LIVING (ADL)
GO UP STAIRS: ACTIVITY IS VERY DIFFICULT
STIFFNESS: THE SYMPTOM AFFECTS MY ACTIVITY MODERATELY
RAW_SCORE: 19
WEAKNESS: THE SYMPTOM PREVENTS ME FROM ALL DAILY ACTIVITIES
HOW_WOULD_YOU_RATE_THE_CURRENT_FUNCTION_OF_YOUR_KNEE_DURING_YOUR_USUAL_DAILY_ACTIVITIES_ON_A_SCALE_FROM_0_TO_100_WITH_100_BEING_YOUR_LEVEL_OF_KNEE_FUNCTION_PRIOR_TO_YOUR_INJURY_AND_0_BEING_THE_INABILITY_TO_PERFORM_ANY_OF_YOUR_USUAL_DAILY_ACTIVITIES?: 25
RISE FROM A CHAIR: ACTIVITY IS SOMEWHAT DIFFICULT
SIT WITH YOUR KNEE BENT: ACTIVITY IS MINIMALLY DIFFICULT
STAND: ACTIVITY IS VERY DIFFICULT
SQUAT: I AM UNABLE TO DO THE ACTIVITY
KNEE_ACTIVITY_OF_DAILY_LIVING_SCORE: 27.14
WALK: ACTIVITY IS VERY DIFFICULT
GIVING WAY, BUCKLING OR SHIFTING OF KNEE: I DO NOT HAVE THE SYMPTOM
AS_A_RESULT_OF_YOUR_KNEE_INJURY,_HOW_WOULD_YOU_RATE_YOUR_CURRENT_LEVEL_OF_DAILY_ACTIVITY?: ABNORMAL
SWELLING: THE SYMPTOM AFFECTS MY ACTIVITY SEVERELY
LIMPING: THE SYMPTOM PREVENTS ME FROM ALL DAILY ACTIVITIES
KNEEL ON THE FRONT OF YOUR KNEE: I AM UNABLE TO DO THE ACTIVITY
KNEE_ACTIVITY_OF_DAILY_LIVING_SUM: 19
HOW_WOULD_YOU_RATE_THE_OVERALL_FUNCTION_OF_YOUR_KNEE_DURING_YOUR_USUAL_DAILY_ACTIVITIES?: SEVERELY ABNORMAL
GO DOWN STAIRS: ACTIVITY IS VERY DIFFICULT
PAIN: THE SYMPTOM PREVENTS ME FROM ALL DAILY ACTIVITIES

## 2020-11-17 ENCOUNTER — MYC MEDICAL ADVICE (OUTPATIENT)
Dept: ORTHOPEDICS | Facility: CLINIC | Age: 60
End: 2020-11-17

## 2020-11-18 ENCOUNTER — THERAPY VISIT (OUTPATIENT)
Dept: PHYSICAL THERAPY | Facility: CLINIC | Age: 60
End: 2020-11-18
Payer: COMMERCIAL

## 2020-11-18 DIAGNOSIS — M25.561 RIGHT KNEE PAIN: ICD-10-CM

## 2020-11-18 PROCEDURE — 97110 THERAPEUTIC EXERCISES: CPT | Mod: GP | Performed by: PHYSICAL THERAPIST

## 2020-11-30 ENCOUNTER — THERAPY VISIT (OUTPATIENT)
Dept: PHYSICAL THERAPY | Facility: CLINIC | Age: 60
End: 2020-11-30
Payer: COMMERCIAL

## 2020-11-30 DIAGNOSIS — M25.561 RIGHT KNEE PAIN: ICD-10-CM

## 2020-11-30 PROCEDURE — 97110 THERAPEUTIC EXERCISES: CPT | Mod: GP | Performed by: PHYSICAL THERAPIST

## 2020-11-30 PROCEDURE — 97530 THERAPEUTIC ACTIVITIES: CPT | Mod: GP | Performed by: PHYSICAL THERAPIST

## 2020-12-14 ENCOUNTER — THERAPY VISIT (OUTPATIENT)
Dept: PHYSICAL THERAPY | Facility: CLINIC | Age: 60
End: 2020-12-14
Payer: COMMERCIAL

## 2020-12-14 DIAGNOSIS — M25.561 RIGHT KNEE PAIN: ICD-10-CM

## 2020-12-14 PROCEDURE — 97530 THERAPEUTIC ACTIVITIES: CPT | Mod: GP | Performed by: PHYSICAL THERAPIST

## 2020-12-14 PROCEDURE — 97110 THERAPEUTIC EXERCISES: CPT | Mod: GP | Performed by: PHYSICAL THERAPIST

## 2020-12-14 NOTE — PROGRESS NOTES
Subjective:  HPI  Physical Exam                    Objective:  System    Physical Exam    General     ROS      PROGRESS  REPORT    Progress reporting period is from 2020 to 2020.       SUBJECTIVE  Subjective: Patient using a FWW for mobility and this has been helpful. She did some mall walking yesterday for about 15 minutes. She did have swelling in her knee after this.      Initial Pain level: 9/10.   Changes in function:  Yes (See Goal flowsheet attached for changes in current functional level)  Adverse reaction to treatment or activity: None    OBJECTIVE  Knee  Brace Trial:     Pain Rating    Affected Joint: Right Knee    Without  brace:    At rest 5/10  Walkin/10    Squatting: Not attempted due to pain   Stairs: 7/10      Wearing  brace:    At rest 3/10  Walking: 3/10    Squattin/10   Stairs: 4/10      Brace and Measurements:    Brace trialed:     Type - Ossur  One X    Size - Large  Side - Medial  Affected Knee - Right Knee    Circumference 6 inches below mid patella: 12.75 inches    Other Comments:  Improved symptoms noted with walking, stairs, and home exercise program        Objective: Pt with improved ROM in knee and continuing to improve quadriceps strength. Pt continues to be limited by pain with mobility.  Trial of Ossur  brace and pt feeling pain relief with stairs and ambulation.      ASSESSMENT/PLAN  Updated problem list and treatment plan: Diagnosis 1:  R knee pain, Insufficiency fracture of tibia, acute medial meniscus injury of R knee   Pain -  hot/cold therapy, splint/taping/bracing/orthotics, education and home program  Decreased strength - therapeutic exercise, therapeutic activities and home program  Impaired gait - gait training, assistive devices and home program  Impaired muscle performance - neuro re-education and home program  Decreased function - therapeutic activities and home program  STG/LTGs have been met or progress has been  made towards goals:  Yes (See Goal flow sheet completed today.)  Assessment of Progress: The patient's condition is improving.  Self Management Plans:  Patient has been instructed in a home treatment program.  I have re-evaluated this patient and find that the nature, scope, duration and intensity of the therapy is appropriate for the medical condition of the patient.  Gerardo continues to require the following intervention to meet STG and LTG's:  PT    Recommendations:  This patient would benefit from continued therapy.     Frequency:  1 X week, once daily  Duration:  for 6 weeks        Please refer to the daily flowsheet for treatment today, total treatment time and time spent performing 1:1 timed codes.

## 2020-12-21 ENCOUNTER — OFFICE VISIT (OUTPATIENT)
Dept: ORTHOPEDICS | Facility: CLINIC | Age: 60
End: 2020-12-21
Payer: COMMERCIAL

## 2020-12-21 ENCOUNTER — ANCILLARY PROCEDURE (OUTPATIENT)
Dept: GENERAL RADIOLOGY | Facility: CLINIC | Age: 60
End: 2020-12-21
Attending: PEDIATRICS
Payer: COMMERCIAL

## 2020-12-21 ENCOUNTER — MYC MEDICAL ADVICE (OUTPATIENT)
Dept: ORTHOPEDICS | Facility: CLINIC | Age: 60
End: 2020-12-21

## 2020-12-21 VITALS
WEIGHT: 140 LBS | DIASTOLIC BLOOD PRESSURE: 74 MMHG | BODY MASS INDEX: 26.43 KG/M2 | HEIGHT: 61 IN | SYSTOLIC BLOOD PRESSURE: 122 MMHG

## 2020-12-21 DIAGNOSIS — M84.469D INSUFFICIENCY FRACTURE OF TIBIA WITH ROUTINE HEALING, SUBSEQUENT ENCOUNTER: ICD-10-CM

## 2020-12-21 DIAGNOSIS — M25.561 RIGHT KNEE PAIN, UNSPECIFIED CHRONICITY: ICD-10-CM

## 2020-12-21 DIAGNOSIS — M84.469D INSUFFICIENCY FRACTURE OF TIBIA WITH ROUTINE HEALING, SUBSEQUENT ENCOUNTER: Primary | ICD-10-CM

## 2020-12-21 DIAGNOSIS — S83.8X1D ACUTE MEDIAL MENISCAL INJURY OF RIGHT KNEE, SUBSEQUENT ENCOUNTER: ICD-10-CM

## 2020-12-21 PROCEDURE — 73560 X-RAY EXAM OF KNEE 1 OR 2: CPT | Mod: RT | Performed by: RADIOLOGY

## 2020-12-21 PROCEDURE — 99213 OFFICE O/P EST LOW 20 MIN: CPT | Performed by: PEDIATRICS

## 2020-12-21 ASSESSMENT — MIFFLIN-ST. JEOR: SCORE: 1137.42

## 2020-12-21 NOTE — TELEPHONE ENCOUNTER
Short term disability form completed and given to provider for signature.     Will await physician signature to fax.     Mary Wadsworth M.Ed., LAT, ATC

## 2020-12-21 NOTE — LETTER
"    12/21/2020         RE: Gerardo Valenzuela  [address] [place] [address]  [place] [address]        Dear Colleague,    Thank you for referring your patient, Gerardo Valenzuela, to the [facility]. Please see a copy of my visit note below.    Sports Medicine Clinic Visit    PCP: Bibiana Najera    Gerardo Valenzuela is a 60 year old female who is seen in f/u up for    Insufficiency fracture of tibia with routine healing, subsequent encounter  Right knee pain, unspecified chronicity  Acute medial meniscal injury of right knee, subsequent encounter. Since last visit on 11/11/2020 patient has continued to have pain in her knee.  She does feel there is some improvement.  Currently using a walker for ambulation.    She has remained off of work.    Has been using a a knee knee brace that she began using 3 days ago.  Has been continuing with PT.  Repeat x rays taken prior to todays exam    DOI: 9/12/20 14 weeks  **  Feels \"ok\" right now, seated in clinic.  Using medial  brace, with some benefit.      Review of Systems  All other systems reviewed and are negative unless noted above.    Past Medical History:   Diagnosis Date     GERD (gastroesophageal reflux disease)      Mixed hyperlipidemia 8/20/2018     Pelvic mass in female 10/1/2019    Added automatically from request for surgery [id number]     Prediabetes 8/20/2018     Past Surgical History:   Procedure Laterality Date     HEMORRHOIDECTOMY  1981    in [place]     LAPAROSCOPIC SALPINGO-OOPHORECTOMY Bilateral 10/7/2019    Procedure: Diagnostic laparoscopy, exploratory laparotomy, Bilateral SALPINGO-OOPHORECTOMY, lysis of adhesions;  Surgeon: Prashant Sawyer MD;  Location:  OR     Family History   Problem Relation Age of Onset     Cerebrovascular Disease Mother         CVA     Social History     Socioeconomic History     Marital status:      Spouse name: Not on file     Number of children: Not on file     Years of education: " "Not on file     Highest education level: Not on file   Occupational History     Occupation: Housekeeping     Employer: [employer]   Social Needs     Financial resource strain: Not on file     Food insecurity     Worry: Not on file     Inability: Not on file     Transportation needs     Medical: Not on file     Non-medical: Not on file   Tobacco Use     Smoking status: Never Smoker     Smokeless tobacco: Never Used   Substance and Sexual Activity     Alcohol use: No     Drug use: No     Sexual activity: Yes     Partners: Male   Lifestyle     Physical activity     Days per week: Not on file     Minutes per session: Not on file     Stress: Not on file   Relationships     Social connections     Talks on phone: Not on file     Gets together: Not on file     Attends Mandaeism service: Not on file     Active member of club or organization: Not on file     Attends meetings of clubs or organizations: Not on file     Relationship status: Not on file     Intimate partner violence     Fear of current or ex partner: Not on file     Emotionally abused: Not on file     Physically abused: Not on file     Forced sexual activity: Not on file   Other Topics Concern     Parent/sibling w/ CABG, MI or angioplasty before 65F 55M? No   Social History Narrative     Not on file         Objective  /74   Ht 1.549 m (5' 1\")   Wt 63.5 kg (140 lb)   LMP  (LMP Unknown)   BMI 26.45 kg/m      GENERAL APPEARANCE: healthy, alert and no distress   GAIT: mild antalgic  SKIN: no suspicious lesions or rashes  NEURO: Normal strength and tone, mentation intact and speech normal  PSYCH:  mentation appears normal and affect normal/bright  HEENT: no scleral icterus  CV: distal perfusion intact  RESP: nonlabored breathing      Exam  Tender medial joint line right knee  No clear effusion      Radiology  Visualized radiographs as noted below, and reviewed the images with the patient; if the report was available at the time of the visit, the " report was reviewed as well.      Recent Results (from the past 744 hour(s))   XR Knee Right 1/2 Views    Narrative    XR KNEE RT 1 /2 VW 12/21/2020 9:10 AM     HISTORY: knee pain; Insufficiency fracture of tibia with routine  healing, subsequent encounter    COMPARISON: 11/11/2020 . MRI dated 10/23/2020      Impression    IMPRESSION: Joint spaces are maintained. Previously seen sclerosis  along the medial tibial plateau is less well-defined. No discrete  fracture lucency or displacement. No joint effusion.    GABRIEL HERNANDEZ MD       Previous MRI:    MR right knee without contrast 10/23/2020 10:46 AM     Techniques: Multiplanar multisequence imaging of the right knee was  obtained without administration of intra-articular or intravenous  contrast using routing protocol.     History: evaluate ongoing knee pain after injury, medial joint line  pain and tenderness; Right knee pain, unspecified chronicity     Additional History from EMR: None     Comparison: Radiograph the right knee 9/12/2020     Findings:     MENISCI:  Medial meniscus: Avulsion of the posterior root ligament. There is  intrasubstance T2 signal within the posterior horn and extending  slightly into the body of the meniscus without distinct articular  surface violation. Small parameniscal cysts adjacent to the posterior  root tear.   Lateral meniscus: Intact.     LIGAMENTS  Cruciate ligaments: Intact.  Medial supporting structures: Intact. Bowing of the tibial collateral  ligament secondary to peripheral extrusion of the meniscal body.  Moderate amount of fluid in the pes anserine bursa and small fluid in  the semimembranosus bursa.     Lateral supporting structures: Intact.     EXTENSOR MECHANISM  Intact.     FLUID  Small joint effusion and trace Baker's cyst.     OSSEOUS and ARTICULAR STRUCTURES  Bones: Edema like marrow signal in the medial tibial plateau with  hypointense line, consistent with insufficiency fracture. There is a  lesser extent  edema-like marrow signal intensity at the peripheral  medial femoral condyle, likely reactive to meniscal pathology.      Otherwise, no fracture or marrow infiltration.     Likely intraosseous ganglion cyst with posterior crucial ligament  distal attachment.     Patellofemoral compartment: Full-thickness chondral fissuring of the  superior median ridge of the patella with underlying subchondral  edema-like signal and cystlike change. Full-thickness chondral fissure  with subchondral cystic change in the inferior medial trochlea.     Medial compartment: High-grade chondral loss of the medial  compartment.     Lateral compartment: No high-grade hyaline cartilage disease.     ANCILLARY FINDINGS  None.                                                                      Impression:  1. Avulsion of the medial meniscus posterior root ligament with medial  tibial plateau insufficiency fracture.  2. 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  3. Pes anserine bursitis, may be reactive to meniscal pathology.     I have personally reviewed the examination and initial interpretation  and I agree with the findings.     SEN MCCRAY      Assessment:  1. Insufficiency fracture of tibia with routine healing, subsequent encounter    2. Right knee pain, unspecified chronicity    3. Acute medial meniscal injury of right knee, subsequent encounter        Plan:  Discussed the assessment with the patient.  Some improvement, some ongoing symptoms.  We discussed continuing with PT, continuing with bracing, also consideration of injection, and referral to see ortho surgeon. Her preference is continue with therapy and bracing.  Updated letter, continue with current restrictions.   Follow up: ~4 weeks, sooner prn. Do not anticipate repeat x-rays unless change in symptoms or other concerns.  Questions answered. Discussed signs and symptoms that may indicate more  serious issues; the patient was instructed to seek appropriate care if noted. Letebrhan indicates understanding of these issues and agrees with the plan.      Ronen Mcmahon DO, CAJODY          This note consists of symbols derived from keyboarding, dictation and/or voice recognition software. As a result, there may be errors in the script that have gone undetected. Please consider this when interpreting information found in this chart.          Again, thank you for allowing me to participate in the care of your patient.        Sincerely,        Ronen Mcmahon DO

## 2020-12-21 NOTE — LETTER
REPORT OF WORK ABILITY    NOTE TO EMPLOYEE: You must promptly provide a copy of this report to your  employer or worker's compensation insurer, and Qualified Rehabilitation Consultant.    Date: December 21, 2020                   Employee Name: Gerardo Valenzuela         YOB: 1960  Medical Record Number: [id number]   Soc.Sec.No: [id number]  Employer: None                Date of Injury: 9/12/20  Managed Care Organization / Insurance Company Name: UNKNOWN    Diagnosis: right knee injury; tibial plateau insufficiency fracture, meniscal root injury, medial compartment chondral loss  Work Related: yes     MMI: NO  Permanent Partial Disability (PPD) likely: UNKNOWN    EMPLOYEE IS ABLE TO WORK: continue to remain off work at this time     RESTRICTIONS IF ANY:        OTHER RESTRICTIONS: None    TREATMENT PLAN/NOTES: use crutches, cane, or walker for comfort with ambulation. Continue with physical therapy, supportive brace. Plan recheck 4 weeks to assess status, sooner if needed.            Ronen YOUSIF

## 2020-12-21 NOTE — PROGRESS NOTES
"Sports Medicine Clinic Visit    PCP: Bibiana Najera JOHN Valenzuela is a 60 year old female who is seen in f/u up for    Insufficiency fracture of tibia with routine healing, subsequent encounter  Right knee pain, unspecified chronicity  Acute medial meniscal injury of right knee, subsequent encounter. Since last visit on 11/11/2020 patient has continued to have pain in her knee.  She does feel there is some improvement.  Currently using a walker for ambulation.    She has remained off of work.    Has been using a a knee knee brace that she began using 3 days ago.  Has been continuing with PT.  Repeat x rays taken prior to todays exam    DOI: 9/12/20 14 weeks  **  Feels \"ok\" right now, seated in clinic.  Using medial  brace, with some benefit.      Review of Systems  All other systems reviewed and are negative unless noted above.    Past Medical History:   Diagnosis Date     GERD (gastroesophageal reflux disease)      Mixed hyperlipidemia 8/20/2018     Pelvic mass in female 10/1/2019    Added automatically from request for surgery [id number]     Prediabetes 8/20/2018     Past Surgical History:   Procedure Laterality Date     HEMORRHOIDECTOMY  1981    in [place]     LAPAROSCOPIC SALPINGO-OOPHORECTOMY Bilateral 10/7/2019    Procedure: Diagnostic laparoscopy, exploratory laparotomy, Bilateral SALPINGO-OOPHORECTOMY, lysis of adhesions;  Surgeon: Prashant Sawyer MD;  Location:  OR     Family History   Problem Relation Age of Onset     Cerebrovascular Disease Mother         CVA     Social History     Socioeconomic History     Marital status:      Spouse name: Not on file     Number of children: Not on file     Years of education: Not on file     Highest education level: Not on file   Occupational History     Occupation: Housekeeping     Employer: [employer]     Financial resource strain: Not on file     Food insecurity     Worry: Not on file     Inability: Not on file     " "Transportation needs     Medical: Not on file     Non-medical: Not on file   Tobacco Use     Smoking status: Never Smoker     Smokeless tobacco: Never Used   Substance and Sexual Activity     Alcohol use: No     Drug use: No     Sexual activity: Yes     Partners: Male   Lifestyle     Physical activity     Days per week: Not on file     Minutes per session: Not on file     Stress: Not on file   Relationships     Social connections     Talks on phone: Not on file     Gets together: Not on file     Attends Yarsani service: Not on file     Active member of club or organization: Not on file     Attends meetings of clubs or organizations: Not on file     Relationship status: Not on file     Intimate partner violence     Fear of current or ex partner: Not on file     Emotionally abused: Not on file     Physically abused: Not on file     Forced sexual activity: Not on file   Other Topics Concern     Parent/sibling w/ CABG, MI or angioplasty before 65F 55M? No   Social History Narrative     Not on file         Objective  /74   Ht 1.549 m (5' 1\")   Wt 63.5 kg (140 lb)   LMP  (LMP Unknown)   BMI 26.45 kg/m      GENERAL APPEARANCE: healthy, alert and no distress   GAIT: mild antalgic  SKIN: no suspicious lesions or rashes  NEURO: Normal strength and tone, mentation intact and speech normal  PSYCH:  mentation appears normal and affect normal/bright  HEENT: no scleral icterus  CV: distal perfusion intact  RESP: nonlabored breathing      Exam  Tender medial joint line right knee  No clear effusion      Radiology  Visualized radiographs as noted below, and reviewed the images with the patient; if the report was available at the time of the visit, the report was reviewed as well.      Recent Results (from the past 744 hour(s))   XR Knee Right 1/2 Views    Narrative    XR KNEE RT 1 /2 VW 12/21/2020 9:10 AM     HISTORY: knee pain; Insufficiency fracture of tibia with routine  healing, subsequent encounter    COMPARISON: " 11/11/2020 . MRI dated 10/23/2020      Impression    IMPRESSION: Joint spaces are maintained. Previously seen sclerosis  along the medial tibial plateau is less well-defined. No discrete  fracture lucency or displacement. No joint effusion.    GABRIEL HERNANDEZ MD       Previous MRI:    MR right knee without contrast 10/23/2020 10:46 AM     Techniques: Multiplanar multisequence imaging of the right knee was  obtained without administration of intra-articular or intravenous  contrast using routing protocol.     History: evaluate ongoing knee pain after injury, medial joint line  pain and tenderness; Right knee pain, unspecified chronicity     Additional History from EMR: None     Comparison: Radiograph the right knee 9/12/2020     Findings:     MENISCI:  Medial meniscus: Avulsion of the posterior root ligament. There is  intrasubstance T2 signal within the posterior horn and extending  slightly into the body of the meniscus without distinct articular  surface violation. Small parameniscal cysts adjacent to the posterior  root tear.   Lateral meniscus: Intact.     LIGAMENTS  Cruciate ligaments: Intact.  Medial supporting structures: Intact. Bowing of the tibial collateral  ligament secondary to peripheral extrusion of the meniscal body.  Moderate amount of fluid in the pes anserine bursa and small fluid in  the semimembranosus bursa.     Lateral supporting structures: Intact.     EXTENSOR MECHANISM  Intact.     FLUID  Small joint effusion and trace Baker's cyst.     OSSEOUS and ARTICULAR STRUCTURES  Bones: Edema like marrow signal in the medial tibial plateau with  hypointense line, consistent with insufficiency fracture. There is a  lesser extent edema-like marrow signal intensity at the peripheral  medial femoral condyle, likely reactive to meniscal pathology.      Otherwise, no fracture or marrow infiltration.     Likely intraosseous ganglion cyst with posterior crucial ligament  distal  attachment.     Patellofemoral compartment: Full-thickness chondral fissuring of the  superior median ridge of the patella with underlying subchondral  edema-like signal and cystlike change. Full-thickness chondral fissure  with subchondral cystic change in the inferior medial trochlea.     Medial compartment: High-grade chondral loss of the medial  compartment.     Lateral compartment: No high-grade hyaline cartilage disease.     ANCILLARY FINDINGS  None.                                                                      Impression:  1. Avulsion of the medial meniscus posterior root ligament with medial  tibial plateau insufficiency fracture.  2. 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  3. Pes anserine bursitis, may be reactive to meniscal pathology.     I have personally reviewed the examination and initial interpretation  and I agree with the findings.     SEN MCCRAY      Assessment:  1. Insufficiency fracture of tibia with routine healing, subsequent encounter    2. Right knee pain, unspecified chronicity    3. Acute medial meniscal injury of right knee, subsequent encounter        Plan:  Discussed the assessment with the patient.  Some improvement, some ongoing symptoms.  We discussed continuing with PT, continuing with bracing, also consideration of injection, and referral to see ortho surgeon. Her preference is continue with therapy and bracing.  Updated letter, continue with current restrictions.   Follow up: ~4 weeks, sooner prn. Do not anticipate repeat x-rays unless change in symptoms or other concerns.  Questions answered. Discussed signs and symptoms that may indicate more serious issues; the patient was instructed to seek appropriate care if noted. Letebrhan indicates understanding of these issues and agrees with the plan.      Ronen Mcmahon, DO, CAQ          This note consists of symbols derived from keyboarding,  dictation and/or voice recognition software. As a result, there may be errors in the script that have gone undetected. Please consider this when interpreting information found in this chart.

## 2020-12-22 ENCOUNTER — THERAPY VISIT (OUTPATIENT)
Dept: PHYSICAL THERAPY | Facility: CLINIC | Age: 60
End: 2020-12-22
Payer: COMMERCIAL

## 2020-12-22 DIAGNOSIS — M25.561 RIGHT KNEE PAIN: ICD-10-CM

## 2020-12-22 PROCEDURE — 97530 THERAPEUTIC ACTIVITIES: CPT | Mod: GP | Performed by: PHYSICAL THERAPIST

## 2020-12-22 PROCEDURE — 97112 NEUROMUSCULAR REEDUCATION: CPT | Mod: GP | Performed by: PHYSICAL THERAPIST

## 2020-12-22 PROCEDURE — 97110 THERAPEUTIC EXERCISES: CPT | Mod: GP | Performed by: PHYSICAL THERAPIST

## 2021-01-05 ENCOUNTER — THERAPY VISIT (OUTPATIENT)
Dept: PHYSICAL THERAPY | Facility: CLINIC | Age: 61
End: 2021-01-05
Payer: COMMERCIAL

## 2021-01-05 DIAGNOSIS — M25.561 RIGHT KNEE PAIN: ICD-10-CM

## 2021-01-05 PROCEDURE — 97110 THERAPEUTIC EXERCISES: CPT | Mod: GP | Performed by: PHYSICAL THERAPIST

## 2021-01-12 ENCOUNTER — THERAPY VISIT (OUTPATIENT)
Dept: PHYSICAL THERAPY | Facility: CLINIC | Age: 61
End: 2021-01-12
Payer: COMMERCIAL

## 2021-01-12 DIAGNOSIS — M25.561 RIGHT KNEE PAIN: ICD-10-CM

## 2021-01-12 PROCEDURE — 97110 THERAPEUTIC EXERCISES: CPT | Mod: GP | Performed by: PHYSICAL THERAPIST

## 2021-01-12 PROCEDURE — 97112 NEUROMUSCULAR REEDUCATION: CPT | Mod: GP | Performed by: PHYSICAL THERAPIST

## 2021-01-12 PROCEDURE — 97530 THERAPEUTIC ACTIVITIES: CPT | Mod: GP | Performed by: PHYSICAL THERAPIST

## 2021-01-12 NOTE — LETTER
KAREEN FORTE PT  [address] [facility]  [address]  [place] [address]  [phone]    2021    Re: Gerardo Valenzuela   :   1960  MRN:  [id number]   REFERRING PHYSICIAN:   DO KAREEN Box PT  Date of Initial Evaluation:  2021  Visits:  Rxs Used: 7  Reason for Referral:  Right knee pain    EVALUATION SUMMARY    PROGRESS  REPORT  Progress reporting period is from 2020 to 2021.       SUBJECTIVE  Subjective: Patient reports that knee continues to improve.She is walking with the cane indoors and using FWW for longer distances. She continues to sleep on the couch so she doesn't need to do the stairs to get to her upstairs bedroom. Medial  brace has been helpful. Most of her pain continues to be along medial knee.     Current Pain level: 3/10.      Initial Pain level: 9/10.   Changes in function:  Yes (See Goal flowsheet attached for changes in current functional level)  Adverse reaction to treatment or activity: None    OBJECTIVE  Changes noted in objective findings:  Yes     Knee AROM  2021 Extension Flexion   Left WNL WNL   Right 0-2 120     Knee AROM 2020  Extension Flexion   Left WNL WNL   Right 0-11 99       Using both FWW and SPC for mobility   Objective: Improving gait mechanics. Focus on gait mechanics with and without cane. Practiced stairs with SPC and encouraged pt to perform stairs at home so that she can sleep in her own bedroom.      Re: Gerardo Valenzuela   :   1960    ASSESSMENT/PLAN  Updated problem list and treatment plan: Diagnosis 1:  Insufficiency fracture of R tibia, Acute medial mensical tear  Pain   Pain -  hot/cold therapy, self management, education and home program  Decreased strength - therapeutic exercise, therapeutic activities and home program  Impaired gait - gait training, assistive devices and home program  Impaired muscle performance - neuro re-education and home program  Decreased function -  therapeutic activities and home program  STG/LTGs have been met or progress has been made towards goals:  Yes (See Goal flow sheet completed today.)  Assessment of Progress: The patient's condition is improving.  Self Management Plans:  Patient has been instructed in a home treatment program.  I have re-evaluated this patient and find that the nature, scope, duration and intensity of the therapy is appropriate for the medical condition of the patient.  Gerardo continues to require the following intervention to meet STG and LTG's:  PT    Recommendations:  This patient would benefit from continued therapy.     Frequency:  1 X week, once daily  Duration:  for 6 weeks    Please refer to the daily flowsheet for treatment today, total treatment time and time spent performing 1:1 timed codes.        Thank you for your referral.    INQUIRIES  Therapist: Padmini Osuna, PT, DPT   [facility]  [address] [address]  [place] [address]  Phone: [phone]  Fax: [phone]

## 2021-01-12 NOTE — PROGRESS NOTES
Subjective:  HPI  Physical Exam                    Objective:  System    Physical Exam    General     ROS    Assessment/Plan:    PROGRESS  REPORT    Progress reporting period is from 11/12/2020 to 1/12/2021.       SUBJECTIVE  Subjective: Patient reports that knee continues to improve.She is walking with the cane indoors and using FWW for longer distances. She continues to sleep on the couch so she doesn't need to do the stairs to get to her upstairs bedroom. Medial  brace has been helpful. Most of her pain continues to be along medial knee.     Current Pain level: 3/10.      Initial Pain level: 9/10.   Changes in function:  Yes (See Goal flowsheet attached for changes in current functional level)  Adverse reaction to treatment or activity: None    OBJECTIVE  Changes noted in objective findings:  Yes       Knee AROM  1/12/2021 Extension Flexion   Left WNL WNL   Right 0-2 120     Knee AROM 11/12/2020  Extension Flexion   Left WNL WNL   Right 0-11 99       Using both FWW and SPC for mobility   Objective: Improving gait mechanics. Focus on gait mechanics with and without cane. Practiced stairs with SPC and encouraged pt to perform stairs at home so that she can sleep in her own bedroom.    ASSESSMENT/PLAN  Updated problem list and treatment plan: Diagnosis 1:  Insufficiency fracture of R tibia, Acute medial mensical tear  Pain   Pain -  hot/cold therapy, self management, education and home program  Decreased strength - therapeutic exercise, therapeutic activities and home program  Impaired gait - gait training, assistive devices and home program  Impaired muscle performance - neuro re-education and home program  Decreased function - therapeutic activities and home program  STG/LTGs have been met or progress has been made towards goals:  Yes (See Goal flow sheet completed today.)  Assessment of Progress: The patient's condition is improving.  Self Management Plans:  Patient has been instructed in a home treatment  program.  I have re-evaluated this patient and find that the nature, scope, duration and intensity of the therapy is appropriate for the medical condition of the patient.  Gerardo continues to require the following intervention to meet STG and LTG's:  PT    Recommendations:  This patient would benefit from continued therapy.     Frequency:  1 X week, once daily  Duration:  for 6 weeks        Please refer to the daily flowsheet for treatment today, total treatment time and time spent performing 1:1 timed codes.

## 2021-01-13 ENCOUNTER — OFFICE VISIT (OUTPATIENT)
Dept: ORTHOPEDICS | Facility: CLINIC | Age: 61
End: 2021-01-13
Payer: COMMERCIAL

## 2021-01-13 VITALS
SYSTOLIC BLOOD PRESSURE: 118 MMHG | BODY MASS INDEX: 26.81 KG/M2 | DIASTOLIC BLOOD PRESSURE: 64 MMHG | HEIGHT: 61 IN | WEIGHT: 142 LBS

## 2021-01-13 DIAGNOSIS — S83.8X1D ACUTE MEDIAL MENISCAL INJURY OF RIGHT KNEE, SUBSEQUENT ENCOUNTER: ICD-10-CM

## 2021-01-13 DIAGNOSIS — M84.469D INSUFFICIENCY FRACTURE OF TIBIA WITH ROUTINE HEALING, SUBSEQUENT ENCOUNTER: Primary | ICD-10-CM

## 2021-01-13 DIAGNOSIS — M25.561 RIGHT KNEE PAIN, UNSPECIFIED CHRONICITY: ICD-10-CM

## 2021-01-13 PROCEDURE — 99213 OFFICE O/P EST LOW 20 MIN: CPT | Performed by: PEDIATRICS

## 2021-01-13 ASSESSMENT — MIFFLIN-ST. JEOR: SCORE: 1146.49

## 2021-01-13 NOTE — PATIENT INSTRUCTIONS
We discussed the following options:  1) continue with therapy and bracing  2) trial of steroid injection, for pain relief  3) referral to orthopedic surgeon    Following discussion, plan to continue with physical therapy and bracing.  Updated work letter today; will add 6 weeks, given the most recent plan in physical therapy is to continue with PT for an additional 6 weeks.    If desiring to change course in the meantime, and pursue an injection or a referral to see an orthopedic surgeon, contact the clinic.    If you have any further questions for your physician or physician s care team you can call [phone] and use option 3 to leave a voice message. Calls received during business hours will be returned same day.

## 2021-01-13 NOTE — LETTER
REPORT OF WORK ABILITY    NOTE TO EMPLOYEE: You must promptly provide a copy of this report to your  employer or worker's compensation insurer, and Qualified Rehabilitation Consultant.    Date: January 13, 2021                  Employee Name: Geradro Valenzuela         YOB: 1960  Medical Record Number: [id number]   Soc.Sec.No: [id number]  Employer: None                Date of Injury: 9/12/20  Managed Care Organization / Insurance Company Name: UNKNOWN    Diagnosis: right knee injury; tibial plateau insufficiency fracture, meniscal root injury, medial compartment chondral loss  Work Related: yes     MMI: NO  Permanent Partial Disability (PPD) likely: UNKNOWN    EMPLOYEE IS ABLE TO WORK: continue to remain off work at this time     RESTRICTIONS IF ANY:        OTHER RESTRICTIONS: None    TREATMENT PLAN/NOTES: use crutches, cane, or walker for comfort with ambulation. Continue with physical therapy, supportive brace. Plan recheck 6 weeks to assess status, sooner if needed.            Ronen YOUSIF

## 2021-01-13 NOTE — LETTER
1/13/2021         RE: Gerardo Valenzuela  [address] [place] [address]  [place] [address]        Dear Colleague,    Thank you for referring your patient, Gerardo Valenzuela, to the [facility]. Please see a copy of my visit note below.    Sports Medicine Clinic Visit    PCP: Bibiana Najera    Gerardo Valenzuela is a 60 year old female who is seen in f/u up for    Insufficiency fracture of tibia with routine healing, subsequent encounter  Right knee pain, unspecified chronicity  Acute medial meniscal injury of right knee, subsequent encounter. Since last visit on 12/21/2020 patient has continued with PT.  Does feel there has been improvement in her knee pain.  Has continued with the use of the walker when out of the home and a cane inside of her home.  Has also continued to use the knee brace.  Continues to be off of work.        DOI 9/12/20  **  Current leave ends Mon, 1/18/21.  Feels like needs more time. Is making progress with pain, but is concerned won't be back to feeling like can work by Monday.    PT note reviewed. Most recent update yesterday, indicates additional PT planned 6 weeks.      Review of Systems  All other systems reviewed and are negative unless noted above.    Past Medical History:   Diagnosis Date     GERD (gastroesophageal reflux disease)      Mixed hyperlipidemia 8/20/2018     Pelvic mass in female 10/1/2019    Added automatically from request for surgery [id number]     Prediabetes 8/20/2018     Past Surgical History:   Procedure Laterality Date     HEMORRHOIDECTOMY  1981    in [place]     LAPAROSCOPIC SALPINGO-OOPHORECTOMY Bilateral 10/7/2019    Procedure: Diagnostic laparoscopy, exploratory laparotomy, Bilateral SALPINGO-OOPHORECTOMY, lysis of adhesions;  Surgeon: Prashant Sawyer MD;  Location: [facility] OR     Family History   Problem Relation Age of Onset     Cerebrovascular Disease Mother         CVA     Social History     Socioeconomic History     Marital  "status:      Spouse name: Not on file     Number of children: Not on file     Years of education: Not on file     Highest education level: Not on file   Occupational History     Occupation: Housekeeping     Employer: [employer]   Social Needs     Financial resource strain: Not on file     Food insecurity     Worry: Not on file     Inability: Not on file     Transportation needs     Medical: Not on file     Non-medical: Not on file   Tobacco Use     Smoking status: Never Smoker     Smokeless tobacco: Never Used   Substance and Sexual Activity     Alcohol use: No     Drug use: No     Sexual activity: Yes     Partners: Male   Lifestyle     Physical activity     Days per week: Not on file     Minutes per session: Not on file     Stress: Not on file   Relationships     Social connections     Talks on phone: Not on file     Gets together: Not on file     Attends Latter day service: Not on file     Active member of club or organization: Not on file     Attends meetings of clubs or organizations: Not on file     Relationship status: Not on file     Intimate partner violence     Fear of current or ex partner: Not on file     Emotionally abused: Not on file     Physically abused: Not on file     Forced sexual activity: Not on file   Other Topics Concern     Parent/sibling w/ CABG, MI or angioplasty before 65F 55M? No   Social History Narrative     Not on file         Objective  /64   Ht 1.549 m (5' 1\")   Wt 64.4 kg (142 lb)   LMP  (LMP Unknown)   BMI 26.83 kg/m      GENERAL APPEARANCE: healthy, alert and no distress   GAIT: walker  SKIN: no suspicious lesions or rashes  NEURO: Normal strength and tone, mentation intact and speech normal  PSYCH:  mentation appears normal and affect normal/bright  HEENT: no scleral icterus  CV: distal perfusion intact  RESP: nonlabored breathing      Exam  Right knee:  No change in pain with ROM  Wearing  brace currently      Radiology  Previous imaging " reviewed:    XR Knee Right 1/2 Views    Narrative    XR KNEE RT 1 /2 VW 12/21/2020 9:10 AM     HISTORY: knee pain; Insufficiency fracture of tibia with routine  healing, subsequent encounter    COMPARISON: 11/11/2020 . MRI dated 10/23/2020      Impression    IMPRESSION: Joint spaces are maintained. Previously seen sclerosis  along the medial tibial plateau is less well-defined. No discrete  fracture lucency or displacement. No joint effusion.    GABRIEL HERNANDEZ MD       MR right knee without contrast 10/23/2020 10:46 AM     Techniques: Multiplanar multisequence imaging of the right knee was  obtained without administration of intra-articular or intravenous  contrast using routing protocol.     History: evaluate ongoing knee pain after injury, medial joint line  pain and tenderness; Right knee pain, unspecified chronicity     Additional History from EMR: None     Comparison: Radiograph the right knee 9/12/2020     Findings:     MENISCI:  Medial meniscus: Avulsion of the posterior root ligament. There is  intrasubstance T2 signal within the posterior horn and extending  slightly into the body of the meniscus without distinct articular  surface violation. Small parameniscal cysts adjacent to the posterior  root tear.   Lateral meniscus: Intact.     LIGAMENTS  Cruciate ligaments: Intact.  Medial supporting structures: Intact. Bowing of the tibial collateral  ligament secondary to peripheral extrusion of the meniscal body.  Moderate amount of fluid in the pes anserine bursa and small fluid in  the semimembranosus bursa.     Lateral supporting structures: Intact.     EXTENSOR MECHANISM  Intact.     FLUID  Small joint effusion and trace Baker's cyst.     OSSEOUS and ARTICULAR STRUCTURES  Bones: Edema like marrow signal in the medial tibial plateau with  hypointense line, consistent with insufficiency fracture. There is a  lesser extent edema-like marrow signal intensity at the peripheral  medial femoral condyle, likely  "reactive to meniscal pathology.      Otherwise, no fracture or marrow infiltration.     Likely intraosseous ganglion cyst with posterior crucial ligament  distal attachment.     Patellofemoral compartment: Full-thickness chondral fissuring of the  superior median ridge of the patella with underlying subchondral  edema-like signal and cystlike change. Full-thickness chondral fissure  with subchondral cystic change in the inferior medial trochlea.     Medial compartment: High-grade chondral loss of the medial  compartment.     Lateral compartment: No high-grade hyaline cartilage disease.     ANCILLARY FINDINGS  None.                                                                      Impression:  1. Avulsion of the medial meniscus posterior root ligament with medial  tibial plateau insufficiency fracture.  2. 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  3. Pes anserine bursitis, may be reactive to meniscal pathology.     I have personally reviewed the examination and initial interpretation  and I agree with the findings.     SEN MCCRAY      Assessment:  1. Insufficiency fracture of tibia with routine healing, subsequent encounter    2. Right knee pain, unspecified chronicity    3. Acute medial meniscal injury of right knee, subsequent encounter        Plan:  Discussed the assessment with the patient.  Clinically would expect tibial plateau insufficiency fracture to be healed at this point. Likely arthrosis and medial meniscus pathology causing ongoing symptoms.  Following discussion, she prefers to continue with therapy and bracing. Prefers to avoid injection and certainly avoid surgery if possible. We discussed progress to date, and hopefully will continue to improve, though the cartilage issues in the knee will not \"heal\" on their own.  We discussed implications for work; she has been off. Will update letter today, add 6 weeks to " reflect timeline from PT.  Follow up: 6 weeks, sooner prn.  Questions answered. Discussed signs and symptoms that may indicate more serious issues; the patient was instructed to seek appropriate care if noted. Letebrhan indicates understanding of these issues and agrees with the plan.      Ronen Mcmahon DO, CAQ          Patient Instructions   We discussed the following options:  1) continue with therapy and bracing  2) trial of steroid injection, for pain relief  3) referral to orthopedic surgeon    Following discussion, plan to continue with physical therapy and bracing.  Updated work letter today; will add 6 weeks, given the most recent plan in physical therapy is to continue with PT for an additional 6 weeks.    If desiring to change course in the meantime, and pursue an injection or a referral to see an orthopedic surgeon, contact the clinic.    If you have any further questions for your physician or physician s care team you can call [phone] and use option 3 to leave a voice message. Calls received during business hours will be returned same day.            This note consists of symbols derived from keyboarding, dictation and/or voice recognition software. As a result, there may be errors in the script that have gone undetected. Please consider this when interpreting information found in this chart.          Again, thank you for allowing me to participate in the care of your patient.        Sincerely,        Ronen Mcmahon DO

## 2021-01-13 NOTE — PROGRESS NOTES
Sports Medicine Clinic Visit    PCP: Bibiana Najera JOHN Valenzuela is a 60 year old female who is seen in f/u up for    Insufficiency fracture of tibia with routine healing, subsequent encounter  Right knee pain, unspecified chronicity  Acute medial meniscal injury of right knee, subsequent encounter. Since last visit on 12/21/2020 patient has continued with PT.  Does feel there has been improvement in her knee pain.  Has continued with the use of the walker when out of the home and a cane inside of her home.  Has also continued to use the knee brace.  Continues to be off of work.        DOI 9/12/20  **  Current leave ends Mon, 1/18/21.  Feels like needs more time. Is making progress with pain, but is concerned won't be back to feeling like can work by Monday.    PT note reviewed. Most recent update yesterday, indicates additional PT planned 6 weeks.      Review of Systems  All other systems reviewed and are negative unless noted above.    Past Medical History:   Diagnosis Date     GERD (gastroesophageal reflux disease)      Mixed hyperlipidemia 8/20/2018     Pelvic mass in female 10/1/2019    Added automatically from request for surgery [id number]     Prediabetes 8/20/2018     Past Surgical History:   Procedure Laterality Date     HEMORRHOIDECTOMY  1981    in [place]     LAPAROSCOPIC SALPINGO-OOPHORECTOMY Bilateral 10/7/2019    Procedure: Diagnostic laparoscopy, exploratory laparotomy, Bilateral SALPINGO-OOPHORECTOMY, lysis of adhesions;  Surgeon: Prashant Sawyer MD;  Location: [facility]     Family History   Problem Relation Age of Onset     Cerebrovascular Disease Mother         CVA     Social History     Socioeconomic History     Marital status:      Spouse name: Not on file     Number of children: Not on file     Years of education: Not on file     Highest education level: Not on file   Occupational History     Occupation: Housekeeping     Employer: [employer]   [employer] Needs     Financial  "resource strain: Not on file     Food insecurity     Worry: Not on file     Inability: Not on file     Transportation needs     Medical: Not on file     Non-medical: Not on file   Tobacco Use     Smoking status: Never Smoker     Smokeless tobacco: Never Used   Substance and Sexual Activity     Alcohol use: No     Drug use: No     Sexual activity: Yes     Partners: Male   Lifestyle     Physical activity     Days per week: Not on file     Minutes per session: Not on file     Stress: Not on file   Relationships     Social connections     Talks on phone: Not on file     Gets together: Not on file     Attends Jew service: Not on file     Active member of club or organization: Not on file     Attends meetings of clubs or organizations: Not on file     Relationship status: Not on file     Intimate partner violence     Fear of current or ex partner: Not on file     Emotionally abused: Not on file     Physically abused: Not on file     Forced sexual activity: Not on file   Other Topics Concern     Parent/sibling w/ CABG, MI or angioplasty before 65F 55M? No   Social History Narrative     Not on file         Objective  /64   Ht 1.549 m (5' 1\")   Wt 64.4 kg (142 lb)   LMP  (LMP Unknown)   BMI 26.83 kg/m      GENERAL APPEARANCE: healthy, alert and no distress   GAIT: walker  SKIN: no suspicious lesions or rashes  NEURO: Normal strength and tone, mentation intact and speech normal  PSYCH:  mentation appears normal and affect normal/bright  HEENT: no scleral icterus  CV: distal perfusion intact  RESP: nonlabored breathing      Exam  Right knee:  No change in pain with ROM  Wearing  brace currently      Radiology  Previous imaging reviewed:    XR Knee Right 1/2 Views    Narrative    XR KNEE RT 1 /2 VW 12/21/2020 9:10 AM     HISTORY: knee pain; Insufficiency fracture of tibia with routine  healing, subsequent encounter    COMPARISON: 11/11/2020 . MRI dated 10/23/2020      Impression    IMPRESSION: Joint " spaces are maintained. Previously seen sclerosis  along the medial tibial plateau is less well-defined. No discrete  fracture lucency or displacement. No joint effusion.    GABRIEL HERNANDEZ MD       MR right knee without contrast 10/23/2020 10:46 AM     Techniques: Multiplanar multisequence imaging of the right knee was  obtained without administration of intra-articular or intravenous  contrast using routing protocol.     History: evaluate ongoing knee pain after injury, medial joint line  pain and tenderness; Right knee pain, unspecified chronicity     Additional History from EMR: None     Comparison: Radiograph the right knee 9/12/2020     Findings:     MENISCI:  Medial meniscus: Avulsion of the posterior root ligament. There is  intrasubstance T2 signal within the posterior horn and extending  slightly into the body of the meniscus without distinct articular  surface violation. Small parameniscal cysts adjacent to the posterior  root tear.   Lateral meniscus: Intact.     LIGAMENTS  Cruciate ligaments: Intact.  Medial supporting structures: Intact. Bowing of the tibial collateral  ligament secondary to peripheral extrusion of the meniscal body.  Moderate amount of fluid in the pes anserine bursa and small fluid in  the semimembranosus bursa.     Lateral supporting structures: Intact.     EXTENSOR MECHANISM  Intact.     FLUID  Small joint effusion and trace Baker's cyst.     OSSEOUS and ARTICULAR STRUCTURES  Bones: Edema like marrow signal in the medial tibial plateau with  hypointense line, consistent with insufficiency fracture. There is a  lesser extent edema-like marrow signal intensity at the peripheral  medial femoral condyle, likely reactive to meniscal pathology.      Otherwise, no fracture or marrow infiltration.     Likely intraosseous ganglion cyst with posterior crucial ligament  distal attachment.     Patellofemoral compartment: Full-thickness chondral fissuring of the  superior median ridge of the  "patella with underlying subchondral  edema-like signal and cystlike change. Full-thickness chondral fissure  with subchondral cystic change in the inferior medial trochlea.     Medial compartment: High-grade chondral loss of the medial  compartment.     Lateral compartment: No high-grade hyaline cartilage disease.     ANCILLARY FINDINGS  None.                                                                      Impression:  1. Avulsion of the medial meniscus posterior root ligament with medial  tibial plateau insufficiency fracture.  2. 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  3. Pes anserine bursitis, may be reactive to meniscal pathology.     I have personally reviewed the examination and initial interpretation  and I agree with the findings.     SEN MCCRAY      Assessment:  1. Insufficiency fracture of tibia with routine healing, subsequent encounter    2. Right knee pain, unspecified chronicity    3. Acute medial meniscal injury of right knee, subsequent encounter        Plan:  Discussed the assessment with the patient.  Clinically would expect tibial plateau insufficiency fracture to be healed at this point. Likely arthrosis and medial meniscus pathology causing ongoing symptoms.  Following discussion, she prefers to continue with therapy and bracing. Prefers to avoid injection and certainly avoid surgery if possible. We discussed progress to date, and hopefully will continue to improve, though the cartilage issues in the knee will not \"heal\" on their own.  We discussed implications for work; she has been off. Will update letter today, add 6 weeks to reflect timeline from PT.  Follow up: 6 weeks, sooner prn.  Questions answered. Discussed signs and symptoms that may indicate more serious issues; the patient was instructed to seek appropriate care if noted. Gerardo indicates understanding of these issues and agrees with the " plan.      Ronen Mcmahon DO, CAQ          Patient Instructions   We discussed the following options:  1) continue with therapy and bracing  2) trial of steroid injection, for pain relief  3) referral to orthopedic surgeon    Following discussion, plan to continue with physical therapy and bracing.  Updated work letter today; will add 6 weeks, given the most recent plan in physical therapy is to continue with PT for an additional 6 weeks.    If desiring to change course in the meantime, and pursue an injection or a referral to see an orthopedic surgeon, contact the clinic.    If you have any further questions for your physician or physician s care team you can call [phone] and use option 3 to leave a voice message. Calls received during business hours will be returned same day.            This note consists of symbols derived from keyboarding, dictation and/or voice recognition software. As a result, there may be errors in the script that have gone undetected. Please consider this when interpreting information found in this chart.

## 2021-01-15 ENCOUNTER — HEALTH MAINTENANCE LETTER (OUTPATIENT)
Age: 61
End: 2021-01-15

## 2021-01-19 ENCOUNTER — THERAPY VISIT (OUTPATIENT)
Dept: PHYSICAL THERAPY | Facility: CLINIC | Age: 61
End: 2021-01-19
Payer: COMMERCIAL

## 2021-01-19 DIAGNOSIS — M25.561 RIGHT KNEE PAIN: ICD-10-CM

## 2021-01-19 PROCEDURE — 97110 THERAPEUTIC EXERCISES: CPT | Mod: GP | Performed by: PHYSICAL THERAPIST

## 2021-01-19 PROCEDURE — 97112 NEUROMUSCULAR REEDUCATION: CPT | Mod: GP | Performed by: PHYSICAL THERAPIST

## 2021-02-08 ENCOUNTER — THERAPY VISIT (OUTPATIENT)
Dept: PHYSICAL THERAPY | Facility: CLINIC | Age: 61
End: 2021-02-08
Payer: COMMERCIAL

## 2021-02-08 DIAGNOSIS — M25.561 RIGHT KNEE PAIN: ICD-10-CM

## 2021-02-08 PROCEDURE — 97110 THERAPEUTIC EXERCISES: CPT | Mod: GP | Performed by: PHYSICAL THERAPIST

## 2021-02-19 ENCOUNTER — THERAPY VISIT (OUTPATIENT)
Dept: PHYSICAL THERAPY | Facility: CLINIC | Age: 61
End: 2021-02-19
Payer: COMMERCIAL

## 2021-02-19 DIAGNOSIS — M25.561 RIGHT KNEE PAIN: ICD-10-CM

## 2021-02-19 PROCEDURE — 97110 THERAPEUTIC EXERCISES: CPT | Mod: GP | Performed by: PHYSICAL THERAPIST

## 2021-02-19 NOTE — PROGRESS NOTES
Subjective:  HPI  Physical Exam                    Objective:  System    Physical Exam    General     ROS      PROGRESS  REPORT    Progress reporting period is from 11/12/2020 to 2/19/2021.       SUBJECTIVE   Subjective: Patient reports that she is mostly using SPC for mobility. She is walking for 30 minutes with cane and taking a break half way between. She wears  brace anytime she is on her feet and she feels this has been helpful. Stairs are still difficult/painful for her.     Current Pain level: 3/10.      Initial Pain level: 9/10.   Changes in function:  Yes (See Goal flowsheet attached for changes in current functional level)  Adverse reaction to treatment or activity: None    OBJECTIVE  Changes noted in objective findings:  Yes,   Knee AROM-2/19/21 Extension Flexion   Left WNL WNL   Right 0 121       Knee AROM-11/12/2020 Extension Flexion   Left WNL WNL   Right 0-11 99         Objective: Continued strengthening program. Improved motion. Pt very motivated and does exercises daily.      ASSESSMENT/PLAN  Updated problem list and treatment plan: Diagnosis 1:  R tibial plateau fracture, mensicus  Pain -  hot/cold therapy, self management and home program  Decreased strength - therapeutic exercise, therapeutic activities and home program  Impaired gait - gait training, assistive devices and home program  Impaired muscle performance - neuro re-education and home program  Decreased function - therapeutic activities and home program  STG/LTGs have been met or progress has been made towards goals:  Yes (See Goal flow sheet completed today.)  Assessment of Progress: The patient's condition is improving.  Self Management Plans:  Patient has been instructed in a home treatment program.  I have re-evaluated this patient and find that the nature, scope, duration and intensity of the therapy is appropriate for the medical condition of the patient.  Gerardo continues to require the following intervention to meet  STG and LTG's:  PT    Recommendations:  This patient would benefit from continued therapy.     Frequency:  2 X a month, once daily  Duration:  for 1-2 months        Please refer to the daily flowsheet for treatment today, total treatment time and time spent performing 1:1 timed codes.

## 2021-02-19 NOTE — LETTER
[facility]  [address]  [address]  [place] [address]  [phone]    2021    Re: Gerardo Valenzuela   :   1960  MRN:  [id number]   REFERRING PHYSICIAN:   DO KAREEN oBx PT  Date of Initial Evaluation:  2020  Visits:  Rxs Used: 10  Reason for Referral:  Right knee pain    EVALUATION SUMMARY    PROGRESS  REPORT  Progress reporting period is from 2020 to 2021.       SUBJECTIVE   Subjective: Patient reports that she is mostly using SPC for mobility. She is walking for 30 minutes with cane and taking a break half way between. She wears  brace anytime she is on her feet and she feels this has been helpful. Stairs are still difficult/painful for her.     Current Pain level: 3/10.      Initial Pain level: 9/10.   Changes in function:  Yes (See Goal flowsheet attached for changes in current functional level)  Adverse reaction to treatment or activity: None    OBJECTIVE  Changes noted in objective findings:  Yes,   Knee AROM-21 Extension Flexion   Left WNL WNL   Right 0 121       Knee AROM-2020 Extension Flexion   Left WNL WNL   Right 0-11 99     Objective: Continued strengthening program. Improved motion. Pt very motivated and does exercises daily.      ASSESSMENT/PLAN  Updated problem list and treatment plan: Diagnosis 1:  R tibial plateau fracture, mensicus  Pain -  hot/cold therapy, self management and home program  Re: Gerardo Valenzuela   :   1960    Decreased strength - therapeutic exercise, therapeutic activities and home program  Impaired gait - gait training, assistive devices and home program  Impaired muscle performance - neuro re-education and home program  Decreased function - therapeutic activities and home program  STG/LTGs have been met or progress has been made towards goals:  Yes (See Goal flow sheet completed today.)  Assessment of Progress: The patient's condition is improving.  Self Management  Plans:  Patient has been instructed in a home treatment program.  I have re-evaluated this patient and find that the nature, scope, duration and intensity of the therapy is appropriate for the medical condition of the patient.  Gerardo continues to require the following intervention to meet STG and LTG's:  PT    Recommendations:  This patient would benefit from continued therapy.     Frequency:  2 X a month, once daily  Duration:  for 1-2 months      Please refer to the daily flowsheet for treatment today, total treatment time and time spent performing 1:1 timed codes.        Thank you for your referral.    INQUIRIES  Therapist: Padmini Osuna, PT, DPT   [facility]  [address]  [address]  [place] [address]  Phone: [phone]  Fax: [phone]

## 2021-02-22 ENCOUNTER — OFFICE VISIT (OUTPATIENT)
Dept: ORTHOPEDICS | Facility: CLINIC | Age: 61
End: 2021-02-22
Payer: OTHER MISCELLANEOUS

## 2021-02-22 VITALS
WEIGHT: 142 LBS | HEIGHT: 61 IN | BODY MASS INDEX: 26.81 KG/M2 | DIASTOLIC BLOOD PRESSURE: 70 MMHG | SYSTOLIC BLOOD PRESSURE: 128 MMHG

## 2021-02-22 DIAGNOSIS — M84.469D INSUFFICIENCY FRACTURE OF TIBIA WITH ROUTINE HEALING, SUBSEQUENT ENCOUNTER: Primary | ICD-10-CM

## 2021-02-22 DIAGNOSIS — S83.8X1D ACUTE MEDIAL MENISCAL INJURY OF RIGHT KNEE, SUBSEQUENT ENCOUNTER: ICD-10-CM

## 2021-02-22 DIAGNOSIS — M25.561 RIGHT KNEE PAIN, UNSPECIFIED CHRONICITY: ICD-10-CM

## 2021-02-22 PROCEDURE — 99214 OFFICE O/P EST MOD 30 MIN: CPT | Performed by: PEDIATRICS

## 2021-02-22 ASSESSMENT — MIFFLIN-ST. JEOR: SCORE: 1146.49

## 2021-02-22 NOTE — LETTER
2/22/2021         RE: Gerardo Valenzuela  [address]  [place] [address]        Dear Colleague,    Thank you for referring your patient, Gerardo Valenzuela, to the [facility]. Please see a copy of my visit note below.    Sports Medicine Clinic Visit    PCP: Bibiana Najera    Gerardo Valenzuela is a 60 year old female who is seen in f/u up for    Insufficiency fracture of tibia with routine healing, subsequent encounter  Right knee pain, unspecified chronicity  Acute medial meniscal injury of right knee, subsequent encounter. Since last visit on 1/13/2021 patient has continued with PT.  Does feel there has been improvement in her knee pain.  Continues to be off of work.    Has been ambulating with the cane for the past 2 weeks, both at home and out of the house.  .     DOI 9/12/20  **  Interested in considering light duty if available, and interested in discussing further with ortho surgery.    Short term disability is still pending. Apparently not enough detail in previous notes. Reviewed most recent form completed here, from 2/8/21. Some restrictions there. Also pt had signed PILY for more information; this is scanned in to chart already (see media tab). Not clear where that is in the records release process.      Review of Systems  All other systems reviewed and are negative unless noted above.    Past Medical History:   Diagnosis Date     GERD (gastroesophageal reflux disease)      Mixed hyperlipidemia 8/20/2018     Pelvic mass in female 10/1/2019    Added automatically from request for surgery [id number]     Prediabetes 8/20/2018     Past Surgical History:   Procedure Laterality Date     HEMORRHOIDECTOMY  1981    in [place]     LAPAROSCOPIC SALPINGO-OOPHORECTOMY Bilateral 10/7/2019    Procedure: Diagnostic laparoscopy, exploratory laparotomy, Bilateral SALPINGO-OOPHORECTOMY, lysis of adhesions;  Surgeon: Prashant Sawyer MD;  Location: [facility]     Family History  "  Problem Relation Age of Onset     Cerebrovascular Disease Mother         CVA     Social History     Socioeconomic History     Marital status:      Spouse name: Not on file     Number of children: Not on file     Years of education: Not on file     Highest education level: Not on file   Occupational History     Occupation: Housekeeping     Employer: [employer]   Social Needs     Financial resource strain: Not on file     Food insecurity     Worry: Not on file     Inability: Not on file     Transportation needs     Medical: Not on file     Non-medical: Not on file   Tobacco Use     Smoking status: Never Smoker     Smokeless tobacco: Never Used   Substance and Sexual Activity     Alcohol use: No     Drug use: No     Sexual activity: Yes     Partners: Male   Lifestyle     Physical activity     Days per week: Not on file     Minutes per session: Not on file     Stress: Not on file   Relationships     Social connections     Talks on phone: Not on file     Gets together: Not on file     Attends Mosque service: Not on file     Active member of club or organization: Not on file     Attends meetings of clubs or organizations: Not on file     Relationship status: Not on file     Intimate partner violence     Fear of current or ex partner: Not on file     Emotionally abused: Not on file     Physically abused: Not on file     Forced sexual activity: Not on file   Other Topics Concern     Parent/sibling w/ CABG, MI or angioplasty before 65F 55M? No   Social History Narrative     Not on file         Objective  /70   Ht 1.549 m (5' 1\")   Wt 64.4 kg (142 lb)   LMP  (LMP Unknown)   BMI 26.83 kg/m      GENERAL APPEARANCE: healthy, alert and no distress   GAIT: cane  SKIN: no suspicious lesions or rashes  NEURO: Normal strength and tone, mentation intact and speech normal  PSYCH:  mentation appears normal and affect normal/bright  HEENT: no scleral icterus  CV: distal perfusion intact  RESP: nonlabored " breathing      Exam  Right knee  brace in place  Motion 0 to > 100, no change with seated ROM      Radiology  Previous imaging reviewed briefly again:    XR KNEE RT 1 /2 VW 12/21/2020 9:10 AM      HISTORY: knee pain; Insufficiency fracture of tibia with routine  healing, subsequent encounter     COMPARISON: 11/11/2020 . MRI dated 10/23/2020                                                                      IMPRESSION: Joint spaces are maintained. Previously seen sclerosis  along the medial tibial plateau is less well-defined. No discrete  fracture lucency or displacement. No joint effusion.     GABRIEL HERNANDEZ MD      RIGHT KNEE 1/2 VIEW(S) 11/11/2020 9:40 AM     INDICATION: Right knee pain. Medial tibial plateau subchondral  fracture.     COMPARISON: 10/23/2020 MRI. 9/12/2020.                                                                      IMPRESSION:  1.  There is subtle new sclerosis in the right medial tibial plateau,  likely related to healing response from the nondisplaced subchondral  fracture.  2.  No additional areas suspicious for healing or acute fracture.  3.  Normal knee joint spacing and alignment.  4.  No joint effusion.        MARQUITA JOSEPH MD      MR right knee without contrast 10/23/2020 10:46 AM     Techniques: Multiplanar multisequence imaging of the right knee was  obtained without administration of intra-articular or intravenous  contrast using routing protocol.     History: evaluate ongoing knee pain after injury, medial joint line  pain and tenderness; Right knee pain, unspecified chronicity     Additional History from EMR: None     Comparison: Radiograph the right knee 9/12/2020     Findings:     MENISCI:  Medial meniscus: Avulsion of the posterior root ligament. There is  intrasubstance T2 signal within the posterior horn and extending  slightly into the body of the meniscus without distinct articular  surface violation. Small parameniscal cysts adjacent to the  posterior  root tear.   Lateral meniscus: Intact.     LIGAMENTS  Cruciate ligaments: Intact.  Medial supporting structures: Intact. Bowing of the tibial collateral  ligament secondary to peripheral extrusion of the meniscal body.  Moderate amount of fluid in the pes anserine bursa and small fluid in  the semimembranosus bursa.     Lateral supporting structures: Intact.     EXTENSOR MECHANISM  Intact.     FLUID  Small joint effusion and trace Baker's cyst.     OSSEOUS and ARTICULAR STRUCTURES  Bones: Edema like marrow signal in the medial tibial plateau with  hypointense line, consistent with insufficiency fracture. There is a  lesser extent edema-like marrow signal intensity at the peripheral  medial femoral condyle, likely reactive to meniscal pathology.      Otherwise, no fracture or marrow infiltration.     Likely intraosseous ganglion cyst with posterior crucial ligament  distal attachment.     Patellofemoral compartment: Full-thickness chondral fissuring of the  superior median ridge of the patella with underlying subchondral  edema-like signal and cystlike change. Full-thickness chondral fissure  with subchondral cystic change in the inferior medial trochlea.     Medial compartment: High-grade chondral loss of the medial  compartment.     Lateral compartment: No high-grade hyaline cartilage disease.     ANCILLARY FINDINGS  None.                                                                      Impression:  1. Avulsion of the medial meniscus posterior root ligament with medial  tibial plateau insufficiency fracture.  2. 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  3. Pes anserine bursitis, may be reactive to meniscal pathology.     I have personally reviewed the examination and initial interpretation  and I agree with the findings.     SEN MCCRAY         KNEE RIGHT THREE VIEWS    9/12/2020 7:55 PM      HISTORY: Right knee pain,  unspecified chronicity.     COMPARISON: None.                                                                      IMPRESSION: Joint spaces are preserved. No evidence of acute fracture  or joint effusion. Prominence of bone on the lateral aspect of lateral  femoral condyle with a small 7.5 cm elliptically shaped defect. This  is likely residua from prior trauma.     CLEMENTINA HEATH MD      Assessment:  1. Insufficiency fracture of tibia with routine healing, subsequent encounter    2. Right knee pain, unspecified chronicity    3. Acute medial meniscal injury of right knee, subsequent encounter        Plan:  Discussed the assessment with the patient.  The insufficiency fracture would consider healed, given timeframe from injury, as well as x-ray findings. She has continued symptoms in the knee, despite therapy and bracing; previous MRI had shown medial meniscus root avulsion.  Reviewed options again, including therapy (doing), bracing (doing), activity modification (doing), trial of injection (for pain relief), and referral to ortho surgeon. We had discussed these in past, and previously she had wanted to avoid injection and surgery. Injection offered previously, and again, if she is interested. However, she is now more in favor of referral to discuss further; ortho  referral placed to discuss more with ortho surgeon.  She will continue with bracing; ambulatory aid prn.  May continue with physical therapy, given plan from most recent PT note.  We discussed potential for repeat MRI as well. Given plan for referral, will defer to ortho surgery on whether that would be worthwhile.  Updated work letter. She feels ready to return to some restricted work. Light duty letter written.  Follow up: here will leave as needed.  Questions answered. Discussed signs and symptoms that may indicate more serious issues; the patient was instructed to seek appropriate care if noted. Letebrhan indicates understanding of these  issues and agrees with the plan.      Ronen Mcmahon DO, CAQ          Note: Time spent in one-on-one evaluation and discussion with the patient regarding the nature of problem, course, prior treatments, and therapeutic options, along with review of medical record (including outside sources/documentation), and completing documentation: 30 minutes.        This note consists of symbols derived from keyboarding, dictation and/or voice recognition software. As a result, there may be errors in the script that have gone undetected. Please consider this when interpreting information found in this chart.          Again, thank you for allowing me to participate in the care of your patient.        Sincerely,        Ronen Mcmahon DO

## 2021-02-22 NOTE — PROGRESS NOTES
Sports Medicine Clinic Visit    PCP: Bibiana Najera JOHN Valenzuela is a 60 year old female who is seen in f/u up for    Insufficiency fracture of tibia with routine healing, subsequent encounter  Right knee pain, unspecified chronicity  Acute medial meniscal injury of right knee, subsequent encounter. Since last visit on 1/13/2021 patient has continued with PT.  Does feel there has been improvement in her knee pain.  Continues to be off of work.    Has been ambulating with the cane for the past 2 weeks, both at home and out of the house.  .     DOI 9/12/20  **  Interested in considering light duty if available, and interested in discussing further with ortho surgery.    Short term disability is still pending. Apparently not enough detail in previous notes. Reviewed most recent form completed here, from 2/8/21. Some restrictions there. Also pt had signed PILY for more information; this is scanned in to chart already (see media tab). Not clear where that is in the records release process.      Review of Systems  All other systems reviewed and are negative unless noted above.    Past Medical History:   Diagnosis Date     GERD (gastroesophageal reflux disease)      Mixed hyperlipidemia 8/20/2018     Pelvic mass in female 10/1/2019    Added automatically from request for surgery [id number]     Prediabetes 8/20/2018     Past Surgical History:   Procedure Laterality Date     HEMORRHOIDECTOMY  1981    in [place]     LAPAROSCOPIC SALPINGO-OOPHORECTOMY Bilateral 10/7/2019    Procedure: Diagnostic laparoscopy, exploratory laparotomy, Bilateral SALPINGO-OOPHORECTOMY, lysis of adhesions;  Surgeon: Prashant Sawyer MD;  Location: [facility]     Family History   Problem Relation Age of Onset     Cerebrovascular Disease Mother         CVA     Social History     Socioeconomic History     Marital status:      Spouse name: Not on file     Number of children: Not on file     Years of education: Not on file     Highest  "education level: Not on file   Occupational History     Occupation: Housekeeping     Employer: [employer]   Social Needs     Financial resource strain: Not on file     Food insecurity     Worry: Not on file     Inability: Not on file     Transportation needs     Medical: Not on file     Non-medical: Not on file   Tobacco Use     Smoking status: Never Smoker     Smokeless tobacco: Never Used   Substance and Sexual Activity     Alcohol use: No     Drug use: No     Sexual activity: Yes     Partners: Male   Lifestyle     Physical activity     Days per week: Not on file     Minutes per session: Not on file     Stress: Not on file   Relationships     Social connections     Talks on phone: Not on file     Gets together: Not on file     Attends Orthodoxy service: Not on file     Active member of club or organization: Not on file     Attends meetings of clubs or organizations: Not on file     Relationship status: Not on file     Intimate partner violence     Fear of current or ex partner: Not on file     Emotionally abused: Not on file     Physically abused: Not on file     Forced sexual activity: Not on file   Other Topics Concern     Parent/sibling w/ CABG, MI or angioplasty before 65F 55M? No   Social History Narrative     Not on file         Objective  /70   Ht 1.549 m (5' 1\")   Wt 64.4 kg (142 lb)   LMP  (LMP Unknown)   BMI 26.83 kg/m      GENERAL APPEARANCE: healthy, alert and no distress   GAIT: cane  SKIN: no suspicious lesions or rashes  NEURO: Normal strength and tone, mentation intact and speech normal  PSYCH:  mentation appears normal and affect normal/bright  HEENT: no scleral icterus  CV: distal perfusion intact  RESP: nonlabored breathing      Exam  Right knee  brace in place  Motion 0 to > 100, no change with seated ROM      Radiology  Previous imaging reviewed briefly again:    XR KNEE RT 1 /2 VW 12/21/2020 9:10 AM      HISTORY: knee pain; Insufficiency fracture of tibia with " routine  healing, subsequent encounter     COMPARISON: 11/11/2020 . MRI dated 10/23/2020                                                                      IMPRESSION: Joint spaces are maintained. Previously seen sclerosis  along the medial tibial plateau is less well-defined. No discrete  fracture lucency or displacement. No joint effusion.     GABRIEL HERNANDEZ MD      RIGHT KNEE 1/2 VIEW(S) 11/11/2020 9:40 AM     INDICATION: Right knee pain. Medial tibial plateau subchondral  fracture.     COMPARISON: 10/23/2020 MRI. 9/12/2020.                                                                      IMPRESSION:  1.  There is subtle new sclerosis in the right medial tibial plateau,  likely related to healing response from the nondisplaced subchondral  fracture.  2.  No additional areas suspicious for healing or acute fracture.  3.  Normal knee joint spacing and alignment.  4.  No joint effusion.        MARQUITA JOSEPH MD      MR right knee without contrast 10/23/2020 10:46 AM     Techniques: Multiplanar multisequence imaging of the right knee was  obtained without administration of intra-articular or intravenous  contrast using routing protocol.     History: evaluate ongoing knee pain after injury, medial joint line  pain and tenderness; Right knee pain, unspecified chronicity     Additional History from EMR: None     Comparison: Radiograph the right knee 9/12/2020     Findings:     MENISCI:  Medial meniscus: Avulsion of the posterior root ligament. There is  intrasubstance T2 signal within the posterior horn and extending  slightly into the body of the meniscus without distinct articular  surface violation. Small parameniscal cysts adjacent to the posterior  root tear.   Lateral meniscus: Intact.     LIGAMENTS  Cruciate ligaments: Intact.  Medial supporting structures: Intact. Bowing of the tibial collateral  ligament secondary to peripheral extrusion of the meniscal body.  Moderate amount of fluid in the pes  anserine bursa and small fluid in  the semimembranosus bursa.     Lateral supporting structures: Intact.     EXTENSOR MECHANISM  Intact.     FLUID  Small joint effusion and trace Baker's cyst.     OSSEOUS and ARTICULAR STRUCTURES  Bones: Edema like marrow signal in the medial tibial plateau with  hypointense line, consistent with insufficiency fracture. There is a  lesser extent edema-like marrow signal intensity at the peripheral  medial femoral condyle, likely reactive to meniscal pathology.      Otherwise, no fracture or marrow infiltration.     Likely intraosseous ganglion cyst with posterior crucial ligament  distal attachment.     Patellofemoral compartment: Full-thickness chondral fissuring of the  superior median ridge of the patella with underlying subchondral  edema-like signal and cystlike change. Full-thickness chondral fissure  with subchondral cystic change in the inferior medial trochlea.     Medial compartment: High-grade chondral loss of the medial  compartment.     Lateral compartment: No high-grade hyaline cartilage disease.     ANCILLARY FINDINGS  None.                                                                      Impression:  1. Avulsion of the medial meniscus posterior root ligament with medial  tibial plateau insufficiency fracture.  2. 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  3. Pes anserine bursitis, may be reactive to meniscal pathology.     I have personally reviewed the examination and initial interpretation  and I agree with the findings.     SEN MCCRAY         KNEE RIGHT THREE VIEWS    9/12/2020 7:55 PM      HISTORY: Right knee pain, unspecified chronicity.     COMPARISON: None.                                                                      IMPRESSION: Joint spaces are preserved. No evidence of acute fracture  or joint effusion. Prominence of bone on the lateral aspect of lateral  femoral  condyle with a small 7.5 cm elliptically shaped defect. This  is likely residua from prior trauma.     CLEMENTINA HEATH MD      Assessment:  1. Insufficiency fracture of tibia with routine healing, subsequent encounter    2. Right knee pain, unspecified chronicity    3. Acute medial meniscal injury of right knee, subsequent encounter        Plan:  Discussed the assessment with the patient.  The insufficiency fracture would consider healed, given timeframe from injury, as well as x-ray findings. She has continued symptoms in the knee, despite therapy and bracing; previous MRI had shown medial meniscus root avulsion.  Reviewed options again, including therapy (doing), bracing (doing), activity modification (doing), trial of injection (for pain relief), and referral to ortho surgeon. We had discussed these in past, and previously she had wanted to avoid injection and surgery. Injection offered previously, and again, if she is interested. However, she is now more in favor of referral to discuss further; ortho  referral placed to discuss more with ortho surgeon.  She will continue with bracing; ambulatory aid prn.  May continue with physical therapy, given plan from most recent PT note.  We discussed potential for repeat MRI as well. Given plan for referral, will defer to ortho surgery on whether that would be worthwhile.  Updated work letter. She feels ready to return to some restricted work. Light duty letter written.  Follow up: here will leave as needed.  Questions answered. Discussed signs and symptoms that may indicate more serious issues; the patient was instructed to seek appropriate care if noted. Letebrhan indicates understanding of these issues and agrees with the plan.      Ronen Mcmahon, , CAQ          Note: Time spent in one-on-one evaluation and discussion with the patient regarding the nature of problem, course, prior treatments, and therapeutic options, along with review of medical record  (including outside sources/documentation), and completing documentation: 30 minutes.        This note consists of symbols derived from keyboarding, dictation and/or voice recognition software. As a result, there may be errors in the script that have gone undetected. Please consider this when interpreting information found in this chart.

## 2021-02-22 NOTE — LETTER
REPORT OF WORK ABILITY    NOTE TO EMPLOYEE: You must promptly provide a copy of this report to your  employer or worker's compensation insurer, and Qualified Rehabilitation Consultant.    Date: February 22, 2021                   Employee Name: Gerardo Valenzuela         YOB: 1960  Medical Record Number: [id number]   Soc.Sec.No: [id number]  Employer: None                Date of Injury: 9/12/20  Managed Care Organization / Insurance Company Name: UNKNOWN    Diagnosis: right knee injury, medial meniscus posterior root tear, and medial tibial plateau insufficiency fracture  Work Related: yes     MMI: NO  Permanent Partial Disability (PPD) likely: UNKNOWN    EMPLOYEE IS ABLE TO WORK: Return to work with restrictions on next scheduled work date, with restrictions outlined below.     RESTRICTIONS IF ANY:    Stand:  Occasionally (2-4 hours); maximum 30 minutes duration consecutively, then take seated break; ambulatory aid as needed  Sit:  Full time is ok  Walk: Occasionally (2-4 hours); maximum 30 minutes duration consecutively, then take seated break; ambulatory aid as needed  Kneel/[place]:  Not at all (0 hours)  Lift, carry:  Up to 20 lbs, unless using an ambulatory aid, then would be none  Push/Pull:  Up to 20 lbs, unless using an ambulatory aid, then would be none      OTHER RESTRICTIONS: None    TREATMENT PLAN/NOTES: change work position for comfort, best work height mid chest to mid thigh, may need to restrict work activities for comfort, may continue with knee brace and cold pack for comfort, Elevate and Rest if needed. Continue with physical therapy. Also plan to refer to see orthopedic surgeon.          Ronen YOUSIF

## 2021-02-26 ENCOUNTER — OFFICE VISIT (OUTPATIENT)
Dept: ORTHOPEDICS | Facility: CLINIC | Age: 61
End: 2021-02-26
Payer: OTHER MISCELLANEOUS

## 2021-02-26 VITALS
OXYGEN SATURATION: 97 % | WEIGHT: 152 LBS | HEART RATE: 90 BPM | RESPIRATION RATE: 16 BRPM | BODY MASS INDEX: 28.72 KG/M2 | DIASTOLIC BLOOD PRESSURE: 76 MMHG | SYSTOLIC BLOOD PRESSURE: 126 MMHG

## 2021-02-26 DIAGNOSIS — G89.29 CHRONIC PAIN OF RIGHT KNEE: ICD-10-CM

## 2021-02-26 DIAGNOSIS — M25.561 CHRONIC PAIN OF RIGHT KNEE: ICD-10-CM

## 2021-02-26 DIAGNOSIS — M23.203 OLD COMPLEX TEAR OF MEDIAL MENISCUS OF RIGHT KNEE: ICD-10-CM

## 2021-02-26 DIAGNOSIS — M17.11 PRIMARY OSTEOARTHRITIS OF RIGHT KNEE: Primary | ICD-10-CM

## 2021-02-26 PROCEDURE — 99203 OFFICE O/P NEW LOW 30 MIN: CPT | Mod: 25 | Performed by: ORTHOPAEDIC SURGERY

## 2021-02-26 PROCEDURE — 20610 DRAIN/INJ JOINT/BURSA W/O US: CPT | Mod: RT | Performed by: ORTHOPAEDIC SURGERY

## 2021-02-26 RX ADMIN — BUPIVACAINE HYDROCHLORIDE 4 ML: 2.5 INJECTION, SOLUTION INFILTRATION; PERINEURAL at 10:19

## 2021-02-26 RX ADMIN — METHYLPREDNISOLONE ACETATE 80 MG: 80 INJECTION, SUSPENSION INTRA-ARTICULAR; INTRALESIONAL; INTRAMUSCULAR; SOFT TISSUE at 10:19

## 2021-02-26 ASSESSMENT — PAIN SCALES - GENERAL: PAINLEVEL: MODERATE PAIN (4)

## 2021-02-26 NOTE — PROGRESS NOTES
"CHIEF COMPLAINT:   Chief Complaint   Patient presents with     Knee right     Right knee injury DOI: 9/12/2020. About 3 weeks ago she was walking heard a pop in front of knee then was not able to walk/bear weight, states that pain went up to back and down right leg. She took ibuprofen 800 mg. She is walking for 30 minutes with cane and taking a break half way between. She wears  brace anytime she is on her feet and she feels this has been helpful. Stairs are still difficult/painful for her.          Work Comp   .  Gerardo Valenzuela is [facility] for evaluation of right knee pain, injury at the request of Dr. Ronen Mcmahon            HISTORY OF PRESENT ILLNESS    Gerardo Valenzuela is a 60 year old female seen for evaluation of ongoing right knee pain with a known injury. Reports pain from injury 9/12/2020.  was walking and hit leg/knee on something and had pain shoot down the leg. A few weeks before then was moving a refrigerator and felt pain in the back/hip and down to the knee. Treated with ice, hot pack. Continued to work. Had pain with walking. Noted to have possible insufficiency subchondral fracture, complex medial meniscus tear. Treated conservatively for subchondral insufficiency fracture. Seems to have healed.. About 3 weeks ago was walking and heard a \"pop\" in the front of the knee and was not able to walk or stand. Pain shot up to her back and down the right leg.    Pain is ok at rest. Aggravated with low chairs/toilets getting up or bending the knee, stairs, prolonged walking. Will have swelling as the day goes on. Seems to be across the front of the knee and towards the inner aspect. Recently worse with stairs.    Has been followed by Dr Mcmahon in Sports Medicine, last seen 2/22/2021. Seemed to be doing well at that time and no mention of reinjury, as well as seen in Physical Therapy and no mention of reinjury. Seems like the injury " was actually back in September and not recently.    Treatment with Physical Therapy, bracing (), walking. Takes ibuprofen and tylenol sometimes.      Denies hip or low back pain.    Present symptoms: pain medially , anteriorly and diffuse, pain sharp, dull/achy , mild pain.    Pain severity: 3/10  Frequency of symptoms: frequently  Exacerbating Factors: weight bearing, stairs, [id number], prolonged standing  Relieving Factors: rest, sitting, positional changes  Night Pain: occasional  Pain while at rest: No   Numbness or tingling: No   Patient has tried:     NSAIDS: occasional      Physical Therapy: Yes      Activity modification: Yes      Bracing: Yes ,  which helps.     Injections: No      Ice: No      Assistive device:  cane, left hand.     Other: tylenol      Other PMH:  has a past medical history of GERD (gastroesophageal reflux disease), Mixed hyperlipidemia (8/20/2018), Pelvic mass in female (10/1/2019), and Prediabetes (8/20/2018).  Patient Active Problem List   Diagnosis     CARDIOVASCULAR SCREENING; LDL GOAL LESS THAN 160     Prediabetes     Vitamin D deficiency     Mixed hyperlipidemia     Overweight     Pelvic mass in female     Pelvic mass     S/P bilateral salpingo-oophorectomy     Right knee pain       Surgical Hx:  has a past surgical history that includes hemorrhoidectomy (1981) and Laparoscopic salpingo-oophorectomy (Bilateral, 10/7/2019).    Medications:   Current Outpatient Medications:      acetaminophen (TYLENOL) 325 MG tablet, Take 2 tablets (650 mg) by mouth every 6 hours as needed for mild pain (Patient not taking: Reported on 9/12/2020), Disp: 24 tablet, Rfl: 0     aspirin (ASA) 325 MG EC tablet, Take 325 mg by mouth every 6 hours as needed for moderate pain, Disp: , Rfl:      famotidine (PEPCID) 20 MG tablet, Take 1 tablet (20 mg) by mouth 2 times daily (Patient not taking: Reported on 9/12/2020), Disp: 90 tablet, Rfl: 3     ibuprofen (ADVIL/MOTRIN) 600 MG tablet, Take 1  tablet (600 mg) by mouth every 6 hours as needed for moderate pain, Disp: 12 tablet, Rfl: 0     SENNA-docusate sodium (SENNA S) 8.6-50 MG tablet, Take 1 tablet by mouth 2 times daily as needed (constipation) (Patient not taking: Reported on 9/12/2020), Disp: 40 tablet, Rfl: 0     vitamin D3 (CHOLECALCIFEROL) 2000 units (50 mcg) tablet, Take 1 tablet by mouth daily, Disp: , Rfl:     Allergies:   Allergies   Allergen Reactions     Penicillin [Penicillins]        Social Hx: housekeeping at [employer].   reports that she has never smoked. She has never used smokeless tobacco. She reports that she does not drink alcohol or use drugs.    Family Hx: family history includes Cerebrovascular Disease in her mother.    REVIEW OF SYSTEMS: 10 point ROS neg other than the symptoms noted above in the HPI and PMH. Notables include  CONSTITUTIONAL:NEGATIVE for fever, chills, change in weight  INTEGUMENTARY/SKIN: NEGATIVE for worrisome rashes, moles or lesions  MUSCULOSKELETAL:See HPI above  NEURO: NEGATIVE for weakness, dizziness or paresthesias    PHYSICAL EXAM:  /76   Pulse 90   Resp 16   Wt 68.9 kg (152 lb)   LMP  (LMP Unknown)   SpO2 97%   BMI 28.72 kg/m     GENERAL APPEARANCE: healthy, alert, no distress; accompanied by her daughter.  SKIN: no suspicious lesions or rashes  NEURO: Normal strength and tone, mentation intact and speech normal  PSYCH:  mentation appears normal and affect normal, not anxious  RESPIRATORY: No increased work of breathing.  HANDS: no clubbing, nail pitting  LYMPH: no palpable popliteal lymphadenopathy.    BILATERAL LOWER EXTREMITIES:  Gait: hunched, favors the right.  Alignment: varus  No gross deformities or masses.  Bilateral Quad atrophy, strength weak  Intact sensation deep peroneal nerve, superficial peroneal nerve, med/lat tibial nerve, sural nerve, saphenous nerve  Intact EHL, EDL, TA, FHL, GS, quadriceps hamstrings and hip flexors  Toes warm and well perfused, brisk capillary  refill. Palpable 2+ dp pulses.  Bilateral calf soft and nttp or squeeze.  DTRs: achilles 2+, patella 2+.  Edema: trace    LEFT KNEE EXAM:    Skin: intact, no ecchymosis or erythema  ROM: slight hyperextension to 135 flexion  Tight hamstrings on straight leg raise.  Effusion: none  Tender: NTTP med/lat joint line, anterior or posterior knee  McMurrays: negative    MCL: stable, and non-painful at both 0 and 30 degrees knee flexion  Varus stress: stable, and non-painful at both 0 and 30 degrees knee flexion  Lachmans: neg, firm endpoint  Posterior Drawer stable  Patellofemoral joint:                Apprehension: negative              Crepitations: minimal    RIGHT KNEE EXAM:    Skin: intact, no ecchymosis or erythema  ROM: full extension to 135 flexion  Tight hamstrings on straight leg raise.  Effusion: none  Tender: medial jointline, medial proximal tibia, pes bursa  nontender to palpation lat joint line, anterior or posterior knee  McMurrays: negative    MCL: stable, and non-painful at both 0 and 30 degrees knee flexion  Varus stress: stable, and non-painful at both 0 and 30 degrees knee flexion  Lachmans: neg, firm endpoint  Posterior Drawer stable  Patellofemoral joint:                Apprehension: negative              Crepitations: mild   Grind: positive.    X-RAY:  2 views right knee from 12/21/2020 were reviewed in clinic today. On my review, no obvious fractures or dislocations. Mild medial compartment narrowing. Previously seen sclerosis along the medial tibial plateau is less well-defined. No discrete fracture lucency or displacement. No joint effusion.    MRI:  MRI right knee from 10/23/2020 was reviewed in clinic today.    1. Avulsion of the medial meniscus posterior root ligament with medial tibial plateau insufficiency fracture.  2. Focal grade IV chondromalacia in the patellofemoral compartment and grade III chondromalacia in medial compartment.  3. Pes anserine bursitis, may be reactive to meniscal  "pathology.       ASSESSMENT/PLAN: Gerardo Valenzuela is a 60 year old female with chronic right knee pain, fairly advanced medial and patello-femoral primary osteoarthritis, complex medial meniscus tear, pes bursitis/tendinitis.     * reviewed imaging studies with patient and daughter, showing arthritic changes, or wearing of the cartilage in the knee. This can be caused by normal \"wear and tear\" over the years or following prior injury to the knee.  * there is also posterior root medial meniscus tear as well.  * suspect most pain is from underlying osteoarthritis in the front of the knee as well as medially, where the subchondral insufficiency fracture was located.  * I don't think that arthroscopic surgery would provide her much or prolonged pain relief given the amount of underlying osteoarthritis.    Treatment typically starts nonsurgically. Surgical indication for total knee arthroplasty  when nonsurgical management is no longer effective.    Non-surgical treatment for knee arthritis includes:    * rest, sitting  * Activity modification - avoid impact activities or activities that aggravate symptoms.  * NSAIDS (non-steroidal anti-inflammatory medications; e.g. Aleve, advil, motrin, ibuprofen) - regular use for inflammation ( twice daily or three times daily), with food, as long as no contra-indications Please discuss with primary care doctor if needed  * ice, 15-20 minutes at a time several times a day or as needed.  * Strengthening of quadriceps muscles  * Physical Therapy for strengthening, stretching and range of motion exercises of legs  * Tylenol as needed for pain, consider Tylenol arthritis or similar  * Weight loss: Weight loss:  Body mass index is 28.72 kg/m .. weight loss benefits, not only for the current pain symptoms, but also overall health. Recommend a good diet plan that works for the patient, with the assistance of a dietician or primary care doctor as needed. Also, a good, low-impact exercise " "program for at least 20 minutes per day, 3 times per week, such as exercise bike, elliptical , or pool.  * Exercise: low impact such as stationary bike, elliptical, pool.  * Injections: cortisone versus viscosupplementation (hyaluronic acid, \"rooster comb\", \"gel shots\"); risks and perceived benefits discussed today. Patient elects to proceed.  * Bracing: bracing the knee may offer some relief of symptoms when worn and provide some stability.  * over the counter supplements such as glucosamine and chondroitin sulfate may help with joint pain.  * topical ointments may help as well    * return to clinic as needed.            Jimmy Moreno M.D., M.S.  Dept. of Orthopaedic Surgery  [facility]    Large Joint Injection/Arthocentesis: R knee joint    Date/Time: 2/26/2021 10:19 AM  Performed by: Damien Hurtado PA  Authorized by: Jimmy Moreno MD     Indications:  Pain and osteoarthritis  Needle Size:  22 G  Guidance: landmark guided    Approach:  Anteromedial  Location:  Knee      Medications:  80 mg methylPREDNISolone 80 MG/ML; 4 mL bupivacaine 0.25 %  Outcome:  Tolerated well, no immediate complications  Procedure discussed: discussed risks, benefits, and alternatives    Consent Given by:  Patient  Prep: patient was prepped and draped in usual sterile fashion          "

## 2021-02-26 NOTE — LETTER
"    2/26/2021         RE: Gerardo Valenzuela  [address] [place] [id number]        Dear Colleague,    Thank you for referring your patient, Gerardo Valenzuela, to the [facility]. Please see a copy of my visit note below.    CHIEF COMPLAINT:   Chief Complaint   Patient presents with     Knee right     Right knee injury DOI: 9/12/2020. About 3 weeks ago she was walking heard a pop in front of knee then was not able to walk/bear weight, states that pain went up to back and down right leg. She took ibuprofen 800 mg. She is walking for 30 minutes with cane and taking a break half way between. She wears  brace anytime she is on her feet and she feels this has been helpful. Stairs are still difficult/painful for her.          Work Comp   .  Gerardo Valenzuela is seen today in the [facility] for evaluation of right knee pain, injury at the request of [facility]            HISTORY OF PRESENT ILLNESS    Gerardo Valenzuela is a 60 year old female seen for evaluation of ongoing right knee pain with a known injury. Reports pain from injury 9/12/2020.  was walking and hit leg/knee on something and had pain shoot down the leg. A few weeks before then was moving a refrigerator and felt pain in the back/hip and down to the knee. Treated with ice, hot pack. Continued to work. Had pain with walking. Noted to have possible insufficiency subchondral fracture, complex medial meniscus tear. Treated conservatively for subchondral insufficiency fracture. Seems to have healed.. About 3 weeks ago was walking and heard a \"pop\" in the front of the knee and was not able to walk or stand. Pain shot up to her back and down the right leg.    Pain is ok at rest. Aggravated with low chairs/toilets getting up or bending the knee, stairs, prolonged walking. Will have swelling as the day goes on. Seems to be across the front of the knee and towards the inner aspect. " Recently worse with stairs.    Has been followed by Dr Mcmahon in Sports Medicine, last seen 2/22/2021. Seemed to be doing well at that time and no mention of reinjury, as well as seen in Physical Therapy and no mention of reinjury. Seems like the injury was actually back in September and not recently.    Treatment with Physical Therapy, bracing (), walking. Takes ibuprofen and tylenol sometimes.      Denies hip or low back pain.    Present symptoms: pain medially , anteriorly and diffuse, pain sharp, dull/achy , mild pain.    Pain severity: 3/10  Frequency of symptoms: frequently  Exacerbating Factors: weight bearing, stairs, [id number], prolonged standing  Relieving Factors: rest, sitting, positional changes  Night Pain: occasional  Pain while at rest: No   Numbness or tingling: No   Patient has tried:     NSAIDS: occasional      Physical Therapy: Yes      Activity modification: Yes      Bracing: Yes ,  which helps.     Injections: No      Ice: No      Assistive device:  cane, left hand.     Other: tylenol      Other PMH:  has a past medical history of GERD (gastroesophageal reflux disease), Mixed hyperlipidemia (8/20/2018), Pelvic mass in female (10/1/2019), and Prediabetes (8/20/2018).  Patient Active Problem List   Diagnosis     CARDIOVASCULAR SCREENING; LDL GOAL LESS THAN 160     Prediabetes     Vitamin D deficiency     Mixed hyperlipidemia     Overweight     Pelvic mass in female     Pelvic mass     S/P bilateral salpingo-oophorectomy     Right knee pain       Surgical Hx:  has a past surgical history that includes hemorrhoidectomy (1981) and Laparoscopic salpingo-oophorectomy (Bilateral, 10/7/2019).    Medications:   Current Outpatient Medications:      acetaminophen (TYLENOL) 325 MG tablet, Take 2 tablets (650 mg) by mouth every 6 hours as needed for mild pain (Patient not taking: Reported on 9/12/2020), Disp: 24 tablet, Rfl: 0     aspirin (ASA) 325 MG EC tablet, Take 325 mg by mouth  every 6 hours as needed for moderate pain, Disp: , Rfl:      famotidine (PEPCID) 20 MG tablet, Take 1 tablet (20 mg) by mouth 2 times daily (Patient not taking: Reported on 9/12/2020), Disp: 90 tablet, Rfl: 3     ibuprofen (ADVIL/MOTRIN) 600 MG tablet, Take 1 tablet (600 mg) by mouth every 6 hours as needed for moderate pain, Disp: 12 tablet, Rfl: 0     SENNA-docusate sodium (SENNA S) 8.6-50 MG tablet, Take 1 tablet by mouth 2 times daily as needed (constipation) (Patient not taking: Reported on 9/12/2020), Disp: 40 tablet, Rfl: 0     vitamin D3 (CHOLECALCIFEROL) 2000 units (50 mcg) tablet, Take 1 tablet by mouth daily, Disp: , Rfl:     Allergies:   Allergies   Allergen Reactions     Penicillin [Penicillins]        Social Hx: housekeeping at [employer].   reports that she has never smoked. She has never used smokeless tobacco. She reports that she does not drink alcohol or use drugs.    Family Hx: family history includes Cerebrovascular Disease in her mother.    REVIEW OF SYSTEMS: 10 point ROS neg other than the symptoms noted above in the HPI and PMH. Notables include  CONSTITUTIONAL:NEGATIVE for fever, chills, change in weight  INTEGUMENTARY/SKIN: NEGATIVE for worrisome rashes, moles or lesions  MUSCULOSKELETAL:See HPI above  NEURO: NEGATIVE for weakness, dizziness or paresthesias    PHYSICAL EXAM:  /76   Pulse 90   Resp 16   Wt 68.9 kg (152 lb)   LMP  (LMP Unknown)   SpO2 97%   BMI 28.72 kg/m     GENERAL APPEARANCE: healthy, alert, no distress; accompanied by her daughter.  SKIN: no suspicious lesions or rashes  NEURO: Normal strength and tone, mentation intact and speech normal  PSYCH:  mentation appears normal and affect normal, not anxious  RESPIRATORY: No increased work of breathing.  HANDS: no clubbing, nail pitting  LYMPH: no palpable popliteal lymphadenopathy.    BILATERAL LOWER EXTREMITIES:  Gait: hunched, favors the right.  Alignment: varus  No gross deformities or masses.  Bilateral Quad  atrophy, strength weak  Intact sensation deep peroneal nerve, superficial peroneal nerve, med/lat tibial nerve, sural nerve, saphenous nerve  Intact EHL, EDL, TA, FHL, GS, quadriceps hamstrings and hip flexors  Toes warm and well perfused, brisk capillary refill. Palpable 2+ dp pulses.  Bilateral calf soft and nttp or squeeze.  DTRs: achilles 2+, patella 2+.  Edema: trace    LEFT KNEE EXAM:    Skin: intact, no ecchymosis or erythema  ROM: slight hyperextension to 135 flexion  Tight hamstrings on straight leg raise.  Effusion: none  Tender: NTTP med/lat joint line, anterior or posterior knee  McMurrays: negative    MCL: stable, and non-painful at both 0 and 30 degrees knee flexion  Varus stress: stable, and non-painful at both 0 and 30 degrees knee flexion  Lachmans: neg, firm endpoint  Posterior Drawer stable  Patellofemoral joint:                Apprehension: negative              Crepitations: minimal    RIGHT KNEE EXAM:    Skin: intact, no ecchymosis or erythema  ROM: full extension to 135 flexion  Tight hamstrings on straight leg raise.  Effusion: none  Tender: medial jointline, medial proximal tibia, pes bursa  nontender to palpation lat joint line, anterior or posterior knee  McMurrays: negative    MCL: stable, and non-painful at both 0 and 30 degrees knee flexion  Varus stress: stable, and non-painful at both 0 and 30 degrees knee flexion  Lachmans: neg, firm endpoint  Posterior Drawer stable  Patellofemoral joint:                Apprehension: negative              Crepitations: mild   Grind: positive.    X-RAY:  2 views right knee from 12/21/2020 were reviewed in clinic today. On my review, no obvious fractures or dislocations. Mild medial compartment narrowing. Previously seen sclerosis along the medial tibial plateau is less well-defined. No discrete fracture lucency or displacement. No joint effusion.    MRI:  MRI right knee from 10/23/2020 was reviewed in clinic today.    1. Avulsion of the medial  "meniscus posterior root ligament with medial tibial plateau insufficiency fracture.  2. Focal grade IV chondromalacia in the patellofemoral compartment and grade III chondromalacia in medial compartment.  3. Pes anserine bursitis, may be reactive to meniscal pathology.       ASSESSMENT/PLAN: Gerardo Valenzuela is a 60 year old female with chronic right knee pain, fairly advanced medial and patello-femoral primary osteoarthritis, complex medial meniscus tear, pes bursitis/tendinitis.     * reviewed imaging studies with patient and daughter, showing arthritic changes, or wearing of the cartilage in the knee. This can be caused by normal \"wear and tear\" over the years or following prior injury to the knee.  * there is also posterior root medial meniscus tear as well.  * suspect most pain is from underlying osteoarthritis in the front of the knee as well as medially, where the subchondral insufficiency fracture was located.  * I don't think that arthroscopic surgery would provide her much or prolonged pain relief given the amount of underlying osteoarthritis.    Treatment typically starts nonsurgically. Surgical indication for total knee arthroplasty  when nonsurgical management is no longer effective.    Non-surgical treatment for knee arthritis includes:    * rest, sitting  * Activity modification - avoid impact activities or activities that aggravate symptoms.  * NSAIDS (non-steroidal anti-inflammatory medications; e.g. Aleve, advil, motrin, ibuprofen) - regular use for inflammation ( twice daily or three times daily), with food, as long as no contra-indications Please discuss with primary care doctor if needed  * ice, 15-20 minutes at a time several times a day or as needed.  * Strengthening of quadriceps muscles  * Physical Therapy for strengthening, stretching and range of motion exercises of legs  * Tylenol as needed for pain, consider Tylenol arthritis or similar  * Weight loss: Weight loss:  Body mass index is " "28.72 kg/m .. weight loss benefits, not only for the current pain symptoms, but also overall health. Recommend a good diet plan that works for the patient, with the assistance of a dietician or primary care doctor as needed. Also, a good, low-impact exercise program for at least 20 minutes per day, 3 times per week, such as exercise bike, elliptical , or pool.  * Exercise: low impact such as stationary bike, elliptical, pool.  * Injections: cortisone versus viscosupplementation (hyaluronic acid, \"rooster comb\", \"gel shots\"); risks and perceived benefits discussed today. Patient elects to proceed.  * Bracing: bracing the knee may offer some relief of symptoms when worn and provide some stability.  * over the counter supplements such as glucosamine and chondroitin sulfate may help with joint pain.  * topical ointments may help as well    * return to clinic as needed.            Jimmy Moreno M.D., M.S.  Dept. of Orthopaedic Surgery  [facility]    Large Joint Injection/Arthocentesis: R knee joint    Date/Time: 2/26/2021 10:19 AM  Performed by: Damien Hurtado PA  Authorized by: Jimmy Moreno MD     Indications:  Pain and osteoarthritis  Needle Size:  22 G  Guidance: landmark guided    Approach:  Anteromedial  Location:  Knee      Medications:  80 mg methylPREDNISolone 80 MG/ML; 4 mL bupivacaine 0.25 %  Outcome:  Tolerated well, no immediate complications  Procedure discussed: discussed risks, benefits, and alternatives    Consent Given by:  Patient  Prep: patient was prepped and draped in usual sterile fashion              Again, thank you for allowing me to participate in the care of your patient.        Sincerely,        Jimmy Moreno MD    "

## 2021-02-28 RX ORDER — BUPIVACAINE HYDROCHLORIDE 2.5 MG/ML
4 INJECTION, SOLUTION INFILTRATION; PERINEURAL
Status: DISCONTINUED | OUTPATIENT
Start: 2021-02-26 | End: 2021-09-03

## 2021-02-28 RX ORDER — METHYLPREDNISOLONE ACETATE 80 MG/ML
80 INJECTION, SUSPENSION INTRA-ARTICULAR; INTRALESIONAL; INTRAMUSCULAR; SOFT TISSUE
Status: DISCONTINUED | OUTPATIENT
Start: 2021-02-26 | End: 2021-09-03

## 2021-03-01 ENCOUNTER — VIRTUAL VISIT (OUTPATIENT)
Dept: ORTHOPEDICS | Facility: CLINIC | Age: 61
End: 2021-03-01
Payer: OTHER MISCELLANEOUS

## 2021-03-01 DIAGNOSIS — M25.561 RIGHT KNEE PAIN, UNSPECIFIED CHRONICITY: ICD-10-CM

## 2021-03-01 DIAGNOSIS — S83.8X1D ACUTE MEDIAL MENISCAL INJURY OF RIGHT KNEE, SUBSEQUENT ENCOUNTER: Primary | ICD-10-CM

## 2021-03-01 PROCEDURE — 99212 OFFICE O/P EST SF 10 MIN: CPT | Mod: TEL | Performed by: PEDIATRICS

## 2021-03-01 NOTE — PATIENT INSTRUCTIONS
Updated work letter, starting 3/8/21.  Continue therapy exercises, and bracing for comfort.  Hopefully injection will take more effect in the next 1 week.    If you have any further questions for your physician or physician s care team you can call [phone] and use option 3 to leave a voice message. Calls received during business hours will be returned same day.

## 2021-03-01 NOTE — PROGRESS NOTES
Gerardo is a 60 year old who is being evaluated via a billable telephone visit.      What phone number would you like to be contacted at? [phone]  How would you like to obtain your AVS? Shonda  Phone call duration: 8 minutes      Sports Medicine Clinic Visit    PCP: Bibiana Najera    Gerardo Valenzuela is a 60 year old female who is seen in f/u up for    Acute medial meniscal injury of right knee, subsequent encounter  Right knee pain, unspecified chronicity. Since last visit on 2/22/2021 patient has seen Dr. Moreno and underwent a steroid injection.   Since that time, she has had slight improvement when walking.  She has continued with the use of the cane  She would like to discuss her work restrictions.    **  With injection last week, noted increased pain 5-6 hours later. Took acetaminophen, and pain improved.  Over this past weekend (past 1-2 days), noted had improved somewhat. No worse after injection, but not sure how much improvement so far.  Most recent letter I had provided is too restrictive, per pt and daughter. There was a letter from Oct 2020 that did allow her to return to work, so they are wondering if that would be ok to try.    **  Reviewed note from ortho surgery. Focus of discussion was on underlying degenerative change, OA.      Review of Systems  All other systems reviewed and are negative unless noted above.    Past Medical History:   Diagnosis Date     GERD (gastroesophageal reflux disease)      Mixed hyperlipidemia 8/20/2018     Pelvic mass in female 10/1/2019    Added automatically from request for surgery [id number]     Prediabetes 8/20/2018     Past Surgical History:   Procedure Laterality Date     HEMORRHOIDECTOMY  1981    in [place]     LAPAROSCOPIC SALPINGO-OOPHORECTOMY Bilateral 10/7/2019    Procedure: Diagnostic laparoscopy, exploratory laparotomy, Bilateral SALPINGO-OOPHORECTOMY, lysis of adhesions;  Surgeon: Prashant Sawyer MD;  Location: [facility] OR     Family History    Problem Relation Age of Onset     Cerebrovascular Disease Mother         CVA     Social History     Socioeconomic History     Marital status:      Spouse name: Not on file     Number of children: Not on file     Years of education: Not on file     Highest education level: Not on file   Occupational History     Occupation: Housekeeping     Employer: [employer]   Social Needs     Financial resource strain: Not on file     Food insecurity     Worry: Not on file     Inability: Not on file     Transportation needs     Medical: Not on file     Non-medical: Not on file   Tobacco Use     Smoking status: Never Smoker     Smokeless tobacco: Never Used   Substance and Sexual Activity     Alcohol use: No     Drug use: No     Sexual activity: Yes     Partners: Male   Lifestyle     Physical activity     Days per week: Not on file     Minutes per session: Not on file     Stress: Not on file   Relationships     Social connections     Talks on phone: Not on file     Gets together: Not on file     Attends Sikhism service: Not on file     Active member of club or organization: Not on file     Attends meetings of clubs or organizations: Not on file     Relationship status: Not on file     Intimate partner violence     Fear of current or ex partner: Not on file     Emotionally abused: Not on file     Physically abused: Not on file     Forced sexual activity: Not on file   Other Topics Concern     Parent/sibling w/ CABG, MI or angioplasty before 65F 55M? No   Social History Narrative     Not on file         Objective  LMP  (LMP Unknown)     GENERAL APPEARANCE: alert and no distress   GAIT: cane by report  SKIN:   NEURO:  mentation intact and speech normal  PSYCH:  mentation appears normal and affect normal/bright    HEENT:   CV:  RESP:       Exam        Radiology  None new. See previous notes.    Assessment:  1. Acute medial meniscal injury of right knee, subsequent encounter    2. Right knee pain, unspecified  chronicity        Plan:  Discussed the assessment with the patient and her daughter, on speaker.  Can still get more improvement from injection, limited so far. Sounds like had brief steroid flare, improved with acetaminophen.  Will update letter, try to return to more work. I think she is safe to do so, anticipating benefit from injection. Will increase work starting Mon, 3/8/21.  Otherwise continue with PT and bracing as discussed previously.  Follow up: 4 weeks or as needed.  Questions answered. Discussed signs and symptoms that may indicate more serious issues; the patient was instructed to seek appropriate care if noted. Letebrhan indicates understanding of these issues and agrees with the plan.      Ronen Mcmahon, DO, CAQ          This note consists of symbols derived from keyboarding, dictation and/or voice recognition software. As a result, there may be errors in the script that have gone undetected. Please consider this when interpreting information found in this chart.

## 2021-03-01 NOTE — LETTER
3/1/2021         RE: Gerardo Valenzuela  [address] [place] [id number]        Dear Colleague,    Thank you for referring your patient, Gerardo Valenzuela, to the [facility]. Please see a copy of my visit note below.    Gerardo is a 60 year old who is being evaluated via a billable telephone visit.      What phone number would you like to be contacted at? [phone]  How would you like to obtain your AVS? Katarzynahart  Phone call duration: 8 minutes      Sports Medicine Clinic Visit    PCP: Bibiana Najera    Gerardo Valenzuela is a 60 year old female who is seen in f/u up for    Acute medial meniscal injury of right knee, subsequent encounter  Right knee pain, unspecified chronicity. Since last visit on 2/22/2021 patient has seen Dr. Moreno and underwent a steroid injection.   Since that time, she has had slight improvement when walking.  She has continued with the use of the cane  She would like to discuss her work restrictions.    **  With injection last week, noted increased pain 5-6 hours later. Took acetaminophen, and pain improved.  Over this past weekend (past 1-2 days), noted had improved somewhat. No worse after injection, but not sure how much improvement so far.  Most recent letter I had provided is too restrictive, per pt and daughter. There was a letter from Oct 2020 that did allow her to return to work, so they are wondering if that would be ok to try.    **  Reviewed note from ortho surgery. Focus of discussion was on underlying degenerative change, OA.      Review of Systems  All other systems reviewed and are negative unless noted above.    Past Medical History:   Diagnosis Date     GERD (gastroesophageal reflux disease)      Mixed hyperlipidemia 8/20/2018     Pelvic mass in female 10/1/2019    Added automatically from request for surgery [id number]     Prediabetes 8/20/2018     Past Surgical History:   Procedure Laterality Date     HEMORRHOIDECTOMY   1981    in [place]     LAPAROSCOPIC SALPINGO-OOPHORECTOMY Bilateral 10/7/2019    Procedure: Diagnostic laparoscopy, exploratory laparotomy, Bilateral SALPINGO-OOPHORECTOMY, lysis of adhesions;  Surgeon: Prashant Sawyer MD;  Location:  OR     Family History   Problem Relation Age of Onset     Cerebrovascular Disease Mother         CVA     Social History     Socioeconomic History     Marital status:      Spouse name: Not on file     Number of children: Not on file     Years of education: Not on file     Highest education level: Not on file   Occupational History     Occupation: Housekeeping     Employer: [employer]   Social [place]     Financial resource strain: Not on file     Food insecurity     Worry: Not on file     Inability: Not on file     Transportation needs     Medical: Not on file     Non-medical: Not on file   Tobacco Use     Smoking status: Never Smoker     Smokeless tobacco: Never Used   Substance and Sexual Activity     Alcohol use: No     Drug use: No     Sexual activity: Yes     Partners: Male   Lifestyle     Physical activity     Days per week: Not on file     Minutes per session: Not on file     Stress: Not on file   Relationships     Social connections     Talks on phone: Not on file     Gets together: Not on file     Attends Sabianist service: Not on file     Active member of club or organization: Not on file     Attends meetings of clubs or organizations: Not on file     Relationship status: Not on file     Intimate partner violence     Fear of current or ex partner: Not on file     Emotionally abused: Not on file     Physically abused: Not on file     Forced sexual activity: Not on file   Other Topics Concern     Parent/sibling w/ CABG, MI or angioplasty before 65F 55M? No   Social History Narrative     Not on file         Objective  LMP  (LMP Unknown)     GENERAL APPEARANCE: alert and no distress   GAIT: cane by report  SKIN:   NEURO:  mentation intact and speech  normal  PSYCH:  mentation appears normal and affect normal/bright    HEENT:   CV:  RESP:       Exam        Radiology  None new. See previous notes.    Assessment:  1. Acute medial meniscal injury of right knee, subsequent encounter    2. Right knee pain, unspecified chronicity        Plan:  Discussed the assessment with the patient and her daughter, on speaker.  Can still get more improvement from injection, limited so far. Sounds like had brief steroid flare, improved with acetaminophen.  Will update letter, try to return to more work. I think she is safe to do so, anticipating benefit from injection. Will increase work starting Mon, 3/8/21.  Otherwise continue with PT and bracing as discussed previously.  Follow up: 4 weeks or as needed.  Questions answered. Discussed signs and symptoms that may indicate more serious issues; the patient was instructed to seek appropriate care if noted. Letebrhan indicates understanding of these issues and agrees with the plan.      Ronen Mcmahon DO, CAQ          This note consists of symbols derived from keyboarding, dictation and/or voice recognition software. As a result, there may be errors in the script that have gone undetected. Please consider this when interpreting information found in this chart.          Again, thank you for allowing me to participate in the care of your patient.        Sincerely,        Ronen Mcmahon DO

## 2021-03-01 NOTE — LETTER
REPORT OF WORK ABILITY    NOTE TO EMPLOYEE: You must promptly provide a copy of this report to your  employer or worker's compensation insurer, and Qualified Rehabilitation Consultant.    Date: March 1, 2021                    Employee Name: Gerardo Valenzuela         YOB: 1960  Medical Record Number: [id number]   Soc.Sec.No: [id number]  Employer: None                Date of Injury: 9/12/20  Managed Care Organization / Insurance Company Name: UNKNOWN    Diagnosis: right knee injury, increased symptoms with return to work  Work Related: yes     MMI: NO  Permanent Partial Disability (PPD) likely: UNKNOWN    EMPLOYEE IS ABLE TO WORK: Return to work with restrictions on Monday, 3/8/21, with restrictions outlined below. Restrictions in place 4 weeks.     RESTRICTIONS IF ANY:    Stand:  Frequently (4-6 hours)  Sit:  Full time is ok  Walk: frequently (4-6 hours)  Kneel/Auxier:  Not at all (0 hours)  Lift, carry:  Up to 20 lbs  Push/Pull:  Up to 20 lbs      OTHER RESTRICTIONS: None    TREATMENT PLAN/NOTES: change work position for comfort, best work height mid chest to mid thigh, may need to restrict work activities for comfort, may continue with knee brace and cold pack for comfort, Elevate and Rest if needed. Continue therapy exercises also.            Ronen NIX.

## 2021-03-01 NOTE — LETTER
REPORT OF WORK ABILITY    NOTE TO EMPLOYEE: You must promptly provide a copy of this report to your  employer or worker's compensation insurer, and Qualified Rehabilitation Consultant.    Date: March 1, 2021                    Employee Name: Gerardo Valenzuela         YOB: 1960  Medical Record Number: [id number]   Soc.Sec.No: [id number]  Employer: None                Date of Injury: 9/12/20  Managed Care Organization / Insurance Company Name: UNKNOWN    Diagnosis: right knee injury, increased symptoms with return to work  Work Related: yes     MMI: NO  Permanent Partial Disability (PPD) likely: UNKNOWN    EMPLOYEE IS ABLE TO WORK: Return to work with restrictions on Monday, 3/8/21, with restrictions outlined below. Restrictions in place 4 weeks.     RESTRICTIONS IF ANY:    Stand:  Frequently (4-6 hours)  Sit:  Full time is ok  Walk: frequently (4-6 hours)  Kneel/[place]:  Not at all (0 hours)  Lift, carry:  Up to 20 lbs  Push/Pull:  Up to 20 lbs      OTHER RESTRICTIONS: None    TREATMENT PLAN/NOTES: change work position for comfort, best work height mid chest to mid thigh, may need to restrict work activities for comfort, may continue with knee brace and cold pack for comfort, Elevate and Rest if needed. Continue therapy exercises also.            Ronen NIX.

## 2021-03-02 ENCOUNTER — THERAPY VISIT (OUTPATIENT)
Dept: PHYSICAL THERAPY | Facility: CLINIC | Age: 61
End: 2021-03-02
Payer: COMMERCIAL

## 2021-03-02 DIAGNOSIS — G89.29 CHRONIC PAIN OF RIGHT KNEE: ICD-10-CM

## 2021-03-02 DIAGNOSIS — M25.561 CHRONIC PAIN OF RIGHT KNEE: ICD-10-CM

## 2021-03-02 PROCEDURE — 97530 THERAPEUTIC ACTIVITIES: CPT | Mod: GP | Performed by: PHYSICAL THERAPIST

## 2021-03-02 PROCEDURE — 97110 THERAPEUTIC EXERCISES: CPT | Mod: GP | Performed by: PHYSICAL THERAPIST

## 2021-03-09 ENCOUNTER — THERAPY VISIT (OUTPATIENT)
Dept: PHYSICAL THERAPY | Facility: CLINIC | Age: 61
End: 2021-03-09
Payer: COMMERCIAL

## 2021-03-09 DIAGNOSIS — M25.561 CHRONIC PAIN OF RIGHT KNEE: ICD-10-CM

## 2021-03-09 DIAGNOSIS — G89.29 CHRONIC PAIN OF RIGHT KNEE: ICD-10-CM

## 2021-03-09 PROCEDURE — 97112 NEUROMUSCULAR REEDUCATION: CPT | Mod: GP | Performed by: PHYSICAL THERAPIST

## 2021-03-09 PROCEDURE — 97110 THERAPEUTIC EXERCISES: CPT | Mod: GP | Performed by: PHYSICAL THERAPIST

## 2021-04-05 ENCOUNTER — MYC MEDICAL ADVICE (OUTPATIENT)
Dept: ORTHOPEDICS | Facility: CLINIC | Age: 61
End: 2021-04-05

## 2021-04-07 ENCOUNTER — VIRTUAL VISIT (OUTPATIENT)
Dept: ORTHOPEDICS | Facility: CLINIC | Age: 61
End: 2021-04-07
Payer: OTHER MISCELLANEOUS

## 2021-04-07 DIAGNOSIS — M25.561 RIGHT KNEE PAIN, UNSPECIFIED CHRONICITY: ICD-10-CM

## 2021-04-07 DIAGNOSIS — S83.8X1D ACUTE MEDIAL MENISCAL INJURY OF RIGHT KNEE, SUBSEQUENT ENCOUNTER: Primary | ICD-10-CM

## 2021-04-07 PROCEDURE — 99213 OFFICE O/P EST LOW 20 MIN: CPT | Mod: 95 | Performed by: PEDIATRICS

## 2021-04-07 NOTE — LETTER
4/7/2021         RE: Gerardo Valenzuela  [address] [place] [address]  [place] [address]        Dear Colleague,    Thank you for referring your patient, Gerardo Valenzuela, to the [facility]. Please see a copy of my visit note below.    Gerardo is a 60 year old who is being evaluated via a billable video visit.      How would you like to obtain your AVS? MyChart  If the video visit is dropped, the invitation should be resent by: Text to cell phone: [phone]  Will anyone else be joining your video visit? No    Video Start Time: 10:01 AM  Video-Visit Details    Type of service:  Video Visit    Video End Time:10:14 AM    Originating Location (pt. Location): Home    Distant Location (provider location):  [facility]     Platform used for Video Visit: [facility] Visit      Assessment:  1. Acute medial meniscal injury of right knee, subsequent encounter    2. Right knee pain, unspecified chronicity        Plan:  Discussed the assessment with the patient.  Going ok with light duty. Continue with current restrictions. New letter provided.  Continue with physical therapy.  Bracing for comfort.  We may consider a repeat injection in the future, if persistent issues.  Follow up: 6 weeks, sooner prn.      See Patient Instructions  Patient Instructions   Continue with therapy  Bracing for comfort  Updated work letter  Follow up 6 weeks    If you have any further questions for your physician or physician s care team you can call [phone] and use option 3 to leave a voice message. Calls received during business hours will be returned same day.              Ronen Mcmahon DO  [facility]      ==========================================      PCP: Bibiana Najera    Gerardo Valenzuela is a 60 year old female who is seen in f/u up for    Acute medial meniscal injury of right knee, subsequent  "encounter  Right knee pain, unspecified chronicity. Since last visit on 3/1/2021 virtual visit,  patient has had some relief from the injection.  Feels that it did help, but \"not 100%\".   She has continued to use her cane for ambulation and brace.   She has continued to work light duty.  Feels she is able to maintain this work.   **  No longer with swelling.  Trying to walk without brace, and that is challenging.  Working--light duty, some cleaning.  Has more PT planned.    Review of Systems  All other systems reviewed and are negative unless noted above.    Past Medical History:   Diagnosis Date     GERD (gastroesophageal reflux disease)      Mixed hyperlipidemia 8/20/2018     Pelvic mass in female 10/1/2019    Added automatically from request for surgery [id number]     Prediabetes 8/20/2018     Past Surgical History:   Procedure Laterality Date     HEMORRHOIDECTOMY  1981    in [place]     LAPAROSCOPIC SALPINGO-OOPHORECTOMY Bilateral 10/7/2019    Procedure: Diagnostic laparoscopy, exploratory laparotomy, Bilateral SALPINGO-OOPHORECTOMY, lysis of adhesions;  Surgeon: Prashant Sawyer MD;  Location:  OR     Family History   Problem Relation Age of Onset     Cerebrovascular Disease Mother         CVA     Social History     Socioeconomic History     Marital status:      Spouse name: Not on file     Number of children: Not on file     Years of education: Not on file     Highest education level: Not on file   Occupational History     Occupation: Housekeeping     Employer: [employer]   Social Needs     Financial resource strain: Not on file     Food insecurity     Worry: Not on file     Inability: Not on file     Transportation needs     Medical: Not on file     Non-medical: Not on file   Tobacco Use     Smoking status: Never Smoker     Smokeless tobacco: Never Used   Substance and Sexual Activity     Alcohol use: No     Drug use: No     Sexual activity: Yes     Partners: Male   Lifestyle     Physical " activity     Days per week: Not on file     Minutes per session: Not on file     Stress: Not on file   Relationships     Social connections     Talks on phone: Not on file     Gets together: Not on file     Attends Alevism service: Not on file     Active member of club or organization: Not on file     Attends meetings of clubs or organizations: Not on file     Relationship status: Not on file     Intimate partner violence     Fear of current or ex partner: Not on file     Emotionally abused: Not on file     Physically abused: Not on file     Forced sexual activity: Not on file   Other Topics Concern     Parent/sibling w/ CABG, MI or angioplasty before 65F 55M? No   Social History Narrative     Not on file         Objective  LMP  (LMP Unknown)    /84    GENERAL APPEARANCE: healthy, alert and no distress   GAIT:   SKIN: no suspicious lesions or rashes  NEURO: mentation intact and speech normal  PSYCH:  mentation appears normal and affect normal/bright    HEENT: no scleral icterus  CV:   RESP: nonlabored breathing      Exam  Additional deferred with video visit.      Radiology  None additional. See previous notes.        Note: Time spent in one-on-one evaluation and discussion with the patient regarding the nature of problem, course, prior treatments, and therapeutic options, along with review of medical record (including outside sources/documentation), and completing documentation: 20 minutes.        This note consists of symbols derived from keyboarding, dictation and/or voice recognition software. As a result, there may be errors in the script that have gone undetected. Please consider this when interpreting information found in this chart.          Again, thank you for allowing me to participate in the care of your patient.        Sincerely,        Ronen Mcmahon,

## 2021-04-07 NOTE — LETTER
REPORT OF WORK ABILITY    NOTE TO EMPLOYEE: You must promptly provide a copy of this report to your  employer or worker's compensation insurer, and Qualified Rehabilitation Consultant.    Date: April 7, 2021                   Employee Name: Gerardo Valenzuela         YOB: 1960  Medical Record Number: [id number]   Soc.Sec.No: [id number]  Employer: None                Date of Injury: 9/12/20  Managed Care Organization / Insurance Company Name: UNKNOWN    Diagnosis: right knee injury, increased symptoms with return to work  Work Related: yes     MMI: NO  Permanent Partial Disability (PPD) likely: UNKNOWN    EMPLOYEE IS ABLE TO WORK: Return to work with restrictions on next scheduled work date. Restrictions in place 6 weeks.     RESTRICTIONS IF ANY:  Remain unchanged.  Stand:  Frequently (4-6 hours)  Sit:  Full time is ok  Walk: frequently (4-6 hours)  Kneel/[place]:  Not at all (0 hours)  Lift, carry:  Up to 20 lbs  Push/Pull:  Up to 20 lbs      OTHER RESTRICTIONS: None    TREATMENT PLAN/NOTES: change work position for comfort, best work height mid chest to mid thigh, may need to restrict work activities for comfort, may continue with knee brace and cold pack for comfort, Elevate and Rest if needed. Continue therapy exercises also.            Ronen NIX.

## 2021-04-07 NOTE — LETTER
REPORT OF WORK ABILITY    NOTE TO EMPLOYEE: You must promptly provide a copy of this report to your  employer or worker's compensation insurer, and Qualified Rehabilitation Consultant.    Date: April 7, 2021                   Employee Name: Gerardo Valenzuela         YOB: 1960  Medical Record Number: [id number]   Soc.Sec.No: [id number]  Employer: None                Date of Injury: 9/12/20  Managed Care Organization / Insurance Company Name: UNKNOWN    Diagnosis: right knee injury, increased symptoms with return to work  Work Related: yes     MMI: NO  Permanent Partial Disability (PPD) likely: UNKNOWN    EMPLOYEE IS ABLE TO WORK: Return to work with restrictions on next scheduled work date. Restrictions in place 6 weeks.     RESTRICTIONS IF ANY:  Remain unchanged.  Stand:  Frequently (4-6 hours)  Sit:  Full time is ok  Walk: frequently (4-6 hours)  Kneel/[place]:  Not at all (0 hours)  Lift, carry:  Up to 20 lbs  Push/Pull:  Up to 20 lbs      OTHER RESTRICTIONS: None    TREATMENT PLAN/NOTES: change work position for comfort, best work height mid chest to mid thigh, may need to restrict work activities for comfort, may continue with knee brace and cold pack for comfort, Elevate and Rest if needed. Continue therapy exercises also.            Ronen NIX.

## 2021-04-07 NOTE — PATIENT INSTRUCTIONS
Continue with therapy  Bracing for comfort  Updated work letter  Follow up 6 weeks    If you have any further questions for your physician or physician s care team you can call [phone] and use option 3 to leave a voice message. Calls received during business hours will be returned same day.

## 2021-04-07 NOTE — PROGRESS NOTES
"Gerardo is a 60 year old who is being evaluated via a billable video visit.      How would you like to obtain your AVS? MyChart  If the video visit is dropped, the invitation should be resent by: Text to cell phone: [phone]  Will anyone else be joining your video visit? No    Video Start Time: 10:01 AM  Video-Visit Details    Type of service:  Video Visit    Video End Time:10:14 AM    Originating Location (pt. Location): Home    Distant Location (provider location):  [facility]     Platform used for Video Visit: [facility] Visit      Assessment:  1. Acute medial meniscal injury of right knee, subsequent encounter    2. Right knee pain, unspecified chronicity        Plan:  Discussed the assessment with the patient.  Going ok with light duty. Continue with current restrictions. New letter provided.  Continue with physical therapy.  Bracing for comfort.  We may consider a repeat injection in the future, if persistent issues.  Follow up: 6 weeks, sooner prn.      See Patient Instructions  Patient Instructions   Continue with therapy  Bracing for comfort  Updated work letter  Follow up 6 weeks    If you have any further questions for your physician or physician s care team you can call [phone] and use option 3 to leave a voice message. Calls received during business hours will be returned same day.              Ronen Mcmahon DO  [facility]      ==========================================      PCP: Bibiana Najera    Gerardo Valenzuela is a 60 year old female who is seen in f/u up for    Acute medial meniscal injury of right knee, subsequent encounter  Right knee pain, unspecified chronicity. Since last visit on 3/1/2021 virtual visit,  patient has had some relief from the injection.  Feels that it did help, but \"not 100%\".   She has continued to use her cane for ambulation and brace.   She has continued " to work light duty.  Feels she is able to maintain this work.   **  No longer with swelling.  Trying to walk without brace, and that is challenging.  Working--light duty, some cleaning.  Has more PT planned.    Review of Systems  All other systems reviewed and are negative unless noted above.    Past Medical History:   Diagnosis Date     GERD (gastroesophageal reflux disease)      Mixed hyperlipidemia 8/20/2018     Pelvic mass in female 10/1/2019    Added automatically from request for surgery [id number]     Prediabetes 8/20/2018     Past Surgical History:   Procedure Laterality Date     HEMORRHOIDECTOMY  1981    in [place]     LAPAROSCOPIC SALPINGO-OOPHORECTOMY Bilateral 10/7/2019    Procedure: Diagnostic laparoscopy, exploratory laparotomy, Bilateral SALPINGO-OOPHORECTOMY, lysis of adhesions;  Surgeon: Prashant Sawyer MD;  Location:  OR     Family History   Problem Relation Age of Onset     Cerebrovascular Disease Mother         CVA     Social History     Socioeconomic History     Marital status:      Spouse name: Not on file     Number of children: Not on file     Years of education: Not on file     Highest education level: Not on file   Occupational History     Occupation: Housekeeping     Employer: [employer]   Social Needs     Financial resource strain: Not on file     Food insecurity     Worry: Not on file     Inability: Not on file     Transportation needs     Medical: Not on file     Non-medical: Not on file   Tobacco Use     Smoking status: Never Smoker     Smokeless tobacco: Never Used   Substance and Sexual Activity     Alcohol use: No     Drug use: No     Sexual activity: Yes     Partners: Male   Lifestyle     Physical activity     Days per week: Not on file     Minutes per session: Not on file     Stress: Not on file   Relationships     Social connections     Talks on phone: Not on file     Gets together: Not on file     Attends Uatsdin service: Not on file     Active member of club  or organization: Not on file     Attends meetings of clubs or organizations: Not on file     Relationship status: Not on file     Intimate partner violence     Fear of current or ex partner: Not on file     Emotionally abused: Not on file     Physically abused: Not on file     Forced sexual activity: Not on file   Other Topics Concern     Parent/sibling w/ CABG, MI or angioplasty before 65F 55M? No   Social History Narrative     Not on file         Objective  LMP  (LMP Unknown)    /84    GENERAL APPEARANCE: healthy, alert and no distress   GAIT:   SKIN: no suspicious lesions or rashes  NEURO: mentation intact and speech normal  PSYCH:  mentation appears normal and affect normal/bright    HEENT: no scleral icterus  CV:   RESP: nonlabored breathing      Exam  Additional deferred with video visit.      Radiology  None additional. See previous notes.        Note: Time spent in one-on-one evaluation and discussion with the patient regarding the nature of problem, course, prior treatments, and therapeutic options, along with review of medical record (including outside sources/documentation), and completing documentation: 20 minutes.        This note consists of symbols derived from keyboarding, dictation and/or voice recognition software. As a result, there may be errors in the script that have gone undetected. Please consider this when interpreting information found in this chart.

## 2021-04-13 ENCOUNTER — THERAPY VISIT (OUTPATIENT)
Dept: PHYSICAL THERAPY | Facility: CLINIC | Age: 61
End: 2021-04-13
Payer: COMMERCIAL

## 2021-04-13 ENCOUNTER — MYC MEDICAL ADVICE (OUTPATIENT)
Dept: ORTHOPEDICS | Facility: CLINIC | Age: 61
End: 2021-04-13

## 2021-04-13 DIAGNOSIS — M25.561 CHRONIC PAIN OF RIGHT KNEE: ICD-10-CM

## 2021-04-13 DIAGNOSIS — G89.29 CHRONIC PAIN OF RIGHT KNEE: ICD-10-CM

## 2021-04-13 PROCEDURE — 97112 NEUROMUSCULAR REEDUCATION: CPT | Mod: GP | Performed by: PHYSICAL THERAPIST

## 2021-04-13 PROCEDURE — 97110 THERAPEUTIC EXERCISES: CPT | Mod: GP | Performed by: PHYSICAL THERAPIST

## 2021-04-27 ENCOUNTER — THERAPY VISIT (OUTPATIENT)
Dept: PHYSICAL THERAPY | Facility: CLINIC | Age: 61
End: 2021-04-27
Payer: COMMERCIAL

## 2021-04-27 DIAGNOSIS — M25.561 CHRONIC PAIN OF RIGHT KNEE: ICD-10-CM

## 2021-04-27 DIAGNOSIS — G89.29 CHRONIC PAIN OF RIGHT KNEE: ICD-10-CM

## 2021-04-27 PROCEDURE — 97110 THERAPEUTIC EXERCISES: CPT | Mod: GP | Performed by: PHYSICAL THERAPIST

## 2021-05-11 ENCOUNTER — THERAPY VISIT (OUTPATIENT)
Dept: PHYSICAL THERAPY | Facility: CLINIC | Age: 61
End: 2021-05-11
Payer: COMMERCIAL

## 2021-05-11 DIAGNOSIS — G89.29 CHRONIC PAIN OF RIGHT KNEE: ICD-10-CM

## 2021-05-11 DIAGNOSIS — M25.561 CHRONIC PAIN OF RIGHT KNEE: ICD-10-CM

## 2021-05-11 PROCEDURE — 97110 THERAPEUTIC EXERCISES: CPT | Mod: GP | Performed by: PHYSICAL THERAPIST

## 2021-05-21 ENCOUNTER — VIRTUAL VISIT (OUTPATIENT)
Dept: ORTHOPEDICS | Facility: CLINIC | Age: 61
End: 2021-05-21
Payer: OTHER MISCELLANEOUS

## 2021-05-21 DIAGNOSIS — M25.561 RIGHT KNEE PAIN, UNSPECIFIED CHRONICITY: Primary | ICD-10-CM

## 2021-05-21 DIAGNOSIS — M17.11 PRIMARY OSTEOARTHRITIS OF RIGHT KNEE: ICD-10-CM

## 2021-05-21 DIAGNOSIS — S83.8X1D ACUTE MEDIAL MENISCAL INJURY OF RIGHT KNEE, SUBSEQUENT ENCOUNTER: ICD-10-CM

## 2021-05-21 PROCEDURE — 99212 OFFICE O/P EST SF 10 MIN: CPT | Mod: TEL | Performed by: PEDIATRICS

## 2021-05-21 NOTE — LETTER
REPORT OF WORK ABILITY    NOTE TO EMPLOYEE: You must promptly provide a copy of this report to your  employer or worker's compensation insurer, and Qualified Rehabilitation Consultant.    Date: May 21, 2021                   Employee Name: Gerardo Valenzuela         YOB: 1960  Medical Record Number: [id number]   Soc.Sec.No: [id number]  Employer: None                Date of Injury: 9/12/20  Managed Care Organization / Insurance Company Name: UNKNOWN    Diagnosis: right knee injury, increased symptoms with return to work; underlying knee arthrosis and meniscus tear  Work Related: yes     MMI: NO  Permanent Partial Disability (PPD) likely: UNKNOWN    EMPLOYEE IS ABLE TO WORK: Return to work with restrictions on next scheduled work date. Restrictions in place 6 weeks.     RESTRICTIONS IF ANY:  Remain unchanged.  Stand:  Frequently (4-6 hours)  Sit:  Full time is ok  Walk: frequently (4-6 hours)  Kneel/[place]:  Not at all (0 hours)  Lift, carry:  Up to 20 lbs  Push/Pull:  Up to 20 lbs      OTHER RESTRICTIONS: None    TREATMENT PLAN/NOTES: change work position for comfort, best work height mid chest to mid thigh, may need to restrict work activities for comfort, may continue with knee brace and cold pack for comfort, Elevate and Rest if needed. Continue therapy exercises also.            Ronen NIX.

## 2021-05-21 NOTE — PROGRESS NOTES
Gerardo is a 60 year old who is being evaluated via a billable video visit.      How would you like to obtain your AVS? MyChart  If the video visit is dropped, the invitation should be resent by: Text to cell phone:    Will anyone else be joining your video visit? No    Video Start Time:     Assessment & Plan     (M2.561) Right knee pain, unspecified chronicity  (primary encounter diagnosis)  Comment:   Plan:     (S83.8X1D) Acute medial meniscal injury of right knee, subsequent encounter  Comment:   Plan:     (M17.11) Primary osteoarthritis of right knee  Comment:   Plan:    Started with video, converted to phone given suboptimal connection.  Status quo. Working, going ok with restricted work.  Continue current tx.  Follow-up 6 weeks.  Questions answered. Discussed signs and symptoms that may indicate more serious issues; the patient was instructed to seek appropriate care if noted. Gerardo indicates understanding of these issues and agrees with the plan.        Patient Instructions   Updated letter for work  Follow up 6 weeks    Dr Ronen Mcmahon will be on a planned leave of absence and out of the office June 1 through August 31. His colleagues are still providing care at [facility] in [place] in his absence. Dr Krystyna Carrillo, Dr Ronen Kellogg, and Dr Nicholas Jolly will all be seeing patients and managing urgent issues for patients during this time. Feel free to contact the clinic if there are any questions.  Clinic phone: [phone]        Ronen Mcmahon, DO  [facility]      =======================    Subjective   Gerardo is a 60 year old who presents for the following health issues :  F/u knee issues.  See previous notes.  Feels like restricted work is going ok.  Continue with therapy exercises.  We reviewed other options, with repeat steroid injection, potential for return to see ortho surgeon if ongoing issues despite conservative  "management.    Follow-up 6 weeks, sooner prn.  Questions answered. Discussed signs and symptoms that may indicate more serious issues; the patient was instructed to seek appropriate care if noted. Letebrhan indicates understanding of these issues and agrees with the plan.        HPI         Review of Systems         Objective           BMI:   Estimated body mass index is 28.72 kg/m  as calculated from the following:    Height as of 2/22/21: 1.549 m (5' 1\").    Weight as of 2/26/21: 68.9 kg (152 lb).             Vitals:  No vitals were obtained today due to virtual visit.    Physical Exam   GENERAL: Healthy, alert and no distress  RESP: No audible wheeze, cough, or visible cyanosis.  No visible retractions or increased work of breathing.    PSYCH: Mentation appears normal, affect normal/bright, judgement and insight intact, normal speech and appearance well-groomed.        Video time: 5 mins  Phone time: 11 mins        Video-Visit Details    Type of service:  Video Visit    Video End Time:    Originating Location (pt. Location): Home    Distant Location (provider location):  [facility]     Platform used for Video Visit: Tori    "

## 2021-05-21 NOTE — LETTER
5/21/2021         RE: Gerardo Valenzuela  [address] [place] [address]  [place] [address]        Dear Colleague,    Thank you for referring your patient, Gerardo Valenzuela, to the [facility]. Please see a copy of my visit note below.    Gerardo is a 60 year old who is being evaluated via a billable video visit.      How would you like to obtain your AVS? MyChart  If the video visit is dropped, the invitation should be resent by: Text to cell phone:    Will anyone else be joining your video visit? No    Video Start Time:     Assessment & Plan     (M25.561) Right knee pain, unspecified chronicity  (primary encounter diagnosis)  Comment:   Plan:     (S83.8X1D) Acute medial meniscal injury of right knee, subsequent encounter  Comment:   Plan:     (M17.11) Primary osteoarthritis of right knee  Comment:   Plan:    Started with video, converted to phone given suboptimal connection.  Status quo. Working, going ok with restricted work.  Continue current tx.  Follow-up 6 weeks.  Questions answered. Discussed signs and symptoms that may indicate more serious issues; the patient was instructed to seek appropriate care if noted. Gerardo indicates understanding of these issues and agrees with the plan.        Patient Instructions   Updated letter for work  Follow up 6 weeks    Dr Ronen Mcmahon will be on a planned leave of absence and out of the office June 1 through August 31. His colleagues are still providing care at [facility] in [place] in his absence. Dr Krystyna Carrillo, Dr Ronen Kellogg, and Dr Nicholas Jolly will all be seeing patients and managing urgent issues for patients during this time. Feel free to contact the clinic if there are any questions.  Clinic phone: [phone]        Ronen Mcmahon, DO  [facility]      =======================    Subjective   Gerardo is a 60 year old who presents for the following health  "issues :  F/u knee issues.  See previous notes.  Feels like restricted work is going ok.  Continue with therapy exercises.  We reviewed other options, with repeat steroid injection, potential for return to see ortho surgeon if ongoing issues despite conservative management.    Follow-up 6 weeks, sooner prn.  Questions answered. Discussed signs and symptoms that may indicate more serious issues; the patient was instructed to seek appropriate care if noted. Letebrhan indicates understanding of these issues and agrees with the plan.        HPI         Review of Systems         Objective           BMI:   Estimated body mass index is 28.72 kg/m  as calculated from the following:    Height as of 2/22/21: 1.549 m (5' 1\").    Weight as of 2/26/21: 68.9 kg (152 lb).             Vitals:  No vitals were obtained today due to virtual visit.    Physical Exam   GENERAL: Healthy, alert and no distress  RESP: No audible wheeze, cough, or visible cyanosis.  No visible retractions or increased work of breathing.    PSYCH: Mentation appears normal, affect normal/bright, judgement and insight intact, normal speech and appearance well-groomed.        Video time: 5 mins  Phone time: 11 mins        Video-Visit Details    Type of service:  Video Visit    Video End Time:    Originating Location (pt. Location): Home    Distant Location (provider location):  [facility]     Platform used for Video Visit: AmWell        Again, thank you for allowing me to participate in the care of your patient.        Sincerely,        Ronen Mcmahon, DO    "

## 2021-05-25 ENCOUNTER — THERAPY VISIT (OUTPATIENT)
Dept: PHYSICAL THERAPY | Facility: CLINIC | Age: 61
End: 2021-05-25
Payer: COMMERCIAL

## 2021-05-25 DIAGNOSIS — G89.29 CHRONIC PAIN OF RIGHT KNEE: ICD-10-CM

## 2021-05-25 DIAGNOSIS — M25.561 CHRONIC PAIN OF RIGHT KNEE: ICD-10-CM

## 2021-05-25 PROCEDURE — 97110 THERAPEUTIC EXERCISES: CPT | Mod: GP | Performed by: PHYSICAL THERAPIST

## 2021-06-03 NOTE — PATIENT INSTRUCTIONS
Updated letter for work  Follow up 6 weeks    Dr Ronen Mcmahon will be on a planned leave of absence and out of the office June 1 through August 31. His colleagues are still providing care at [facility] in [place] in his absence. Dr Krystyna Carrillo, Dr Ronen Kellogg, and Dr Nicholas Jolly will all be seeing patients and managing urgent issues for patients during this time. Feel free to contact the clinic if there are any questions.  Clinic phone: [phone]

## 2021-06-30 ENCOUNTER — OFFICE VISIT (OUTPATIENT)
Dept: ORTHOPEDICS | Facility: CLINIC | Age: 61
End: 2021-06-30
Payer: COMMERCIAL

## 2021-06-30 VITALS
DIASTOLIC BLOOD PRESSURE: 75 MMHG | WEIGHT: 152 LBS | BODY MASS INDEX: 27.97 KG/M2 | HEIGHT: 62 IN | SYSTOLIC BLOOD PRESSURE: 123 MMHG

## 2021-06-30 DIAGNOSIS — M84.469D INSUFFICIENCY FRACTURE OF TIBIA WITH ROUTINE HEALING, SUBSEQUENT ENCOUNTER: ICD-10-CM

## 2021-06-30 DIAGNOSIS — M25.561 CHRONIC PAIN OF RIGHT KNEE: Primary | ICD-10-CM

## 2021-06-30 DIAGNOSIS — G89.29 CHRONIC PAIN OF RIGHT KNEE: Primary | ICD-10-CM

## 2021-06-30 DIAGNOSIS — S83.8X1D ACUTE MEDIAL MENISCAL INJURY OF RIGHT KNEE, SUBSEQUENT ENCOUNTER: ICD-10-CM

## 2021-06-30 PROCEDURE — 99214 OFFICE O/P EST MOD 30 MIN: CPT | Performed by: PEDIATRICS

## 2021-06-30 ASSESSMENT — MIFFLIN-ST. JEOR: SCORE: 1202.72

## 2021-06-30 NOTE — LETTER
[facility] [place]  [address]  [place] [address]  Phone: [phone]  Fax: [phone]  REPORT OF WORK ABILITY    NOTE TO EMPLOYEE: You must promptly provide a copy of this report to your  employer or worker's compensation insurer, and Qualified Rehabilitation Consultant.    Date: 6/30/2021                     Employee Name: Gerardo Valenzuela         YOB: 1960  Medical Record Number: [id number]   Soc.Sec.No: [id number]  Employer: None                Date of Injury: 9/12/20  Managed Care Organization / Insurance Company Name: UNKNOWN     Diagnosis: right knee injury, increased symptoms with return to work; underlying knee arthrosis and meniscus tear  Work Related: yes     MMI: NO  Permanent Partial Disability (PPD) likely: UNKNOWN     EMPLOYEE IS ABLE TO WORK: Return to work with restrictions on next scheduled work date.     RESTRICTIONS IF ANY:  Remain unchanged.  Stand:  Frequently (4-6 hours)  Sit:  Full time is ok  Walk: frequently (4-6 hours)  Kneel/[place]:  Not at all (0 hours)  Lift, carry:  Up to 20 lbs  Push/Pull:  Up to 20 lbs     OTHER RESTRICTIONS: None     TREATMENT PLAN/NOTES: change work position for comfort, best work height mid chest to mid thigh, may need to restrict work activities for comfort, may continue with knee brace and cold pack for comfort, Elevate and Rest if needed. Continue physical therapy.  - Consider repeat corticosteroid injection, Synvisc Injection, referral to orthopedic surgery  - Follow up in 2 months with Dr. Mcmahon, sooner if needed    Krystyna Carrillo MD

## 2021-06-30 NOTE — PATIENT INSTRUCTIONS
Plan:  - Today's Plan of Care:  Continue bracing  Continue physical therapy  Rehab: Physical Therapy: [facility] - [phone]  Work Letter Updated    -We also discussed other future treatment options:  Repeat corticosteroid injection  Consideration of Synvisc Injection  Referral back to Orthopedic Surgery    Follow Up: 2 months with Dr. Mcmahon, call earlier if desiring repeat injections or referrals    If you have any further questions for your physician or physician s care team you can call [phone] and use option 3 to leave a voice message. Calls received during business hours will be returned same day.

## 2021-06-30 NOTE — LETTER
6/30/2021         RE: Gerardo Valenzuela  [address] [place] [id number]        Dear Colleague,    Thank you for referring your patient, Gerardo Valenzuela, to the [facility] [place]. Please see a copy of my visit note below.    ASSESSMENT & PLAN    Gerardo was seen today for pain and follow up.    Diagnoses and all orders for this visit:    Chronic pain of right knee  -     [facility]; Future    Acute medial meniscal injury of right knee, subsequent encounter  -     [facility]; Future    Insufficiency fracture of tibia with routine healing, subsequent encounter  -     [facility]; Future      This issue is chronic and Unchanged.  Patient would like to continue with bracing and physical therapy.  Given persistent symptoms, reviewed other options including trial of repeat injection, hyaluronic acid injections, referral back to orthopaedic surgery.  Patient will consider these options.    Plan:  - Today's Plan of Care:  Continue bracing  Continue physical therapy  Rehab: Physical Therapy: [place] - [phone]  Work Letter Updated    -We also discussed other future treatment options:  Repeat corticosteroid injection  Consideration of Synvisc Injection  Referral back to Orthopedic Surgery    Follow Up: 2 months with Dr. Mcmahon, call earlier if desiring repeat injections or referrals    Concerning signs and symptoms were reviewed.  The patient expressed understanding of this management plan and all questions were answered at this time.    Krystyna Carrillo MD [facility]  Sports Medicine Physician  [facility]      SUBJECTIVE- Interim History June 30, 2021    Chief Complaint   Patient presents with     Right Knee - Pain, Follow Up       Gerardo Valenzuela is a 61 year old female who is seen in f/u up for    Chronic pain of right knee  Acute medial meniscal injury of right knee, subsequent  "encounter  Insufficiency fracture of tibia with routine healing, subsequent encounter.  Since last visit on 5/21/21 with Dr. Mcmahon virtually patient has been feeling about the same.  She has pain with walking, she feels that she looses balance while walking. She reports swelling and pain with walking, sitting, and standing.  - September 2020 was initial injury  - Has been doing physical therapy and wearing a medial  brace, obtained an injection with Dr. Moreno 2/26/2021 which didn't help.    Reviewed plan from Dr. Mcmahon and Dr. Moreno.  - Could consider repeat injection (repeat corticosteroid v. Hyaluronic acid)    Worsened by: walking, sitting  Better with: ice, hot pack, bracing, resting, home exercises  Treatments tried: rest/activity avoidance, ice, heat, Tylenol, ibuprofen, home exercises and casting/splinting/bracing  Associated symptoms:  swelling, weakness of knee, feeling of instability and clicking/grinding    The patient is seen by themselves.    Orthopedic/Surgical history: NO, insufficiency fracture 9/2020  Social History/Occupation: house keeping, unemployed    No family history pertinent to patient's problem today.    REVIEW OF SYSTEMS:  Review of Systems  Skin: no bruising, no swelling  Musculoskeletal: as above  Neurologic: no numbness, paresthesias  Remainder of review of systems is negative including constitutional, CV, pulmonary, GI, except as noted in HPI or medical history.    OBJECTIVE:  /75   Ht 1.575 m (5' 2\")   Wt 68.9 kg (152 lb)   LMP  (LMP Unknown)   BMI 27.80 kg/m       GENERAL APPEARANCE: healthy, alert and no distress   GAIT: antalgic  SKIN: no suspicious lesions or rashes  HEENT: Sclera clear, anicteric  CV: good peripheral pulses  RESP: Breathing not labored  NEURO: Normal strength and tone, mentation intact and speech normal  PSYCH:  mentation appears normal and affect normal/bright    Right Knee exam    Inspection:      no effusion, swelling of bruising " bilateral    Patella:      Crepitus noted in the patellofemoral joint right    Tender:      medial patellar border right       lateral patellar border right    Non Tender:      remainder of knee area bilateral    Knee ROM:      Flexion 120 degrees right       Extension 0 degrees right    Strength:      5/5 with knee extension right    Special Tests:     neg (-) Jessie right       neg (-) anterior drawer right       neg (-) posterior drawer right       neg (-) varus at 0 deg and 30 deg right       neg (-) valgus at 0 deg and 30 deg right    Gait:      normal    Neurovascular:      2+ peripheral pulses bilaterally and brisk capillary refill       sensation grossly intact    RADIOLOGY:  Final results and radiologist's interpretation, available in the [facility] health record.  Images were reviewed with the patient in the office today.  My personal interpretation of the performed imaging:  MRI right knee 10/23/2020 - medial meniscal avulsion, chondromalacia    XR 12/21/2020 - joint space maintained, no severe narrowing      Review of prior external note(s) from - orthopedic surgery  Review of the result(s) of each unique test - XR and MRI           Again, thank you for allowing me to participate in the care of your patient.        Sincerely,        Krystyna Carrillo MD

## 2021-06-30 NOTE — PROGRESS NOTES
ASSESSMENT & PLAN    Gerardo was seen today for pain and follow up.    Diagnoses and all orders for this visit:    Chronic pain of right knee  -     [facility]; Future    Acute medial meniscal injury of right knee, subsequent encounter  -     [facility]; Future    Insufficiency fracture of tibia with routine healing, subsequent encounter  -     [facility]; Future      This issue is chronic and Unchanged.  Patient would like to continue with bracing and physical therapy.  Given persistent symptoms, reviewed other options including trial of repeat injection, hyaluronic acid injections, referral back to orthopaedic surgery.  Patient will consider these options.    Plan:  - Today's Plan of Care:  Continue bracing  Continue physical therapy  Rehab: Physical Therapy: [place] - [phone]  Work Letter Updated    -We also discussed other future treatment options:  Repeat corticosteroid injection  Consideration of Synvisc Injection  Referral back to Orthopedic Surgery    Follow Up: 2 months with Dr. Mcmahon, call earlier if desiring repeat injections or referrals    Concerning signs and symptoms were reviewed.  The patient expressed understanding of this management plan and all questions were answered at this time.    Krystyna Carrillo MD [facility]  Sports Medicine Physician  [facility]      SUBJECTIVE- Interim History June 30, 2021    Chief Complaint   Patient presents with     Right Knee - Pain, Follow Up       Gerardo Valenzuela is a 61 year old female who is seen in f/u up for    Chronic pain of right knee  Acute medial meniscal injury of right knee, subsequent encounter  Insufficiency fracture of tibia with routine healing, subsequent encounter.  Since last visit on 5/21/21 with Dr. Mcmahon virtually patient has been feeling about the same.  She has pain with walking, she feels that she looses balance while walking. She reports swelling  "and pain with walking, sitting, and standing.  - September 2020 was initial injury  - Has been doing physical therapy and wearing a medial  brace, obtained an injection with Dr. Moreno 2/26/2021 which didn't help.    Reviewed plan from Dr. Mcmahon and Dr. Moreno.  - Could consider repeat injection (repeat corticosteroid v. Hyaluronic acid)    Worsened by: walking, sitting  Better with: ice, hot pack, bracing, resting, home exercises  Treatments tried: rest/activity avoidance, ice, heat, Tylenol, ibuprofen, home exercises and casting/splinting/bracing  Associated symptoms:  swelling, weakness of knee, feeling of instability and clicking/grinding    The patient is seen by themselves.    Orthopedic/Surgical history: NO, insufficiency fracture 9/2020  Social History/Occupation: house keeping, unemployed    No family history pertinent to patient's problem today.    REVIEW OF SYSTEMS:  Review of Systems  Skin: no bruising, no swelling  Musculoskeletal: as above  Neurologic: no numbness, paresthesias  Remainder of review of systems is negative including constitutional, CV, pulmonary, GI, except as noted in HPI or medical history.    OBJECTIVE:  /75   Ht 1.575 m (5' 2\")   Wt 68.9 kg (152 lb)   LMP  (LMP Unknown)   BMI 27.80 kg/m       GENERAL APPEARANCE: healthy, alert and no distress   GAIT: antalgic  SKIN: no suspicious lesions or rashes  HEENT: Sclera clear, anicteric  CV: good peripheral pulses  RESP: Breathing not labored  NEURO: Normal strength and tone, mentation intact and speech normal  PSYCH:  mentation appears normal and affect normal/bright    Right Knee exam    Inspection:      no effusion, swelling of bruising bilateral    Patella:      Crepitus noted in the patellofemoral joint right    Tender:      medial patellar border right       lateral patellar border right    Non Tender:      remainder of knee area bilateral    Knee ROM:      Flexion 120 degrees right       Extension 0 degrees " right    Strength:      5/5 with knee extension right    Special Tests:     neg (-) Jessie right       neg (-) anterior drawer right       neg (-) posterior drawer right       neg (-) varus at 0 deg and 30 deg right       neg (-) valgus at 0 deg and 30 deg right    Gait:      normal    Neurovascular:      2+ peripheral pulses bilaterally and brisk capillary refill       sensation grossly intact    RADIOLOGY:  Final results and radiologist's interpretation, available in the [facility] health record.  Images were reviewed with the patient in the office today.  My personal interpretation of the performed imaging:  MRI right knee 10/23/2020 - medial meniscal avulsion, chondromalacia    XR 12/21/2020 - joint space maintained, no severe narrowing      Review of prior external note(s) from - orthopedic surgery  Review of the result(s) of each unique test - XR and MRI

## 2021-07-08 ENCOUNTER — THERAPY VISIT (OUTPATIENT)
Dept: PHYSICAL THERAPY | Facility: CLINIC | Age: 61
End: 2021-07-08
Payer: COMMERCIAL

## 2021-07-08 DIAGNOSIS — M25.561 CHRONIC PAIN OF RIGHT KNEE: ICD-10-CM

## 2021-07-08 DIAGNOSIS — G89.29 CHRONIC PAIN OF RIGHT KNEE: ICD-10-CM

## 2021-07-08 PROCEDURE — 97112 NEUROMUSCULAR REEDUCATION: CPT | Mod: GP | Performed by: PHYSICAL THERAPIST

## 2021-07-08 PROCEDURE — 97110 THERAPEUTIC EXERCISES: CPT | Mod: GP | Performed by: PHYSICAL THERAPIST

## 2021-07-08 ASSESSMENT — ACTIVITIES OF DAILY LIVING (ADL)
HOW_WOULD_YOU_RATE_THE_CURRENT_FUNCTION_OF_YOUR_KNEE_DURING_YOUR_USUAL_DAILY_ACTIVITIES_ON_A_SCALE_FROM_0_TO_100_WITH_100_BEING_YOUR_LEVEL_OF_KNEE_FUNCTION_PRIOR_TO_YOUR_INJURY_AND_0_BEING_THE_INABILITY_TO_PERFORM_ANY_OF_YOUR_USUAL_DAILY_ACTIVITIES?: 25
KNEE_ACTIVITY_OF_DAILY_LIVING_SUM: 23
GIVING WAY, BUCKLING OR SHIFTING OF KNEE: THE SYMPTOM AFFECTS MY ACTIVITY MODERATELY
GO UP STAIRS: ACTIVITY IS VERY DIFFICULT
WALK: ACTIVITY IS VERY DIFFICULT
STIFFNESS: THE SYMPTOM AFFECTS MY ACTIVITY SEVERELY
PAIN: THE SYMPTOM AFFECTS MY ACTIVITY SEVERELY
HOW_WOULD_YOU_RATE_THE_OVERALL_FUNCTION_OF_YOUR_KNEE_DURING_YOUR_USUAL_DAILY_ACTIVITIES?: SEVERELY ABNORMAL
WEAKNESS: THE SYMPTOM AFFECTS MY ACTIVITY MODERATELY
AS_A_RESULT_OF_YOUR_KNEE_INJURY,_HOW_WOULD_YOU_RATE_YOUR_CURRENT_LEVEL_OF_DAILY_ACTIVITY?: ABNORMAL
RISE FROM A CHAIR: ACTIVITY IS VERY DIFFICULT
KNEE_ACTIVITY_OF_DAILY_LIVING_SCORE: 32.86
GO DOWN STAIRS: ACTIVITY IS VERY DIFFICULT
RAW_SCORE: 23
LIMPING: THE SYMPTOM AFFECTS MY ACTIVITY SLIGHTLY
SQUAT: ACTIVITY IS VERY DIFFICULT
SIT WITH YOUR KNEE BENT: ACTIVITY IS NOT DIFFICULT
STAND: ACTIVITY IS VERY DIFFICULT
KNEEL ON THE FRONT OF YOUR KNEE: I AM UNABLE TO DO THE ACTIVITY
SWELLING: THE SYMPTOM AFFECTS MY ACTIVITY SLIGHTLY

## 2021-07-08 NOTE — LETTER
[facility]  [address]  [address]  [facility] [address]  [phone]    2021    Re: Gerardo Valenzuela   :   1960  MRN:  [id number]   REFERRING PHYSICIAN:   MD [facility]  Date of Initial Evaluation:  2020  Visits:  Rxs Used: 17  Reason for Referral:  Chronic pain of right knee    EVALUATION SUMMARY    PROGRESS  REPORT  2021     SUBJECTIVE  Subjective: Patient was told to continue with therapy. She has good days and bad days. Pt was on light duty work but this is no longer available so pt is not working She is walking 2x/day about 8 blocks total each time. She gets the most pain when transitioning from sitting to standing and after doing her walk. She notes standing for more than 30 minutes to cook continues to be difficult & painful.      Current Pain level: 4/10 (at rest)    Initial Pain level: 9/10.   Changes in function:  Slow progress towards goals. Pt has done very well with regaining motion and being consistent with HEP for strength  Adverse reaction to treatment or activity: None    OBJECTIVE  Knee Activity of Daily Living Score: 32.86          Objective: R knee ROM: Flexion: 119, Extension: 0-1. Cont with strengthening program and initiated core program      ASSESSMENT/PLAN  Updated problem list and treatment plan: Diagnosis 1:  R knee   Pain -  hot/cold therapy, self management and home program  Decreased strength - therapeutic exercise, therapeutic activities and home program  Decreased function - therapeutic activities and home program  STG/LTGs have been met or progress has been made towards goals:  Goals updated to reflect pt's current status  Assessment of Progress: The patient's progress has plateaued.  Self Management Plans:  Patient has been instructed in a home treatment program.  I have re-evaluated this patient and find that the nature, scope, duration and  intensity of the therapy is appropriate for the medical condition of the patient.  Gerardo continues to require the following intervention to meet STG and LTG's:  PT      Re: Gerardo Valenzuela   :   1960    Recommendations:  This patient would benefit from continued therapy.     Frequency:  2 X a month, once daily  Duration:  for 1-2 months      Please refer to the daily flowsheet for treatment today, total treatment time and time spent performing 1:1 timed codes.      Thank you for your referral.    INQUIRIES  Therapist: Padmini Osuna, PT, DPT   [facility] [address]  [address] [place] [id number]  Phone: [phone]  Fax: [phone]

## 2021-07-09 NOTE — PROGRESS NOTES
Subjective:  HPI  Physical Exam           Objective:  System    Physical Exam    General     ROS    PROGRESS  REPORT  7/8/2021     SUBJECTIVE  Subjective: Patient was told to continue with therapy. She has good days and bad days. Pt was on light duty work but this is no longer available so pt is not working She is walking 2x/day about 8 blocks total each time. She gets the most pain when transitioning from sitting to standing and after doing her walk. She notes standing for more than 30 minutes to cook continues to be difficult & painful.      Current Pain level: 4/10 (at rest)    Initial Pain level: 9/10.   Changes in function:  Slow progress towards goals. Pt has done very well with regaining motion and being consistent with HEP for strength  Adverse reaction to treatment or activity: None    OBJECTIVE  Knee Activity of Daily Living Score: 32.86            Objective: R knee ROM: Flexion: 119, Extension: 0-1. Cont with strengthening program and initiated core program      ASSESSMENT/PLAN  Updated problem list and treatment plan: Diagnosis 1:  R knee   Pain -  hot/cold therapy, self management and home program  Decreased strength - therapeutic exercise, therapeutic activities and home program  Decreased function - therapeutic activities and home program  STG/LTGs have been met or progress has been made towards goals:  Goals updated to reflect pt's current status  Assessment of Progress: The patient's progress has plateaued.  Self Management Plans:  Patient has been instructed in a home treatment program.  I have re-evaluated this patient and find that the nature, scope, duration and intensity of the therapy is appropriate for the medical condition of the patient.  Gerardo continues to require the following intervention to meet STG and LTG's:  PT    Recommendations:  This patient would benefit from continued therapy.     Frequency:  2 X a month, once daily  Duration:  for 1-2 months        Please refer to the daily  flowsheet for treatment today, total treatment time and time spent performing 1:1 timed codes.

## 2021-07-16 ENCOUNTER — THERAPY VISIT (OUTPATIENT)
Dept: PHYSICAL THERAPY | Facility: CLINIC | Age: 61
End: 2021-07-16
Payer: OTHER MISCELLANEOUS

## 2021-07-16 DIAGNOSIS — M25.561 CHRONIC PAIN OF RIGHT KNEE: ICD-10-CM

## 2021-07-16 DIAGNOSIS — G89.29 CHRONIC PAIN OF RIGHT KNEE: ICD-10-CM

## 2021-07-16 PROCEDURE — 97112 NEUROMUSCULAR REEDUCATION: CPT | Mod: GP | Performed by: PHYSICAL THERAPIST

## 2021-07-16 PROCEDURE — 97110 THERAPEUTIC EXERCISES: CPT | Mod: GP | Performed by: PHYSICAL THERAPIST

## 2021-08-02 ENCOUNTER — THERAPY VISIT (OUTPATIENT)
Dept: PHYSICAL THERAPY | Facility: CLINIC | Age: 61
End: 2021-08-02
Attending: PEDIATRICS
Payer: COMMERCIAL

## 2021-08-02 DIAGNOSIS — G89.29 CHRONIC PAIN OF RIGHT KNEE: ICD-10-CM

## 2021-08-02 DIAGNOSIS — M25.561 CHRONIC PAIN OF RIGHT KNEE: ICD-10-CM

## 2021-08-02 PROCEDURE — 97110 THERAPEUTIC EXERCISES: CPT | Mod: GP | Performed by: PHYSICAL THERAPIST

## 2021-08-10 ENCOUNTER — THERAPY VISIT (OUTPATIENT)
Dept: PHYSICAL THERAPY | Facility: CLINIC | Age: 61
End: 2021-08-10
Payer: COMMERCIAL

## 2021-08-10 DIAGNOSIS — M25.561 CHRONIC PAIN OF RIGHT KNEE: ICD-10-CM

## 2021-08-10 DIAGNOSIS — G89.29 CHRONIC PAIN OF RIGHT KNEE: ICD-10-CM

## 2021-08-10 PROCEDURE — 97110 THERAPEUTIC EXERCISES: CPT | Mod: GP | Performed by: PHYSICAL THERAPIST

## 2021-08-17 ENCOUNTER — THERAPY VISIT (OUTPATIENT)
Dept: PHYSICAL THERAPY | Facility: CLINIC | Age: 61
End: 2021-08-17
Payer: COMMERCIAL

## 2021-08-17 DIAGNOSIS — G89.29 CHRONIC PAIN OF RIGHT KNEE: ICD-10-CM

## 2021-08-17 DIAGNOSIS — M25.561 CHRONIC PAIN OF RIGHT KNEE: ICD-10-CM

## 2021-08-17 PROCEDURE — 97530 THERAPEUTIC ACTIVITIES: CPT | Mod: GP | Performed by: PHYSICAL THERAPIST

## 2021-08-17 PROCEDURE — 97110 THERAPEUTIC EXERCISES: CPT | Mod: GP | Performed by: PHYSICAL THERAPIST

## 2021-08-17 NOTE — LETTER
[facility]  [address]  [address]  [place] [facility] [address]  [phone]    2021    Re: Gerardo Valenzuela   :   1960  MRN:  [id number]   REFERRING PHYSICIAN:   Krystyna Carrillo MD    [facility]  Date of Initial Evaluation:  2020  Visits:  Rxs Used: 21  Reason for Referral:  Chronic pain of right knee    EVALUATION SUMMARY    PROGRESS  REPORT  Progress reporting period is from 2020 to 2021        SUBJECTIVE  Subjective: Patient is doing exercises. She tried walking without  and was unable due to pain. She continues to use medial  brace anytime she is on her feet. She also continues to use SPC for ambulation. She is planning to try another injection for pain management.     Current Pain level: 5/10.     Initial Pain level: 9/10.   Changes in function:  Progress towards PT goals has plateaued  Adverse reaction to treatment or activity: None    OBJECTIVE  Changes noted in objective findings:      Knee AROM 2021 Extension Flexion   Left WNL WNL   Right 0-1 119     Knee AROM 2020 Extension Flexion   Left WNL WNL   Right 0-11 99     Objective:   Patient has continued to work extremely hard towards PT goals. She is walking 2x/day, is compliant with home exercise program and has always taken all recommendations from PT. Pt is grateful for her improvement but is still frustrated with pain levels and how her knee is affecting her lifestyle and ability to perform normal work duties.      Re: Gerardo Valenzuela   :   1960    ASSESSMENT/PLAN  Updated problem list and treatment plan: Diagnosis 1:  Insufficiency fracture of R tibia, R medial meniscal injury, Chronic R knee pain   Pain -  hot/cold therapy, self management and home program  Impaired muscle performance - neuro re-education and home program  Decreased function - therapeutic activities and home program  STG/LTGs  have been met or progress has been made towards goals:  Yes (See Goal flow sheet completed today.)  Assessment of Progress: The patient's progress has plateaued.  Self Management Plans:  Patient has been instructed in a home treatment program.  Gerardo continues to require the following intervention to meet STG and LTG's:  Follow-up with MD first and return to PT as needed pending long-term medical plan. Continue home exercise program.    Recommendations:  This patient would benefit from further evaluation.    Please refer to the daily flowsheet for treatment today, total treatment time and time spent performing 1:1 timed codes.        Thank you for your referral.    INQUIRIES  Therapist: Padmini Osuna, PT, DPT  [address]  [address] [place] [id number]  Phone: [phone]  Fax: [phone]

## 2021-08-17 NOTE — PROGRESS NOTES
HPI  Physical Exam  System  Physical Exam  General   ROS    PROGRESS  REPORT    Progress reporting period is from 11/12/2020 to 8/17/2021        SUBJECTIVE  Subjective: Patient is doing exercises. She tried walking without  and was unable due to pain. She continues to use medial  brace anytime she is on her feet. She also continues to use SPC for ambulation. She is planning to try another injection for pain management.     Current Pain level: 5/10.     Initial Pain level: 9/10.   Changes in function:  Progress towards PT goals has plateaued  Adverse reaction to treatment or activity: None    OBJECTIVE  Changes noted in objective findings:      Knee AROM 08/17/2021 Extension Flexion   Left WNL WNL   Right 0-1 119     Knee AROM 11/12/2020 Extension Flexion   Left WNL WNL   Right 0-11 99         Objective: Patient has continued to work extremely hard towards PT goals. She is walking 2x/day, is compliant with home exercise program and has always taken all recommendations from PT. Pt is grateful for her improvement but is still frustrated with pain levels and how her knee is affecting her lifestyle and ability to perform normal work duties.      ASSESSMENT/PLAN  Updated problem list and treatment plan: Diagnosis 1:  Insufficiency fracture of R tibia, R medial meniscal injury, Chronic R knee pain   Pain -  hot/cold therapy, self management and home program  Impaired muscle performance - neuro re-education and home program  Decreased function - therapeutic activities and home program  STG/LTGs have been met or progress has been made towards goals:  Yes (See Goal flow sheet completed today.)  Assessment of Progress: The patient's progress has plateaued.  Self Management Plans:  Patient has been instructed in a home treatment program.  Margueriterory continues to require the following intervention to meet STG and LTG's:  Follow-up with MD first and return to PT as needed pending long-term medical plan. Continue  home exercise program.    Recommendations:  This patient would benefit from further evaluation.    Please refer to the daily flowsheet for treatment today, total treatment time and time spent performing 1:1 timed codes.

## 2021-09-03 ENCOUNTER — OFFICE VISIT (OUTPATIENT)
Dept: ORTHOPEDICS | Facility: CLINIC | Age: 61
End: 2021-09-03
Payer: COMMERCIAL

## 2021-09-03 VITALS — SYSTOLIC BLOOD PRESSURE: 114 MMHG | DIASTOLIC BLOOD PRESSURE: 64 MMHG

## 2021-09-03 DIAGNOSIS — G89.29 CHRONIC PAIN OF RIGHT KNEE: Primary | ICD-10-CM

## 2021-09-03 DIAGNOSIS — M25.561 CHRONIC PAIN OF RIGHT KNEE: Primary | ICD-10-CM

## 2021-09-03 DIAGNOSIS — S83.8X1D ACUTE MEDIAL MENISCAL INJURY OF RIGHT KNEE, SUBSEQUENT ENCOUNTER: ICD-10-CM

## 2021-09-03 DIAGNOSIS — M17.11 PRIMARY OSTEOARTHRITIS OF RIGHT KNEE: ICD-10-CM

## 2021-09-03 PROCEDURE — 20610 DRAIN/INJ JOINT/BURSA W/O US: CPT | Mod: RT | Performed by: PEDIATRICS

## 2021-09-03 PROCEDURE — 99213 OFFICE O/P EST LOW 20 MIN: CPT | Mod: 25 | Performed by: PEDIATRICS

## 2021-09-03 RX ADMIN — TRIAMCINOLONE ACETONIDE 40 MG: 40 INJECTION, SUSPENSION INTRA-ARTICULAR; INTRAMUSCULAR at 09:36

## 2021-09-03 RX ADMIN — LIDOCAINE HYDROCHLORIDE 2 ML: 10 INJECTION, SOLUTION INFILTRATION; PERINEURAL at 09:36

## 2021-09-03 NOTE — PATIENT INSTRUCTIONS
Steroid injection done today, goal of pain relief.  Continue with  bracing for comfort, but may trial out of brace if desired, if comfortable.  Keep up with home exercises from physical therapy.  Updated letter for work; continue with work restrictions (will extend 1 month), though not working currently. If improving with the injection, contact the clinic and can trial return to work without restrictions.  Future considerations may include updating imaging (start with repeat x-ray), and referring back to discuss further with orthopedic surgery.    If you have any further questions for your physician or physician s care team you can call [phone] and use option 3 to leave a voice message. Calls received during business hours will be returned same day.        Injection Discharge Instructions      You may shower, however avoid swimming, tub baths or hot tubs for 24 hours following your procedure    You may have a mild to moderate increase in pain for several days following the injection.    It may take up to 14 days for the steroid medication to start working although you may feel the effect as early as a few days after the procedure.    You may use ice packs for 10-15 minutes, 3 to 4 times a day at the injection site for comfort    You may use anti-inflammatory medications (such as Ibuprofen or Aleve or Advil) if you are able to take safely, or acetaminophen (Tylenol) for pain control if necessary    If you were fasting, you may resume your normal diet and medications after the procedure    If you have diabetes, check your blood sugar more frequently than usual as your blood sugar may be higher than normal for 10-14 days following a steroid injection. Contact your doctor who manages your diabetes if your blood sugar is higher than usual      If you experience any of the following, call the clinic @ [phone]  - Fever over 100 degree F  - Swelling, bleeding, redness, drainage, warmth at the injection  site  - New or worsening pain

## 2021-09-03 NOTE — PROGRESS NOTES
ASSESSMENT & PLAN    Gerardo was seen today for recheck.    Diagnoses and all orders for this visit:    Chronic pain of right knee  -     Large Joint Injection/Arthocentesis: R knee joint    Acute medial meniscal injury of right knee, subsequent encounter  -     Large Joint Injection/Arthocentesis: R knee joint    Primary osteoarthritis of right knee  -     Large Joint Injection/Arthocentesis: R knee joint      This issue is chronic and persistent.  Interested in repeat steroid injection, done today.  Has  brace.  Continue HEP from PT.      See Patient Instructions  Patient Instructions   Steroid injection done today, goal of pain relief.  Continue with  bracing for comfort, but may trial out of brace if desired, if comfortable.  Keep up with home exercises from physical therapy.  Updated letter for work; continue with work restrictions (will extend 1 month), though not working currently. If improving with the injection, contact the clinic and can trial return to work without restrictions.  Future considerations may include updating imaging (start with repeat x-ray), and referring back to discuss further with orthopedic surgery.    If you have any further questions for your physician or physician s care team you can call [phone] and use option 3 to leave a voice message. Calls received during business hours will be returned same day.        Injection Discharge Instructions      You may shower, however avoid swimming, tub baths or hot tubs for 24 hours following your procedure    You may have a mild to moderate increase in pain for several days following the injection.    It may take up to 14 days for the steroid medication to start working although you may feel the effect as early as a few days after the procedure.    You may use ice packs for 10-15 minutes, 3 to 4 times a day at the injection site for comfort    You may use anti-inflammatory medications (such as Ibuprofen or Aleve or Advil)  if you are able to take safely, or acetaminophen (Tylenol) for pain control if necessary    If you were fasting, you may resume your normal diet and medications after the procedure    If you have diabetes, check your blood sugar more frequently than usual as your blood sugar may be higher than normal for 10-14 days following a steroid injection. Contact your doctor who manages your diabetes if your blood sugar is higher than usual      If you experience any of the following, call the clinic @ [phone]  - Fever over 100 degree F  - Swelling, bleeding, redness, drainage, warmth at the injection site  - New or worsening pain          [facility] Fl[facility]io [facility]    SUBJECTIVE- Interim History September 3, 2021    Chief Complaint   Patient presents with     Right Knee - RECHECK       Gerardo Valenzuela is a 61 year old female who is seen in f/u up for    Chronic pain of right knee  Acute medial meniscal injury of right knee, subsequent encounter  Primary osteoarthritis of right knee. Since last visit on 6/30/21 patient has continued to have pain in her knee.  She has been using the  brace and a cane, which she does notice a difference with.  She has continued with PT.  She has not been working due to not having light duty.   - Now ~1 year from initial injury      Interested in repeat steroid injection.    Notes that work comp has not been covering cost of care so far.    **    No longer working; no light duty available.          Worsened by: activity  Better with: rest  Treatments tried: therapy, bracing, injection  Associated symptoms:      The patient is seen by themselves.      No family history pertinent to patient's problem today.     REVIEW OF SYSTEMS:  Review of Systems   All other systems reviewed and are negative.        OBJECTIVE:  /64   LMP  (LMP Unknown)      GENERAL APPEARANCE: healthy, alert and no distress   GAIT: antalgic  SKIN: no  suspicious lesions or rashes  HEENT: Sclera clear, anicteric  CV: no lower extremity edema, good peripheral pulses  RESP: Breathing not labored  NEURO: Normal strength and tone, mentation intact and speech normal  PSYCH:  mentation appears normal and affect normal/bright      Tender right medial joint line at knee.      RADIOLOGY:  See previous notes.        Large Joint Injection/Arthocentesis: R knee joint    Date/Time: 9/3/2021 9:36 AM  Performed by: Ronen Mcmahon DO  Authorized by: Ronen Mcmahon DO     Indications:  Pain and osteoarthritis  Needle Size:  25 G  Guidance: landmark guided    Approach:  Anteromedial  Location:  Knee      Medications:  2 mL lidocaine 1 %; 40 mg triamcinolone 40 MG/ML  Outcome:  Tolerated well, no immediate complications  Procedure discussed: discussed risks, benefits, and alternatives    Consent Given by:  Patient  Timeout: timeout called immediately prior to procedure    Prep: patient was prepped and draped in usual sterile fashion

## 2021-09-03 NOTE — LETTER
9/3/2021         RE: Gerardo Valenzuela  [address] [facility]  [place] [address]        Dear Colleague,    Thank you for referring your patient, Gerardo Valenzuela, to the [facility]. Please see a copy of my visit note below.    ASSESSMENT & PLAN    Gerardo was seen today for recheck.    Diagnoses and all orders for this visit:    Chronic pain of right knee  -     Large Joint Injection/Arthocentesis: R knee joint    Acute medial meniscal injury of right knee, subsequent encounter  -     Large Joint Injection/Arthocentesis: R knee joint    Primary osteoarthritis of right knee  -     Large Joint Injection/Arthocentesis: R knee joint      This issue is chronic and persistent.  Interested in repeat steroid injection, done today.  Has  brace.  Continue HEP from PT.      See Patient Instructions  Patient Instructions   Steroid injection done today, goal of pain relief.  Continue with  bracing for comfort, but may trial out of brace if desired, if comfortable.  Keep up with home exercises from physical therapy.  Updated letter for work; continue with work restrictions (will extend 1 month), though not working currently. If improving with the injection, contact the clinic and can trial return to work without restrictions.  Future considerations may include updating imaging (start with repeat x-ray), and referring back to discuss further with orthopedic surgery.    If you have any further questions for your physician or physician s care team you can call [phone] and use option 3 to leave a voice message. Calls received during business hours will be returned same day.        Injection Discharge Instructions      You may shower, however avoid swimming, tub baths or hot tubs for 24 hours following your procedure    You may have a mild to moderate increase in pain for several days following the injection.    It may take up to 14 days for the steroid medication to  start working although you may feel the effect as early as a few days after the procedure.    You may use ice packs for 10-15 minutes, 3 to 4 times a day at the injection site for comfort    You may use anti-inflammatory medications (such as Ibuprofen or Aleve or Advil) if you are able to take safely, or acetaminophen (Tylenol) for pain control if necessary    If you were fasting, you may resume your normal diet and medications after the procedure    If you have diabetes, check your blood sugar more frequently than usual as your blood sugar may be higher than normal for 10-14 days following a steroid injection. Contact your doctor who manages your diabetes if your blood sugar is higher than usual      If you experience any of the following, call the clinic @ [phone]  - Fever over 100 degree F  - Swelling, bleeding, redness, drainage, warmth at the injection site  - New or worsening pain          [facility]    SUBJECTIVE- Interim History September 3, 2021    Chief Complaint   Patient presents with     Right Knee - RECHECK       Gerardo Valenzuela is a 61 year old female who is seen in f/u up for    Chronic pain of right knee  Acute medial meniscal injury of right knee, subsequent encounter  Primary osteoarthritis of right knee. Since last visit on 6/30/21 patient has continued to have pain in her knee.  She has been using the  brace and a cane, which she does notice a difference with.  She has continued with PT.  She has not been working due to not having light duty.   - Now ~1 year from initial injury      Interested in repeat steroid injection.    Notes that work comp has not been covering cost of care so far.    **    No longer working; no light duty available.          Worsened by: activity  Better with: rest  Treatments tried: therapy, bracing, injection  Associated symptoms:      The patient is seen by themselves.      No family history  pertinent to patient's problem today.     REVIEW OF SYSTEMS:  Review of Systems   All other systems reviewed and are negative.        OBJECTIVE:  /64   LMP  (LMP Unknown)      GENERAL APPEARANCE: healthy, alert and no distress   GAIT: antalgic  SKIN: no suspicious lesions or rashes  HEENT: Sclera clear, anicteric  CV: no lower extremity edema, good peripheral pulses  RESP: Breathing not labored  NEURO: Normal strength and tone, mentation intact and speech normal  PSYCH:  mentation appears normal and affect normal/bright      Tender right medial joint line at knee.      RADIOLOGY:  See previous notes.        Large Joint Injection/Arthocentesis: R knee joint    Date/Time: 9/3/2021 9:36 AM  Performed by: Ronen Mcmahon DO  Authorized by: Ronen Mcmahon DO     Indications:  Pain and osteoarthritis  Needle Size:  25 G  Guidance: landmark guided    Approach:  Anteromedial  Location:  Knee      Medications:  2 mL lidocaine 1 %; 40 mg triamcinolone 40 MG/ML  Outcome:  Tolerated well, no immediate complications  Procedure discussed: discussed risks, benefits, and alternatives    Consent Given by:  Patient  Timeout: timeout called immediately prior to procedure    Prep: patient was prepped and draped in usual sterile fashion                           Again, thank you for allowing me to participate in the care of your patient.        Sincerely,        Ronen Mcmahon DO

## 2021-09-12 RX ORDER — LIDOCAINE HYDROCHLORIDE 10 MG/ML
2 INJECTION, SOLUTION INFILTRATION; PERINEURAL
Status: DISCONTINUED | OUTPATIENT
Start: 2021-09-03 | End: 2021-10-18

## 2021-09-12 RX ORDER — TRIAMCINOLONE ACETONIDE 40 MG/ML
40 INJECTION, SUSPENSION INTRA-ARTICULAR; INTRAMUSCULAR
Status: DISCONTINUED | OUTPATIENT
Start: 2021-09-03 | End: 2021-10-18

## 2021-10-18 ENCOUNTER — OFFICE VISIT (OUTPATIENT)
Dept: ORTHOPEDICS | Facility: CLINIC | Age: 61
End: 2021-10-18
Payer: COMMERCIAL

## 2021-10-18 VITALS
HEIGHT: 62 IN | DIASTOLIC BLOOD PRESSURE: 64 MMHG | WEIGHT: 152 LBS | SYSTOLIC BLOOD PRESSURE: 114 MMHG | BODY MASS INDEX: 27.97 KG/M2

## 2021-10-18 DIAGNOSIS — M25.561 CHRONIC PAIN OF RIGHT KNEE: Primary | ICD-10-CM

## 2021-10-18 DIAGNOSIS — G89.29 CHRONIC PAIN OF RIGHT KNEE: Primary | ICD-10-CM

## 2021-10-18 DIAGNOSIS — S83.8X1D ACUTE MEDIAL MENISCAL INJURY OF RIGHT KNEE, SUBSEQUENT ENCOUNTER: ICD-10-CM

## 2021-10-18 DIAGNOSIS — M17.11 PRIMARY OSTEOARTHRITIS OF RIGHT KNEE: ICD-10-CM

## 2021-10-18 PROCEDURE — 99212 OFFICE O/P EST SF 10 MIN: CPT | Performed by: PEDIATRICS

## 2021-10-18 ASSESSMENT — MIFFLIN-ST. JEOR: SCORE: 1202.72

## 2021-10-18 NOTE — PROGRESS NOTES
ASSESSMENT & PLAN    Gerardo was seen today for recheck.    Diagnoses and all orders for this visit:    Chronic pain of right knee  -     Care Coordination Referral; Future  -     Occupational Medicine Referral; Future    Acute medial meniscal injury of right knee, subsequent encounter  -     Care Coordination Referral; Future  -     Occupational Medicine Referral; Future    Primary osteoarthritis of right knee  -     Care Coordination Referral; Future  -     Occupational Medicine Referral; Future      No employment, no insurance coverage.  Very challenging situation, with ongoing symptoms.  Discussed potential for repeat MRI, but cost is a factor, and may not change next steps.  Has done PT and  bracing.  Unfortunately did not get desired benefit from steroid injections. Visco is consideration; PA can be done if she is interested.  Discussed seeing occ med, with mismatch between employment demands and ongoing knee issues. Also, she has some paperwork related to possible disability; with this condition, it is treatable, so question how would qualify. Additionally, the paperwork she has appears related more to mental health, rather than physical disability. Referral placed to occ med with these issues.  Also placed care coordination referral given medical bills, and no employment and no insurance.  Future consideration may be return to see ortho surgeon for further discussion.  Otherwise, consider options for now.  Questions answered. Discussed signs and symptoms that may indicate more serious issues; the patient was instructed to seek appropriate care if noted. Patience indicates understanding of these issues and agrees with the plan.          See Patient Instructions  Patient Instructions   Focus is on the right knee arthritis.  You have had previous x-rays and MRI scan.  We have also been treating with an  brace, physical therapy, and a pad to pad steroid injections.  A couple of additional  considerations remain, including the potential to repeat imaging (likely to reveal the same findings as previous, with arthritis), trial of a Visco supplement injection (this would require prior authorization with insurance), and referral back to an orthopedic surgeon given the ongoing pain from arthritis despite past treatment.  For now, consider these options above for further care for the knee.  If desiring Visco supplement injection or referral to orthopedic surgery, you may contact the clinic.    Regarding concern about lack of insurance and financial implications of that, have placed a care coordination referral.    Finally, with the questions regarding permanent disability for knee osteoarthritis, would defer to occupational medicine.  Referral has been placed for this as well, and you may pursue if desired.    If you have any further questions for your physician or physician s care team you can call [phone] and use option 3 to leave a voice message. Calls received during business hours will be returned same day.      [facility]  [phone]    [address],  [place] [address]      [facility] [place]    SUBJECTIVE- Interim History October 18, 2021    Chief Complaint   Patient presents with     Right Knee - RECHECK       Gerardo Valenzuela is a 61 year old female who is seen in f/u up for    Chronic pain of right knee  Acute medial meniscal injury of right knee, subsequent encounter  Primary osteoarthritis of right knee. Since last visit on 9/3/21 patient has continued to have pain in her knee.  She does not feel she had any relief from the steroid injection.  Has been using the  brace and a cane.  She has continued to be off of work.    9/12/20- Now ~ 14 months from initial injury.     She has max ed out her STD, and w/c is no longer paying.  They are here to discuss completion of disability forms on the  "advice of her .     The patient is seen with their daughter.      No family history pertinent to patient's problem today.     **  Felt like after the injection at last visit, area was \"numb\" and blood sugar was elevated for ~3 days.      REVIEW OF SYSTEMS:  Review of Systems      OBJECTIVE:  /64   Ht 1.575 m (5' 2\")   Wt 68.9 kg (152 lb)   LMP  (LMP Unknown)   BMI 27.80 kg/m       GENERAL APPEARANCE: healthy, alert and no distress   Gait: antalgic    RADIOLOGY:  See previous notes.      "

## 2021-10-18 NOTE — PATIENT INSTRUCTIONS
Focus is on the right knee arthritis.  You have had previous x-rays and MRI scan.  We have also been treating with an  brace, physical therapy, and a pad to pad steroid injections.  A couple of additional considerations remain, including the potential to repeat imaging (likely to reveal the same findings as previous, with arthritis), trial of a Visco supplement injection (this would require prior authorization with insurance), and referral back to an orthopedic surgeon given the ongoing pain from arthritis despite past treatment.  For now, consider these options above for further care for the knee.  If desiring Visco supplement injection or referral to orthopedic surgery, you may contact the clinic.    Regarding concern about lack of insurance and financial implications of that, have placed a care coordination referral.    Finally, with the questions regarding permanent disability for knee osteoarthritis, would defer to occupational medicine.  Referral has been placed for this as well, and you may pursue if desired.    If you have any further questions for your physician or physician s care team you can call [phone] and use option 3 to leave a voice message. Calls received during business hours will be returned same day.      [facility]  [phone] [address],  [place] [address]

## 2021-10-18 NOTE — LETTER
10/18/2021         RE: Gerardo Valenzuela  [address] [place] [address]  [place] [address]        Dear Colleague,    Thank you for referring your patient, Gerardo Valenzuela, to the [facility]. Please see a copy of my visit note below.    ASSESSMENT & PLAN    Gerardo was seen today for recheck.    Diagnoses and all orders for this visit:    Chronic pain of right knee  -     Care Coordination Referral; Future  -     Occupational Medicine Referral; Future    Acute medial meniscal injury of right knee, subsequent encounter  -     Care Coordination Referral; Future  -     Occupational Medicine Referral; Future    Primary osteoarthritis of right knee  -     Care Coordination Referral; Future  -     Occupational Medicine Referral; Future      No employment, no insurance coverage.  Very challenging situation, with ongoing symptoms.  Discussed potential for repeat MRI, but cost is a factor, and may not change next steps.  Has done PT and  bracing.  Unfortunately did not get desired benefit from steroid injections. Visco is consideration; PA can be done if she is interested.  Discussed seeing occ med, with mismatch between employment demands and ongoing knee issues. Also, she has some paperwork related to possible disability; with this condition, it is treatable, so question how would qualify. Additionally, the paperwork she has appears related more to mental health, rather than physical disability. Referral placed to occ med with these issues.  Also placed care coordination referral given medical bills, and no employment and no insurance.  Future consideration may be return to see ortho surgeon for further discussion.  Otherwise, consider options for now.  Questions answered. Discussed signs and symptoms that may indicate more serious issues; the patient was instructed to seek appropriate care if noted. Patience indicates understanding of these issues and agrees with the  plan.          See Patient Instructions  Patient Instructions   Focus is on the right knee arthritis.  You have had previous x-rays and MRI scan.  We have also been treating with an  brace, physical therapy, and a pad to pad steroid injections.  A couple of additional considerations remain, including the potential to repeat imaging (likely to reveal the same findings as previous, with arthritis), trial of a Visco supplement injection (this would require prior authorization with insurance), and referral back to an orthopedic surgeon given the ongoing pain from arthritis despite past treatment.  For now, consider these options above for further care for the knee.  If desiring Visco supplement injection or referral to orthopedic surgery, you may contact the clinic.    Regarding concern about lack of insurance and financial implications of that, have placed a care coordination referral.    Finally, with the questions regarding permanent disability for knee osteoarthritis, would defer to occupational medicine.  Referral has been placed for this as well, and you may pursue if desired.    If you have any further questions for your physician or physician s care team you can call [phone] and use option 3 to leave a voice message. Calls received during business hours will be returned same day.      [facility]  [phone]    [address], [address],  [place] [address]      [facility]    SUBJECTIVE- Interim History October 18, 2021    Chief Complaint   Patient presents with     Right Knee - RECHECK       Gerardo Valenzuela is a 61 year old female who is seen in f/u up for    Chronic pain of right knee  Acute medial meniscal injury of right knee, subsequent encounter  Primary osteoarthritis of right knee. Since last visit on 9/3/21 patient has continued to have pain in her knee.  She does not feel she had any relief from the  "steroid injection.  Has been using the  brace and a cane.  She has continued to be off of work.    9/12/20- Now ~ 14 months from initial injury.     She has max ed out her STD, and w/c is no longer paying.  They are here to discuss completion of disability forms on the advice of her .     The patient is seen with their daughter.      No family history pertinent to patient's problem today.     **  Felt like after the injection at last visit, area was \"numb\" and blood sugar was elevated for ~3 days.      REVIEW OF SYSTEMS:  Review of Systems      OBJECTIVE:  /64   Ht 1.575 m (5' 2\")   Wt 68.9 kg (152 lb)   LMP  (LMP Unknown)   BMI 27.80 kg/m       GENERAL APPEARANCE: healthy, alert and no distress   Gait: antalgic    RADIOLOGY:  See previous notes.          Again, thank you for allowing me to participate in the care of your patient.        Sincerely,        Ronen Mcmahon,     "

## 2021-10-19 ENCOUNTER — PATIENT OUTREACH (OUTPATIENT)
Dept: CARE COORDINATION | Facility: CLINIC | Age: 61
End: 2021-10-19

## 2021-10-19 NOTE — PROGRESS NOTES
Clinic Care Coordination Contact  [facility]/Voicemail    Clinical Data: Care Coordinator Outreach  Outreach attempted x 1.  Left message on patient's voicemail with call back information and requested return call.  Plan: Care Coordinator will try to reach patient again in 1-2 business days.      ** CHW will see if patient is establishing with [facility] or staying with [facility].

## 2021-10-19 NOTE — LETTER
[facility] CARE COORDINATION    October 20, 2021    Gerardo Valenzuela  [address] [place] [id number]      Dear Gerardo,    I am a clinic community health worker who works with [facility]. I have been trying to reach you recently to introduce Clinic Care Coordination and to see if there was anything I could assist you with.  Below is a description of clinic care coordination and how I can further assist you.      The clinic care coordination team is made up of a registered nurse,  and community health worker who understand the health care system. The goal of clinic care coordination is to help you manage your health and improve access to the health care system in the most efficient manner. The team can assist you in meeting your health care goals by providing education, coordinating services, strengthening the communication among your providers and supporting you with any resource needs.    Please feel free to contact me at [phone] with any questions or concerns. We are focused on providing you with the highest-quality healthcare experience possible and that all starts with you.     Sincerely,     Radha Sullivan  Community Health Worker  [facility]  Clinic Care Coordination   Office: [phone]

## 2021-10-20 NOTE — PROGRESS NOTES
Clinic Care Coordination Contact  [facility]/Voicemail    Clinical Data: Care Coordinator Outreach  Outreach attempted x 2.  Left message on patient's voicemail with call back information and requested return call.  Plan: Care Coordinator sent care coordination introduction letter on 10/20/21 via [employer]. Care Coordinator will do no further outreaches at this time.

## 2021-10-24 ENCOUNTER — HEALTH MAINTENANCE LETTER (OUTPATIENT)
Age: 61
End: 2021-10-24

## 2021-11-18 ENCOUNTER — OFFICE VISIT (OUTPATIENT)
Dept: FAMILY MEDICINE | Facility: CLINIC | Age: 61
End: 2021-11-18
Payer: COMMERCIAL

## 2021-11-18 VITALS
TEMPERATURE: 98.3 F | WEIGHT: 152 LBS | DIASTOLIC BLOOD PRESSURE: 81 MMHG | HEIGHT: 62 IN | SYSTOLIC BLOOD PRESSURE: 138 MMHG | HEART RATE: 88 BPM | OXYGEN SATURATION: 96 % | BODY MASS INDEX: 27.97 KG/M2

## 2021-11-18 DIAGNOSIS — E55.9 VITAMIN D DEFICIENCY: ICD-10-CM

## 2021-11-18 DIAGNOSIS — N95.0 POSTMENOPAUSAL BLEEDING: ICD-10-CM

## 2021-11-18 DIAGNOSIS — Z00.00 ROUTINE GENERAL MEDICAL EXAMINATION AT A HEALTH CARE FACILITY: Primary | ICD-10-CM

## 2021-11-18 DIAGNOSIS — Z12.31 ENCOUNTER FOR SCREENING MAMMOGRAM FOR BREAST CANCER: ICD-10-CM

## 2021-11-18 DIAGNOSIS — Z12.11 SCREEN FOR COLON CANCER: ICD-10-CM

## 2021-11-18 DIAGNOSIS — R73.09 ELEVATED GLUCOSE: ICD-10-CM

## 2021-11-18 LAB
CHOLEST SERPL-MCNC: 198 MG/DL
DEPRECATED CALCIDIOL+CALCIFEROL SERPL-MC: 73 UG/L (ref 20–75)
FASTING STATUS PATIENT QL REPORTED: YES
FASTING STATUS PATIENT QL REPORTED: YES
GLUCOSE BLD-MCNC: 158 MG/DL (ref 70–99)
HDLC SERPL-MCNC: 38 MG/DL
LDLC SERPL CALC-MCNC: 115 MG/DL
NONHDLC SERPL-MCNC: 160 MG/DL
TRIGL SERPL-MCNC: 223 MG/DL

## 2021-11-18 PROCEDURE — 99396 PREV VISIT EST AGE 40-64: CPT | Mod: 25 | Performed by: NURSE PRACTITIONER

## 2021-11-18 PROCEDURE — 36415 COLL VENOUS BLD VENIPUNCTURE: CPT | Performed by: NURSE PRACTITIONER

## 2021-11-18 PROCEDURE — 82306 VITAMIN D 25 HYDROXY: CPT | Performed by: NURSE PRACTITIONER

## 2021-11-18 PROCEDURE — 82947 ASSAY GLUCOSE BLOOD QUANT: CPT | Performed by: NURSE PRACTITIONER

## 2021-11-18 PROCEDURE — 90471 IMMUNIZATION ADMIN: CPT | Performed by: NURSE PRACTITIONER

## 2021-11-18 PROCEDURE — 90472 IMMUNIZATION ADMIN EACH ADD: CPT | Performed by: NURSE PRACTITIONER

## 2021-11-18 PROCEDURE — 99213 OFFICE O/P EST LOW 20 MIN: CPT | Mod: 25 | Performed by: NURSE PRACTITIONER

## 2021-11-18 PROCEDURE — 90682 RIV4 VACC RECOMBINANT DNA IM: CPT | Performed by: NURSE PRACTITIONER

## 2021-11-18 PROCEDURE — 80061 LIPID PANEL: CPT | Performed by: NURSE PRACTITIONER

## 2021-11-18 PROCEDURE — 90715 TDAP VACCINE 7 YRS/> IM: CPT | Performed by: NURSE PRACTITIONER

## 2021-11-18 ASSESSMENT — ENCOUNTER SYMPTOMS
CHILLS: 0
ABDOMINAL PAIN: 1
FREQUENCY: 0
PARESTHESIAS: 0
SORE THROAT: 0
HEADACHES: 0
EYE PAIN: 0
MYALGIAS: 0
SHORTNESS OF BREATH: 0
HEMATURIA: 0
HEMATOCHEZIA: 0
DIZZINESS: 0
DIARRHEA: 0
CONSTIPATION: 0
ARTHRALGIAS: 1
HEARTBURN: 1
JOINT SWELLING: 1
DYSURIA: 0
PALPITATIONS: 0
FEVER: 0
NERVOUS/ANXIOUS: 0
NAUSEA: 0
WEAKNESS: 0
COUGH: 0

## 2021-11-18 ASSESSMENT — MIFFLIN-ST. JEOR: SCORE: 1202.72

## 2021-11-18 NOTE — PROGRESS NOTES
calc     SUBJECTIVE:   CC: Gerardo Valenzuela is an 61 year old woman who presents for preventive health visit.     Patient has been advised of split billing requirements and indicates understanding: Yes     Healthy Habits:     Getting at least 3 servings of Calcium per day:  Yes    Bi-annual eye exam:  NO    Dental care twice a year:  Yes    Sleep apnea or symptoms of sleep apnea:  Excessive snoring    Diet:  Regular (no restrictions)    Frequency of exercise:  6-7 days/week    Duration of exercise:  30-45 minutes    Taking medications regularly:  Yes    Medication side effects:  None    PHQ-2 Total Score: 0    Additional concerns today:  Yes    Menses stopped 8 years ago. Had recent episode of vaginal bleeding after cortisone shot in knee- this was 2 months ago. Lasted 1 week.         Today's PHQ-2 Score:   PHQ-2 ( 1999 [employer]) 11/18/2021   Q1: Little interest or pleasure in doing things 0   Q2: Feeling down, depressed or hopeless 0   PHQ-2 Score 0   Q1: Little interest or pleasure in doing things Not at all   Q2: Feeling down, depressed or hopeless Not at all   PHQ-2 Score 0       Abuse: Current or Past (Physical, Sexual or Emotional) - No  Do you feel safe in your environment? Yes    Have you ever done Advance Care Planning? (For example, a Health Directive, POLST, or a discussion with a medical provider or your loved ones about your wishes): No, advance care planning information given to patient to review.  Advanced care planning was discussed at today's visit.    Social History     Tobacco Use     Smoking status: Never Smoker     Smokeless tobacco: Never Used   Substance Use Topics     Alcohol use: No         Alcohol Use 11/18/2021   Prescreen: >3 drinks/day or >7 drinks/week? No   Prescreen: >3 drinks/day or >7 drinks/week? -       Reviewed orders with patient.  Reviewed health maintenance and updated orders accordingly - Yes  Labs reviewed in EPIC    Breast Cancer Screening:  Any new diagnosis of family  "breast, ovarian, or bowel cancer? No    FHS-7: No flowsheet data found.    Mammogram Screening: Recommended mammography every 1-2 years with patient discussion and risk factor consideration  Pertinent mammograms are reviewed under the imaging tab.    History of abnormal Pap smear: NO - age 30-65 PAP every 5 years with negative HPV co-testing recommended  PAP / HPV Latest Ref Rng & Units 9/18/2019 12/4/2017 8/15/2012   PAP (Historical) - NIL NIL NIL   HPV16 NEG:Negative Negative Negative -   HPV18 NEG:Negative Negative Negative -   HRHPV NEG:Negative Negative Negative -     Reviewed and updated as needed this visit by clinical staff  Tobacco  Allergies  Meds             Reviewed and updated as needed this visit by Provider                   Review of Systems   Constitutional: Negative for chills and fever.   HENT: Negative for congestion, ear pain, hearing loss and sore throat.    Eyes: Negative for pain and visual disturbance.   Respiratory: Negative for cough and shortness of breath.    Cardiovascular: Positive for peripheral edema. Negative for chest pain and palpitations.   Gastrointestinal: Positive for abdominal pain and heartburn. Negative for constipation, diarrhea, hematochezia and nausea.   Genitourinary: Negative for dysuria, frequency, genital sores, hematuria and urgency.   Musculoskeletal: Positive for arthralgias and joint swelling. Negative for myalgias.   Skin: Negative for rash.   Neurological: Negative for dizziness, weakness, headaches and paresthesias.   Psychiatric/Behavioral: Negative for mood changes. The patient is not nervous/anxious.           OBJECTIVE:   /81 (BP Location: Left arm, Patient Position: Sitting, Cuff Size: Adult Regular)   Pulse 88   Temp 98.3  F (36.8  C) (Oral)   Ht 1.575 m (5' 2\")   Wt 68.9 kg (152 lb)   LMP  (LMP Unknown)   SpO2 96%   BMI 27.80 kg/m    Physical Exam  GENERAL: healthy, alert and no distress  EYES: Eyes grossly normal to inspection, PERRL " and conjunctivae and sclerae normal  HENT: ear canals and TM's normal, nose and mouth without ulcers or lesions  NECK: no adenopathy, no asymmetry, masses, or scars and thyroid normal to palpation  RESP: lungs clear to auscultation - no rales, rhonchi or wheezes  BREAST: normal without masses, tenderness or nipple discharge and no palpable axillary masses or adenopathy  CV: regular rate and rhythm, normal S1 S2, no S3 or S4, no murmur, click or rub, no peripheral edema and peripheral pulses strong  ABDOMEN: soft, nontender, no hepatosplenomegaly, no masses and bowel sounds normal  MS: no gross musculoskeletal defects noted, no edema  SKIN: no suspicious lesions or rashes  NEURO: Normal strength and tone, mentation intact and speech normal  PSYCH: mentation appears normal, affect normal/bright    Diagnostic Test Results:  Labs reviewed in Epic  Results for orders placed or performed in visit on 11/18/21   Lipid panel reflex to direct LDL Fasting     Status: Abnormal   Result Value Ref Range    Cholesterol 198 <200 mg/dL    Triglycerides 223 (H) <150 mg/dL    Direct Measure HDL 38 (L) >=50 mg/dL    LDL Cholesterol Calculated 115 (H) <=100 mg/dL    Non HDL Cholesterol 160 (H) <130 mg/dL    Patient Fasting > 8hrs? Yes     Narrative    Cholesterol  Desirable:  <200 mg/dL    Triglycerides  Normal:  Less than 150 mg/dL  Borderline High:  150-199 mg/dL  High:  200-499 mg/dL  Very High:  Greater than or equal to 500 mg/dL    Direct Measure HDL  Female:  Greater than or equal to 50 mg/dL   Male:  Greater than or equal to 40 mg/dL    LDL Cholesterol  Desirable:  <100mg/dL  Above Desirable:  100-129 mg/dL   Borderline High:  130-159 mg/dL   High:  160-189 mg/dL   Very High:  >= 190 mg/dL    Non HDL Cholesterol  Desirable:  130 mg/dL  Above Desirable:  130-159 mg/dL  Borderline High:  160-189 mg/dL  High:  190-219 mg/dL  Very High:  Greater than or equal to 220 mg/dL   GLUCOSE     Status: Abnormal   Result Value Ref Range     "Glucose 158 (H) 70 - 99 mg/dL    Patient Fasting > 8hrs? Yes        ASSESSMENT/PLAN:   (Z00.00) Routine general medical examination at a health care facility  (primary encounter diagnosis)  Plan: GLUCOSE, Lipid panel reflex to direct LDL         Fasting, calcium carbonate-vitamin D (OSCAL         W/D) 500-200 MG-UNIT tablet            (N95.0) Postmenopausal bleeding  Comment: US, then follow up with Gynecology. Discussed importance of ruling out endometrial cancer  Plan: US Pelvic Complete with Transvaginal, Ob/Gyn         Referral          (Z12.11) Screen for colon cancer  Plan: Fecal colorectal cancer screen (FIT)            (Z12.31) Encounter for screening mammogram for breast cancer  Plan: MA SCREENING DIGITAL BILAT - Future  (s+30)            (E55.9) Vitamin D deficiency  Plan: calcium carbonate-vitamin D (OSCAL W/D) 500-200        MG-UNIT tablet, Vitamin D Deficiency            Patient has been advised of split billing requirements and indicates understanding: No  COUNSELING:  Reviewed preventive health counseling, as reflected in patient instructions       Regular exercise       Healthy diet/nutrition       Vision screening       Immunizations    Vaccinated for: Influenza and TDAP and Declined: Zoster, COVID-19 booster due to Concerns about side effects/safety               Osteoporosis prevention/bone health       Colon cancer screening       HIV screeninx in teen years, 1x in adult years, and at intervals if high risk       Advance Care Planning    Estimated body mass index is 27.8 kg/m  as calculated from the following:    Height as of this encounter: 1.575 m (5' 2\").    Weight as of this encounter: 68.9 kg (152 lb).    Weight management plan: Discussed healthy diet and exercise guidelines    She reports that she has never smoked. She has never used smokeless tobacco.      Counseling Resources:  ATP IV Guidelines  Pooled Cohorts Equation Calculator  Breast Cancer Risk Calculator  BRCA-Related Cancer " Risk Assessment: FHS-7 Tool  FRAX Risk Assessment  ICSI Preventive Guidelines  Dietary Guidelines for Americans, 2010  USDA's MyPlate  ASA Prophylaxis  Lung CA Screening    IVONNE Plata CNP  [facility]

## 2021-11-18 NOTE — PATIENT INSTRUCTIONS
Schedule your COVID booster in the pharmacy.  Ask in the pharmacy about the Shingrix vaccine also.      [facility] Advanced Care Planning is a free service available through [employer].    Please visit [email or web address] or call [phone] to learn about and register for classes.    If you need an , please call your clinic to arrange for an individual appointment.    For other questions email [email or web address] or call [phone].      Preventive Health Recommendations  Female Ages 50 - 64    Yearly exam: See your health care provider every year in order to  o Review health changes.   o Discuss preventive care.    o Review your medicines if your doctor has prescribed any.      Get a Pap test every three years (unless you have an abnormal result and your provider advises testing more often).    If you get Pap tests with HPV test, you only need to test every 5 years, unless you have an abnormal result.     You do not need a Pap test if your uterus was removed (hysterectomy) and you have not had cancer.    You should be tested each year for STDs (sexually transmitted diseases) if you're at risk.     Have a mammogram every 1 to 2 years.    Have a colonoscopy at age 50, or have a yearly FIT test (stool test). These exams screen for colon cancer.      Have a cholesterol test every 5 years, or more often if advised.    Have a diabetes test (fasting glucose) every three years. If you are at risk for diabetes, you should have this test more often.     If you are at risk for osteoporosis (brittle bone disease), think about having a bone density scan (DEXA).    Shots: Get a flu shot each year. Get a tetanus shot every 10 years.    Nutrition:     Eat at least 5 servings of fruits and vegetables each day.    Eat whole-grain bread, whole-wheat pasta and brown rice instead of white grains and rice.    Get adequate Calcium and Vitamin D.     Lifestyle    Exercise at least 150 minutes a week (30  minutes a day, 5 days a week). This will help you control your weight and prevent disease.    Limit alcohol to one drink per day.    No smoking.     Wear sunscreen to prevent skin cancer.     See your dentist every six months for an exam and cleaning.    See your eye doctor every 1 to 2 years.

## 2021-11-22 PROBLEM — R73.09 ELEVATED GLUCOSE: Status: ACTIVE | Noted: 2021-11-22

## 2021-12-07 ENCOUNTER — ANCILLARY PROCEDURE (OUTPATIENT)
Dept: MAMMOGRAPHY | Facility: CLINIC | Age: 61
End: 2021-12-07
Attending: NURSE PRACTITIONER
Payer: COMMERCIAL

## 2021-12-07 ENCOUNTER — ANCILLARY PROCEDURE (OUTPATIENT)
Dept: ULTRASOUND IMAGING | Facility: CLINIC | Age: 61
End: 2021-12-07
Attending: NURSE PRACTITIONER
Payer: COMMERCIAL

## 2021-12-07 DIAGNOSIS — N95.0 POSTMENOPAUSAL BLEEDING: ICD-10-CM

## 2021-12-07 DIAGNOSIS — Z12.31 ENCOUNTER FOR SCREENING MAMMOGRAM FOR BREAST CANCER: ICD-10-CM

## 2021-12-07 PROCEDURE — 76856 US EXAM PELVIC COMPLETE: CPT | Performed by: RADIOLOGY

## 2021-12-07 PROCEDURE — 76830 TRANSVAGINAL US NON-OB: CPT | Performed by: RADIOLOGY

## 2021-12-07 PROCEDURE — 77067 SCR MAMMO BI INCL CAD: CPT | Mod: TC | Performed by: RADIOLOGY

## 2021-12-15 PROCEDURE — 82274 ASSAY TEST FOR BLOOD FECAL: CPT | Performed by: NURSE PRACTITIONER

## 2021-12-16 PROBLEM — M25.561 RIGHT KNEE PAIN: Status: RESOLVED | Noted: 2020-11-13 | Resolved: 2021-12-16

## 2021-12-16 NOTE — PROGRESS NOTES
Patient did not return for further treatment and no additional progress was noted.  Please refer to the progress note and goal flowsheet completed on 8/17/2021 for discharge information.

## 2021-12-18 LAB — HEMOCCULT STL QL IA: NEGATIVE

## 2021-12-20 NOTE — RESULT ENCOUNTER NOTE
Your FIT card test results are normal. This means you are unlikely to have colon cancer. This test for low risk individuals should be repeated yearly until age 75. Request your FIT card from the clinic or at the time of an appointment before it is due next year.     Laurie Herrera MD

## 2021-12-28 ENCOUNTER — TELEPHONE (OUTPATIENT)
Dept: OBGYN | Facility: CLINIC | Age: 61
End: 2021-12-28

## 2022-03-21 ENCOUNTER — OFFICE VISIT (OUTPATIENT)
Dept: OBGYN | Facility: CLINIC | Age: 62
End: 2022-03-21
Payer: COMMERCIAL

## 2022-03-21 VITALS
SYSTOLIC BLOOD PRESSURE: 150 MMHG | WEIGHT: 147 LBS | BODY MASS INDEX: 26.89 KG/M2 | HEART RATE: 101 BPM | DIASTOLIC BLOOD PRESSURE: 79 MMHG | OXYGEN SATURATION: 98 %

## 2022-03-21 DIAGNOSIS — R93.89 THICKENED ENDOMETRIUM: ICD-10-CM

## 2022-03-21 DIAGNOSIS — N95.0 POST-MENOPAUSAL BLEEDING: Primary | ICD-10-CM

## 2022-03-21 PROCEDURE — 88305 TISSUE EXAM BY PATHOLOGIST: CPT | Performed by: PATHOLOGY

## 2022-03-21 PROCEDURE — 58100 BIOPSY OF UTERUS LINING: CPT | Performed by: OBSTETRICS & GYNECOLOGY

## 2022-03-21 PROCEDURE — 99203 OFFICE O/P NEW LOW 30 MIN: CPT | Mod: 25 | Performed by: OBSTETRICS & GYNECOLOGY

## 2022-03-21 NOTE — PROGRESS NOTES
GYN Clinic Note  Date: 3/21/2022    CC:  Postmenopausal Bleeding    HPI:  Gerardo Valenzuela is a 61 year old female  presents for evaluation of postmenopausal bleeding as a referral from [facility]. She has been postmenopausal for 10 years.    Has had two episodes of bleeding in the last year.  Cortisone shot in knee one year ago and had a week of spotting after 3-4 days following shot.      About 6 months later, got second shot.  After 3 days, went for a walk and then when she got back, saw blood again when she went to the bathroom.  Also had small clot.  Again lasted 1 week to 1 month.    N bleeding in last 1.5 months.    No odor.  No signs of or concerns about infection.    Did have one other episode of POST-MENOPAUSAL BLEEDING about 6 years ago.    Her work-up thus far for her abnormal bleeding has included:  - transvaginal ultrasound: results below    GYN HISTORY:  No LMP recorded (lmp unknown). Patient is postmenopausal.  @BMSocialFlow@     Menarche: [age]  Menopause: 10 years ago  Last Pap: 2019  History of abnormal pap: yes        Patient has following risk factors for endometrial cancer:  Unopposed estrogen exposure: No  Obesity: No. Details:  BMI 27.8  Smoking: No  Nulliparity: No  Infertility: No  Early age of menarche / late age of menopause: No  Family history of colon cancer, ovarian cancer, and type I endometrial cancer: No    OBSTETRIC HISTORY:   OB History    Para Term  AB Living   5 5 0 0 0 4   SAB IAB Ectopic Multiple Live Births   0 0 0 0 0      # Outcome Date GA Lbr Bill/2nd Weight Sex Delivery Anes PTL Lv   5 Para            4 Para            3 Para            2 Para            1 Para                  Past Medical History:   Diagnosis Date     GERD (gastroesophageal reflux disease)      Mixed hyperlipidemia 2018     Pelvic mass in female 10/1/2019    Added automatically from request for surgery [id number]     Prediabetes 2018       Past Surgical History:   Procedure  Laterality Date     HEMORRHOIDECTOMY  1981    in [place]     LAPAROSCOPIC SALPINGO-OOPHORECTOMY Bilateral 10/7/2019    Procedure: Diagnostic laparoscopy, exploratory laparotomy, Bilateral SALPINGO-OOPHORECTOMY, lysis of adhesions;  Surgeon: Prashant Sawyer MD;  Location:  OR         Family History   Problem Relation Age of Onset     Cerebrovascular Disease Mother         CVA       Current Outpatient Medications   Medication Sig Dispense Refill     acetaminophen (TYLENOL) 325 MG tablet Take 2 tablets (650 mg) by mouth every 6 hours as needed for mild pain 24 tablet 0     calcium carbonate-vitamin D (OSCAL W/D) 500-200 MG-UNIT tablet Take 1 tablet by mouth 2 times daily 180 tablet 3     ibuprofen (ADVIL/MOTRIN) 600 MG tablet Take 1 tablet (600 mg) by mouth every 6 hours as needed for moderate pain 12 tablet 0     vitamin D3 (CHOLECALCIFEROL) 2000 units (50 mcg) tablet Take 1 tablet by mouth daily         Allergies: Penicillin [penicillins]    ROS:  C: NEGATIVE for fever, chills, change in weight  I: NEGATIVE for worrisome rashes, moles or lesions  E: NEGATIVE for vision changes or irritation  E/M: NEGATIVE for ear, mouth and throat problems  R: NEGATIVE for significant cough or SOB  CV: NEGATIVE for chest pain, palpitations or peripheral edema  GI: NEGATIVE for nausea, abdominal pain, heartburn, or change in bowel habits  : NEGATIVE for frequency, dysuria, hematuria, vaginal discharge  M: NEGATIVE for significant arthralgias or myalgia  N: NEGATIVE for weakness, dizziness or paresthesias  E: NEGATIVE for temperature intolerance, skin/hair changes  P: NEGATIVE for changes in mood or affect    EXAM:  Blood pressure (!) 150/79, pulse 101, weight 66.7 kg (147 lb), SpO2 98 %, not currently breastfeeding.   BMI= Body mass index is 26.89 kg/m .  General - pleasant female in no acute distress.  Abdomen - soft, nontender, nondistended, no hepatosplenomegaly.  Pelvic - external genitalia: normal adult female without  lesions or abnormalities; BUS: within normal limits; Vagina: well rugated, no discharge, no lesions; Cervix: no lesions or CMT;   Rectovaginal - deferred.  Musculoskeletal - no gross deformities.  Neurological - normal strength, sensation, and mental status.    ------------------  US PELVIC TRANSABDOMINAL AND TRANSVAGINAL 2021 1:41 PM     CLINICAL HISTORY: Postmenopausal bleeding  TECHNIQUE: Transabdominal scans were performed. Endovaginal ultrasound  was performed to better visualize the adnexa.     COMPARISON: 2019     FINDINGS:     UTERUS: 4.4 x 6.9 x 3.3 cm. Retroverted. No fibroids.     ENDOMETRIUM: 14 mm. Thickening with several cystic spaces.     Both ovaries are obscured bowel gas. No significant free fluid.                                                                      IMPRESSION:  1.  Thickened endometrium. Given the history of postmenopausal  bleeding, tissue sampling is recommended.  2.  Obscured ovaries.     ------------------    Procedure: Endometrial biopsy.   After informed consent was obtained, the cervix was cleansed with betadyne, grasped with a tenaculum.  The pipel was inserted easily and four quadrant sampling was done.  Sample was sent for pathology.    Tenaculum removed, hemostasis achieved with simple pressure, minimal bleeding. Patient tolerated procedure well.  -------    ASSESSMENT:  Gerardo Valenzuela is a 61 year old  who presents with postmenopausal bleeding. Discussed differential diagnosis: normal endometrium, hyperplasia, malignancy     PLAN:  There are no diagnoses linked to this encounter.  Discussed postmenopausal bleeding.    Endometrial biopsy was performed today to rule out hyperplasia and malignancy.  Transvaginal ultrasound: previously done   Cervical cancer screening: ARMANDO Redmond MD

## 2022-03-23 LAB
PATH REPORT.COMMENTS IMP SPEC: NORMAL
PATH REPORT.COMMENTS IMP SPEC: NORMAL
PATH REPORT.FINAL DX SPEC: NORMAL
PATH REPORT.GROSS SPEC: NORMAL
PATH REPORT.MICROSCOPIC SPEC OTHER STN: NORMAL
PATH REPORT.RELEVANT HX SPEC: NORMAL
PHOTO IMAGE: NORMAL

## 2022-10-15 ENCOUNTER — HEALTH MAINTENANCE LETTER (OUTPATIENT)
Age: 62
End: 2022-10-15

## 2023-03-26 ENCOUNTER — HEALTH MAINTENANCE LETTER (OUTPATIENT)
Age: 63
End: 2023-03-26

## 2023-11-07 ENCOUNTER — PATIENT OUTREACH (OUTPATIENT)
Dept: CARE COORDINATION | Facility: CLINIC | Age: 63
End: 2023-11-07
Payer: COMMERCIAL

## 2023-12-05 ENCOUNTER — PATIENT OUTREACH (OUTPATIENT)
Dept: CARE COORDINATION | Facility: CLINIC | Age: 63
End: 2023-12-05
Payer: COMMERCIAL

## 2024-03-17 ENCOUNTER — HEALTH MAINTENANCE LETTER (OUTPATIENT)
Age: 64
End: 2024-03-17

## 2024-05-26 ENCOUNTER — HEALTH MAINTENANCE LETTER (OUTPATIENT)
Age: 64
End: 2024-05-26

## 2024-08-04 ENCOUNTER — HEALTH MAINTENANCE LETTER (OUTPATIENT)
Age: 64
End: 2024-08-04

## 2024-09-19 ENCOUNTER — ORDERS ONLY (AUTO-RELEASED) (OUTPATIENT)
Dept: FAMILY MEDICINE | Facility: CLINIC | Age: 64
End: 2024-09-19

## 2024-09-19 ENCOUNTER — ANCILLARY PROCEDURE (OUTPATIENT)
Dept: GENERAL RADIOLOGY | Facility: CLINIC | Age: 64
End: 2024-09-19
Attending: PHYSICIAN ASSISTANT
Payer: COMMERCIAL

## 2024-09-19 ENCOUNTER — OFFICE VISIT (OUTPATIENT)
Dept: FAMILY MEDICINE | Facility: CLINIC | Age: 64
End: 2024-09-19
Payer: COMMERCIAL

## 2024-09-19 VITALS
SYSTOLIC BLOOD PRESSURE: 146 MMHG | HEIGHT: 60 IN | TEMPERATURE: 98.2 F | HEART RATE: 103 BPM | OXYGEN SATURATION: 98 % | BODY MASS INDEX: 28.98 KG/M2 | WEIGHT: 147.6 LBS | DIASTOLIC BLOOD PRESSURE: 77 MMHG | RESPIRATION RATE: 16 BRPM

## 2024-09-19 DIAGNOSIS — Z00.00 ENCOUNTER FOR MEDICARE ANNUAL WELLNESS EXAM: Primary | ICD-10-CM

## 2024-09-19 DIAGNOSIS — Z12.11 SPECIAL SCREENING FOR MALIGNANT NEOPLASMS, COLON: ICD-10-CM

## 2024-09-19 DIAGNOSIS — E78.2 MIXED HYPERLIPIDEMIA: ICD-10-CM

## 2024-09-19 DIAGNOSIS — M25.562 ACUTE PAIN OF BOTH KNEES: ICD-10-CM

## 2024-09-19 DIAGNOSIS — E55.9 HYPOVITAMINOSIS D: ICD-10-CM

## 2024-09-19 DIAGNOSIS — H93.13 TINNITUS, BILATERAL: ICD-10-CM

## 2024-09-19 DIAGNOSIS — M25.561 ACUTE PAIN OF BOTH KNEES: ICD-10-CM

## 2024-09-19 DIAGNOSIS — R03.0 ELEVATED BLOOD PRESSURE READING WITHOUT DIAGNOSIS OF HYPERTENSION: ICD-10-CM

## 2024-09-19 DIAGNOSIS — Z78.0 ASYMPTOMATIC MENOPAUSAL STATE: ICD-10-CM

## 2024-09-19 DIAGNOSIS — E11.9 TYPE 2 DIABETES MELLITUS WITHOUT COMPLICATION, WITHOUT LONG-TERM CURRENT USE OF INSULIN (H): ICD-10-CM

## 2024-09-19 DIAGNOSIS — R73.01 ELEVATED FASTING GLUCOSE: ICD-10-CM

## 2024-09-19 DIAGNOSIS — H69.91 DYSFUNCTION OF RIGHT EUSTACHIAN TUBE: ICD-10-CM

## 2024-09-19 DIAGNOSIS — Z12.31 ENCOUNTER FOR SCREENING MAMMOGRAM FOR BREAST CANCER: ICD-10-CM

## 2024-09-19 LAB
ERYTHROCYTE [DISTWIDTH] IN BLOOD BY AUTOMATED COUNT: 13.3 % (ref 10–15)
EST. AVERAGE GLUCOSE BLD GHB EST-MCNC: 143 MG/DL
HBA1C MFR BLD: 6.6 % (ref 0–5.6)
HCT VFR BLD AUTO: 42 % (ref 35–47)
HGB BLD-MCNC: 14 G/DL (ref 11.7–15.7)
MCH RBC QN AUTO: 30 PG (ref 26.5–33)
MCHC RBC AUTO-ENTMCNC: 33.3 G/DL (ref 31.5–36.5)
MCV RBC AUTO: 90 FL (ref 78–100)
PLATELET # BLD AUTO: 313 10E3/UL (ref 150–450)
RBC # BLD AUTO: 4.67 10E6/UL (ref 3.8–5.2)
WBC # BLD AUTO: 5.9 10E3/UL (ref 4–11)

## 2024-09-19 PROCEDURE — 36415 COLL VENOUS BLD VENIPUNCTURE: CPT | Performed by: PHYSICIAN ASSISTANT

## 2024-09-19 PROCEDURE — 82306 VITAMIN D 25 HYDROXY: CPT | Performed by: PHYSICIAN ASSISTANT

## 2024-09-19 PROCEDURE — 99214 OFFICE O/P EST MOD 30 MIN: CPT | Mod: 25 | Performed by: PHYSICIAN ASSISTANT

## 2024-09-19 PROCEDURE — 73562 X-RAY EXAM OF KNEE 3: CPT | Mod: TC | Performed by: STUDENT IN AN ORGANIZED HEALTH CARE EDUCATION/TRAINING PROGRAM

## 2024-09-19 PROCEDURE — 80061 LIPID PANEL: CPT | Performed by: PHYSICIAN ASSISTANT

## 2024-09-19 PROCEDURE — 83036 HEMOGLOBIN GLYCOSYLATED A1C: CPT | Performed by: PHYSICIAN ASSISTANT

## 2024-09-19 PROCEDURE — 99397 PER PM REEVAL EST PAT 65+ YR: CPT | Performed by: PHYSICIAN ASSISTANT

## 2024-09-19 PROCEDURE — 80048 BASIC METABOLIC PNL TOTAL CA: CPT | Performed by: PHYSICIAN ASSISTANT

## 2024-09-19 PROCEDURE — 85027 COMPLETE CBC AUTOMATED: CPT | Performed by: PHYSICIAN ASSISTANT

## 2024-09-19 RX ORDER — FLUTICASONE PROPIONATE 50 MCG
1 SPRAY, SUSPENSION (ML) NASAL DAILY
Qty: 16 G | Refills: 1 | Status: SHIPPED | OUTPATIENT
Start: 2024-09-19

## 2024-09-19 NOTE — PATIENT INSTRUCTIONS
Patient Education   Preventive Care Advice   This is general advice given by our system to help you stay healthy. However, your care team may have specific advice just for you. Please talk to your care team about your preventive care needs.  Nutrition  Eat 5 or more servings of fruits and vegetables each day.  Try wheat bread, brown rice and whole grain pasta (instead of white bread, rice, and pasta).  Get enough calcium and vitamin D. Check the label on foods and aim for 100% of the RDA (recommended daily allowance).  Lifestyle  Exercise at least 150 minutes each week  (30 minutes a day, 5 days a week).  Do muscle strengthening activities 2 days a week. These help control your weight and prevent disease.  No smoking.  Wear sunscreen to prevent skin cancer.  Have a dental exam and cleaning every 6 months.  Yearly exams  See your health care team every year to talk about:  Any changes in your health.  Any medicines your care team has prescribed.  Preventive care, family planning, and ways to prevent chronic diseases.  Shots (vaccines)   HPV shots (up to age 26), if you've never had them before.  Hepatitis B shots (up to age 59), if you've never had them before.  COVID-19 shot: Get this shot when it's due.  Flu shot: Get a flu shot every year.  Tetanus shot: Get a tetanus shot every 10 years.  Pneumococcal, hepatitis A, and RSV shots: Ask your care team if you need these based on your risk.  Shingles shot (for age 50 and up)  General health tests  Diabetes screening:  Starting at age 35, Get screened for diabetes at least every 3 years.  If you are younger than age 35, ask your care team if you should be screened for diabetes.  Cholesterol test: At age 39, start having a cholesterol test every 5 years, or more often if advised.  Bone density scan (DEXA): At age 50, ask your care team if you should have this scan for osteoporosis (brittle bones).  Hepatitis C: Get tested at least once in your life.  STIs (sexually  transmitted infections)  Before age 24: Ask your care team if you should be screened for STIs.  After age 24: Get screened for STIs if you're at risk. You are at risk for STIs (including HIV) if:  You are sexually active with more than one person.  You don't use condoms every time.  You or a partner was diagnosed with a sexually transmitted infection.  If you are at risk for HIV, ask about PrEP medicine to prevent HIV.  Get tested for HIV at least once in your life, whether you are at risk for HIV or not.  Cancer screening tests  Cervical cancer screening: If you have a cervix, begin getting regular cervical cancer screening tests starting at age 21.  Breast cancer scan (mammogram): If you've ever had breasts, begin having regular mammograms starting at age 40. This is a scan to check for breast cancer.  Colon cancer screening: It is important to start screening for colon cancer at age 45.  Have a colonoscopy test every 10 years (or more often if you're at risk) Or, ask your provider about stool tests like a FIT test every year or Cologuard test every 3 years.  To learn more about your testing options, visit:   .  For help making a decision, visit:   https://bit.ly/bp17818.  Prostate cancer screening test: If you have a prostate, ask your care team if a prostate cancer screening test (PSA) at age 55 is right for you.  Lung cancer screening: If you are a current or former smoker ages 50 to 80, ask your care team if ongoing lung cancer screenings are right for you.  For informational purposes only. Not to replace the advice of your health care provider. Copyright   2023 [facility]. All rights reserved. Clinically reviewed by the [facility] Transitions Program. [employer] [id number] - REV 01/24.  Preventing Falls: Care Instructions  Injuries and health problems such as trouble walking or poor eyesight can increase your risk of falling. So can some medicines. But there are things you can do to help  "prevent falls. You can exercise to get stronger. You can also arrange your home to make it safer.    Talk to your doctor about the medicines you take. Ask if any of them increase the risk of falls and whether they can be changed or stopped.   Try to exercise regularly. It can help improve your strength and balance. This can help lower your risk of falling.     Practice fall safety and prevention.    Wear low-heeled shoes that fit well and give your feet good support. Talk to your doctor if you have foot problems that make this hard.  Carry a cellphone or wear a medical alert device that you can use to call for help.  Use stepladders instead of chairs to reach high objects. Don't climb if you're at risk for falls. Ask for help, if needed.  Wear the correct eyeglasses, if you need them.    Make your home safer.    Remove rugs, cords, clutter, and furniture from walkways.  Keep your house well lit. Use night-lights in hallways and bathrooms.  Install and use sturdy handrails on stairways.  Wear nonskid footwear, even inside. Don't walk barefoot or in socks without shoes.    Be safe outside.    Use handrails, curb cuts, and ramps whenever possible.  Keep your hands free by using a shoulder bag or backpack.  Try to walk in well-lit areas. Watch out for uneven ground, changes in pavement, and debris.  Be careful in the winter. Walk on the grass or gravel when sidewalks are slippery. Use de-icer on steps and walkways. Add non-slip devices to shoes.    Put grab bars and nonskid mats in your shower or tub and near the toilet. Try to use a shower chair or bath bench when bathing.   Get into a tub or shower by putting in your weaker leg first. Get out with your strong side first. Have a phone or medical alert device in the bathroom with you.   Where can you learn more?  Go to https://www.[employer].net/patiented  Enter G117 in the search box to learn more about \"Preventing Falls: Care Instructions.\"  Current as of: July 17, " 2023               Content Version: 14.0    7210-0323 [employer].   Care instructions adapted under license by your healthcare professional. If you have questions about a medical condition or this instruction, always ask your healthcare professional. [employer] disclaims any warranty or liability for your use of this information.

## 2024-09-19 NOTE — PROGRESS NOTES
Preventive Care Visit  [facility] REANNA Romero PA-C, Family Medicine  Sep 19, 2024      Assessment & Plan     Encounter for Medicare annual wellness exam  Updating preventive health screening today. Declined influenza and COVID vaccines. Will get her pneumonia and shingles vaccines at her pharmacy.     Acute pain of both knees  Radiographs pending. Discussed potential corticosteroid injections, physical therapy or referral to orthopedics pending the results.   - XR Knee Bilateral 3 Views; Future    Elevated blood pressure reading without diagnosis of hypertension  We will continue to monitor this as she reports blood pressure readings within normal limits at home consistently. Labs pending.   - Basic metabolic panel  (Ca, Cl, CO2, Creat, Gluc, K, Na, BUN); Future  - CBC with platelets; Future  - Basic metabolic panel  (Ca, Cl, CO2, Creat, Gluc, K, Na, BUN)  - CBC with platelets    Mixed hyperlipidemia  Labs pending. Not currently on a statin.   - Lipid panel reflex to direct LDL Non-fasting; Future  - Lipid panel reflex to direct LDL Non-fasting    Tinnitus, bilateral  See below.     Dysfunction of right eustachian tube  Recommend Flonase once daily to decrease fluid and symptoms. If no improvement in 4-6 weeks, she will reach out for further evaluation.   - fluticasone (FLONASE) 50 MCG/ACT nasal spray; Spray 1 spray into both nostrils daily.    Elevated fasting glucose  Hemoglobin A1c pending.   - Hemoglobin A1c; Future  - Hemoglobin A1c    Asymptomatic menopausal state  DEXA scan ordered.   - DEXA HIP/PELVIS/SPINE - Future; Future    Special screening for malignant neoplasms, colon  Prefers stool testing over colonoscopy. Cologuard ordered.   - COLOGUARD([employer]); Future    Encounter for screening mammogram for breast cancer  Mammogram scheduled today.  - MA Screen Bilateral w/Jose Luis; Future            BMI  Estimated body mass index is 29.29 kg/m  as calculated from the following:    " Height as of this encounter: 1.512 m (4' 11.53\").    Weight as of this encounter: 67 kg (147 lb 9.6 oz).   Weight management plan: Discussed healthy diet and exercise guidelines    Counseling  Appropriate preventive services were addressed with this patient via screening, questionnaire, or discussion as appropriate for fall prevention, nutrition, physical activity, Tobacco-use cessation, social engagement, weight loss and cognition.  Checklist reviewing preventive services available has been given to the patient.  Reviewed patient's diet, addressing concerns and/or questions.   She is at risk for lack of exercise and has been provided with information to increase physical activity for the benefit of her well-being.           Landy Morin is a 64 year old, presenting for the following:  Wellness Visit        9/19/2024     2:33 PM   Additional Questions   Roomed by An V.         9/19/2024     2:33 PM   Patient Reported Additional Medications   Patient reports taking the following new medications none        Via the Health Maintenance questionnaire, the patient has reported the following services have been completed -Mammogram: [facility] 2022-01-01, this information has been sent to the abstraction team.  Health Care Directive  Patient does not have a Health Care Directive or Living Will: Discussed advance care planning with patient; information given to patient to review.    HPI  Knee pain - right knee has been painful for 3-4 years. Feels like it is going to give out. Left knee has been painful for ~8 months after she fell down a few stairs. It was okay initially, but has become more sore. Has not had this evaluated. Attempted remedies include ibuprofen, Tylenol, ice, heat. Has done imaging and therapy for the right. Concerned about left today.     She does check her blood pressure at home. BP at home ranges in 110-120s/60-70s.     Checks blood sugar at home. Occasionally 120 when she wakes up in the morning. " Highest was 154. Trying to eat healthy. Enjoys walking and doing at home exercises.     Heartburn and nausea after eating ice cream or drinking milk. Avoids these things. Also heartburn with spicy foods. Occasionally takes antacids. Concerned about lactose intolerance.     Ringing in her bilateral ears on and off. Feels like there is water in her right ear. Popping sensation in the right ear when she swallows. Feels like hearing is ok.                9/19/2024   General Health   How would you rate your overall physical health? Good   Feel stress (tense, anxious, or unable to sleep) Not at all            9/19/2024   Nutrition   Diet: Regular (no restrictions)            9/19/2024   Exercise   Days per week of moderate/strenous exercise 3 days            9/19/2024   Social Factors   Frequency of gathering with friends or relatives More than three times a week   Worry food won't last until get money to buy more No   Food not last or not have enough money for food? No   Do you have housing? (Housing is defined as stable permanent housing and does not include staying ouside in a car, in a tent, in an abandoned building, in an overnight shelter, or couch-surfing.) Yes   Are you worried about losing your housing? No   Lack of transportation? No   Unable to get utilities (heat,electricity)? No            9/19/2024   Fall Risk   Fallen 2 or more times in the past year? No    Yes   Trouble with walking or balance? Yes    Yes   Gait Speed Test (Document in seconds) 5.59   Gait Speed Test Interpretation Greater than 5.01 seconds - ABNORMAL       Multiple values from one day are sorted in reverse-chronological order    Trouble with walking/balance due to knee pain.         9/19/2024   Activities of Daily Living- Home Safety   Needs help with the following daily activites None of the above   Safety concerns in the home None of the above            9/19/2024   Dental   Dentist two times every year? Yes            9/19/2024    Hearing Screening   Hearing concerns? None of the above            9/19/2024   Driving Risk Screening   Patient/family members have concerns about driving No            9/19/2024   General Alertness/Fatigue Screening   Have you been more tired than usual lately? No            9/19/2024   Urinary Incontinence Screening   Bothered by leaking urine in past 6 months No            9/19/2024   TB Screening   Were you born outside of the [place]? Yes            Today's PHQ-2 Score:       9/19/2024     2:26 PM   PHQ-2 ( 1999 Pfizer)   Q1: Little interest or pleasure in doing things 0   Q2: Feeling down, depressed or hopeless 0   PHQ-2 Score 0   Q1: Little interest or pleasure in doing things Not at all   Q2: Feeling down, depressed or hopeless Not at all   PHQ-2 Score 0           9/19/2024   Substance Use   Alcohol more than 3/day or more than 7/wk No   Do you have a current opioid prescription? No   How severe/bad is pain from 1 to 10? 3/10   Do you use any other substances recreationally? No        Social History     Tobacco Use    Smoking status: Never     Passive exposure: Never    Smokeless tobacco: Never   Vaping Use    Vaping status: Never Used   Substance Use Topics    Alcohol use: No    Drug use: No           12/7/2021   LAST FHS-7 RESULTS   1st degree relative breast or ovarian cancer No   Any relative bilateral breast cancer No   Any male have breast cancer No   Any ONE woman have BOTH breast AND ovarian cancer No   Any woman with breast cancer before 50yrs No   2 or more relatives with breast AND/OR ovarian cancer No   2 or more relatives with breast AND/OR bowel cancer No           Mammogram Screening - Mammogram every 1-2 years updated in Health Maintenance based on mutual decision making. Discussed yearly screening.       History of abnormal Pap smear: No - age 65 or older with adequate negative prior screening test results (3 consecutive negative cytology results, 2 consecutive negative cotesting results, or 2  consecutive negative HrHPV test results within 10 years, with the most recent test occurring within the recommended screening interval for the test used)        Latest Ref Rng & Units 9/18/2019     1:42 PM 9/18/2019     1:10 PM 12/4/2017     1:24 PM   PAP / HPV   PAP (Historical)  NIL      HPV 16 DNA NEG^Negative  Negative  Negative    HPV 18 DNA NEG^Negative  Negative  Negative    Other HR HPV NEG^Negative  Negative  Negative      ASCVD Risk   The 10-year ASCVD risk score (Melony CHICAS, et al., 2019) is: 11.6%    Values used to calculate the score:      Age: 64 years      Sex: Female      Is Non- : Yes      Diabetic: No      Tobacco smoker: No      Systolic Blood Pressure: 146 mmHg      Is BP treated: No      HDL Cholesterol: 38 mg/dL      Total Cholesterol: 198 mg/dL            Reviewed and updated as needed this visit by Provider                      Current providers sharing in care for this patient include:  Patient Care Team:  No Ref-Primary, Physician as PCP - Krystyna Cardenas MD as Referring Physician (OB/Gyn)  Prashant Sawyer MD as MD (Maternal & Fetal Medicine)  [facility] as Assigned PCP    The following health maintenance items are reviewed in Epic and correct as of today:  Health Maintenance   Topic Date Due    DEXA  Never done    ZOSTER IMMUNIZATION (1 of 2) Never done    LIPID  11/18/2022    ANNUAL REVIEW OF HM ORDERS  11/18/2022    COLORECTAL CANCER SCREENING  12/15/2022    MAMMO SCREENING  12/07/2023    MEDICARE ANNUAL WELLNESS VISIT  06/18/2024    INFLUENZA VACCINE (1) 09/01/2024    COVID-19 Vaccine (3 - 2024-25 season) 09/01/2024    Pneumococcal Vaccine: 65+ Years (1 of 1 - PCV) 06/18/2024    GLUCOSE  11/18/2024    FALL RISK ASSESSMENT  09/19/2025    ADVANCE CARE PLANNING  11/18/2026    DTAP/TDAP/TD IMMUNIZATION (2 - Td or Tdap) 11/18/2031    RSV VACCINE (1 - 1-dose 75+ series) 06/18/2034    HEPATITIS C SCREENING   "Completed    HIV SCREENING  Completed    PHQ-2 (once per calendar year)  Completed    HPV IMMUNIZATION  Aged Out    MENINGITIS IMMUNIZATION  Aged Out    RSV MONOCLONAL ANTIBODY  Aged Out    PAP  Discontinued         Review of Systems  Constitutional, HEENT, cardiovascular, pulmonary, GI, , musculoskeletal, neuro, skin, endocrine and psych systems are negative, except as otherwise noted.     Objective    Exam  BP (!) 146/77   Pulse 103   Temp 98.2  F (36.8  C) (Temporal)   Resp 16   Ht 1.512 m (4' 11.53\")   Wt 67 kg (147 lb 9.6 oz)   LMP  (LMP Unknown)   SpO2 98%   BMI 29.29 kg/m     Estimated body mass index is 29.29 kg/m  as calculated from the following:    Height as of this encounter: 1.512 m (4' 11.53\").    Weight as of this encounter: 67 kg (147 lb 9.6 oz).    Physical Exam  Constitutional:       General: She is not in acute distress.     Appearance: Normal appearance.   HENT:      Head: Normocephalic and atraumatic.      Right Ear: Hearing, ear canal and external ear normal. There is no impacted cerumen. Tympanic membrane is not perforated.      Left Ear: Hearing, ear canal and external ear normal. There is no impacted cerumen. Tympanic membrane is not perforated.      Ears:      Comments: Trace fluid behind bilateral TM     Mouth/Throat:      Mouth: Mucous membranes are moist.      Pharynx: Oropharynx is clear.   Eyes:      Extraocular Movements: Extraocular movements intact.      Conjunctiva/sclera: Conjunctivae normal.      Pupils: Pupils are equal, round, and reactive to light.   Cardiovascular:      Rate and Rhythm: Regular rhythm. Tachycardia present.      Heart sounds: Normal heart sounds. No murmur heard.  Pulmonary:      Effort: Pulmonary effort is normal.      Breath sounds: Normal breath sounds. No wheezing.   Abdominal:      General: Bowel sounds are normal.      Palpations: Abdomen is soft. There is no mass.      Tenderness: There is no abdominal tenderness.   Musculoskeletal:      " Cervical back: Neck supple. No tenderness.      Comments: Full bilateral knee ROM. Tenderness along medial and lateral left joint line. Mild edema medial to patella.    Lymphadenopathy:      Cervical: No cervical adenopathy.   Skin:     General: Skin is warm and dry.   Neurological:      General: No focal deficit present.      Mental Status: She is alert and oriented to person, place, and time.   Psychiatric:         Mood and Affect: Mood normal.         Behavior: Behavior normal.       Bilateral knee xray  Right: Mild degenerative arthritis of the patellofemoral compartment.  No fracture or joint effusion. Small superior patellar spur.     Left: Moderate medial and mild patellofemoral compartment degenerative  arthritis. No fracture or joint effusion. Small small superior  patellar spur.            9/19/2024   Mini Cog   Clock Draw Score 0 Abnormal   3 Item Recall 3 objects recalled   Mini Cog Total Score 3            DARA Rodriguez2     Signed Electronically by: Krissy Romero PA-C

## 2024-09-20 ENCOUNTER — MYC MEDICAL ADVICE (OUTPATIENT)
Dept: FAMILY MEDICINE | Facility: CLINIC | Age: 64
End: 2024-09-20
Payer: COMMERCIAL

## 2024-09-20 PROBLEM — E11.9 DIABETES MELLITUS, TYPE 2 (H): Status: ACTIVE | Noted: 2024-09-20

## 2024-09-20 LAB
ANION GAP SERPL CALCULATED.3IONS-SCNC: 12 MMOL/L (ref 7–15)
BUN SERPL-MCNC: 11.5 MG/DL (ref 8–23)
CALCIUM SERPL-MCNC: 9.8 MG/DL (ref 8.8–10.4)
CHLORIDE SERPL-SCNC: 104 MMOL/L (ref 98–107)
CHOLEST SERPL-MCNC: 248 MG/DL
CREAT SERPL-MCNC: 0.56 MG/DL (ref 0.51–0.95)
EGFRCR SERPLBLD CKD-EPI 2021: >90 ML/MIN/1.73M2
FASTING STATUS PATIENT QL REPORTED: NO
FASTING STATUS PATIENT QL REPORTED: NO
GLUCOSE SERPL-MCNC: 129 MG/DL (ref 70–99)
HCO3 SERPL-SCNC: 24 MMOL/L (ref 22–29)
HDLC SERPL-MCNC: 41 MG/DL
LDLC SERPL CALC-MCNC: 159 MG/DL
NONHDLC SERPL-MCNC: 207 MG/DL
POTASSIUM SERPL-SCNC: 4.1 MMOL/L (ref 3.4–5.3)
SODIUM SERPL-SCNC: 140 MMOL/L (ref 135–145)
TRIGL SERPL-MCNC: 241 MG/DL
VIT D+METAB SERPL-MCNC: 36 NG/ML (ref 20–50)

## 2024-09-20 RX ORDER — METFORMIN HCL 500 MG
500 TABLET, EXTENDED RELEASE 24 HR ORAL
Qty: 90 TABLET | Refills: 1 | Status: SHIPPED | OUTPATIENT
Start: 2024-09-20

## 2024-09-20 RX ORDER — ATORVASTATIN CALCIUM 20 MG/1
20 TABLET, FILM COATED ORAL DAILY
Qty: 90 TABLET | Refills: 3 | Status: SHIPPED | OUTPATIENT
Start: 2024-09-20

## 2024-10-02 ENCOUNTER — ANCILLARY PROCEDURE (OUTPATIENT)
Dept: MAMMOGRAPHY | Facility: CLINIC | Age: 64
End: 2024-10-02
Attending: PHYSICIAN ASSISTANT
Payer: COMMERCIAL

## 2024-10-02 DIAGNOSIS — Z12.31 ENCOUNTER FOR SCREENING MAMMOGRAM FOR BREAST CANCER: ICD-10-CM

## 2024-10-02 PROCEDURE — 77063 BREAST TOMOSYNTHESIS BI: CPT | Mod: TC | Performed by: RADIOLOGY

## 2024-10-02 PROCEDURE — 77067 SCR MAMMO BI INCL CAD: CPT | Mod: TC | Performed by: RADIOLOGY

## 2024-10-07 ENCOUNTER — TRANSFERRED RECORDS (OUTPATIENT)
Dept: HEALTH INFORMATION MANAGEMENT | Facility: CLINIC | Age: 64
End: 2024-10-07
Payer: COMMERCIAL

## 2024-12-09 NOTE — PATIENT INSTRUCTIONS
Glad to see the knee continues to improve  Updated work letter today; will continue with restrictions one more week. After that, plan to return to work unrestricted, unless you have any concerns.  If doing very well in one week, and continuing to feel better, then follow up is as needed. Otherwise, contact the clinic if any questions/concerns.      If you have any further questions for your physician or physician s care team you can call [phone] and use option 3 to leave a voice message. Calls received during business hours will be returned same day.     Detail Level: Detailed Add 98187 Cpt? (Important Note: In 2017 The Use Of 75886 Is Being Tracked By Cms To Determine Future Global Period Reimbursement For Global Periods): no Suture Removal Completed By (Optional): Estela

## 2024-12-21 ENCOUNTER — HEALTH MAINTENANCE LETTER (OUTPATIENT)
Age: 64
End: 2024-12-21

## 2024-12-31 ENCOUNTER — TRANSFERRED RECORDS (OUTPATIENT)
Dept: HEALTH INFORMATION MANAGEMENT | Facility: CLINIC | Age: 64
End: 2024-12-31
Payer: COMMERCIAL

## 2025-02-18 ENCOUNTER — TELEPHONE (OUTPATIENT)
Dept: FAMILY MEDICINE | Facility: CLINIC | Age: 65
End: 2025-02-18
Payer: COMMERCIAL

## 2025-02-18 NOTE — TELEPHONE ENCOUNTER
Patient Quality Outreach    Patient is due for the following:   Diabetes -  A1C, Microalbumin, BP Check, Diabetic Follow-Up Visit, and Foot Exam  Colon Cancer Screening      Topic Date Due    Pneumococcal Vaccine (1 of 2 - PCV) Never done    Zoster (Shingles) Vaccine (1 of 2) Never done    Flu Vaccine (1) 09/01/2024    COVID-19 Vaccine (3 - 2024-25 season) 09/01/2024       Action(s) Taken:   Patient has upcoming appointment, these items will be addressed at that time.    Type of outreach:    Chart review performed, no outreach needed.    Questions for provider review:    None           An Paz, [facility]  Chart routed to none.

## 2025-03-22 ENCOUNTER — HEALTH MAINTENANCE LETTER (OUTPATIENT)
Age: 65
End: 2025-03-22

## 2025-06-03 DIAGNOSIS — E11.9 TYPE 2 DIABETES MELLITUS WITHOUT COMPLICATION, WITHOUT LONG-TERM CURRENT USE OF INSULIN (H): ICD-10-CM

## 2025-06-03 RX ORDER — METFORMIN HYDROCHLORIDE 500 MG/1
500 TABLET, EXTENDED RELEASE ORAL
Qty: 90 TABLET | Refills: 0 | Status: SHIPPED | OUTPATIENT
Start: 2025-06-03

## 2025-06-04 ENCOUNTER — PATIENT OUTREACH (OUTPATIENT)
Dept: CARE COORDINATION | Facility: CLINIC | Age: 65
End: 2025-06-04
Payer: COMMERCIAL

## 2025-06-04 PROBLEM — R73.03 PREDIABETES: Status: RESOLVED | Noted: 2018-08-20 | Resolved: 2025-06-04

## 2025-06-04 PROBLEM — Z90.722 S/P BILATERAL SALPINGO-OOPHORECTOMY: Status: RESOLVED | Noted: 2019-10-29 | Resolved: 2025-06-04

## 2025-06-04 PROBLEM — R19.00 PELVIC MASS IN FEMALE: Status: RESOLVED | Noted: 2019-10-01 | Resolved: 2025-06-04

## 2025-06-04 PROBLEM — R73.09 ELEVATED GLUCOSE: Status: RESOLVED | Noted: 2021-11-22 | Resolved: 2025-06-04

## 2025-07-05 ENCOUNTER — HEALTH MAINTENANCE LETTER (OUTPATIENT)
Age: 65
End: 2025-07-05

## 2025-08-20 ENCOUNTER — PATIENT OUTREACH (OUTPATIENT)
Dept: CARE COORDINATION | Facility: CLINIC | Age: 65
End: 2025-08-20
Payer: COMMERCIAL

## 2025-09-03 ENCOUNTER — PATIENT OUTREACH (OUTPATIENT)
Dept: CARE COORDINATION | Facility: CLINIC | Age: 65
End: 2025-09-03
Payer: COMMERCIAL

## (undated) DEVICE — GLOVE PROTEXIS MICRO 7.5  2D73PM75

## (undated) DEVICE — LINEN TOWEL PACK X6 WHITE 5487

## (undated) DEVICE — CLIP HORIZON LG ORANGE 004200

## (undated) DEVICE — Device

## (undated) DEVICE — KIT PATIENT POSITIONING PIGAZZI LATEX FREE 40580

## (undated) DEVICE — PREP TECHNI-CARE CHLOROXYLENOL 3% 4OZ BOTTLE C222-4ZWO

## (undated) DEVICE — SU VICRYL 3-0 SH 27" UND J416H

## (undated) DEVICE — SU VICRYL 0 CT-1 CR 8X18" J740D

## (undated) DEVICE — SYR BULB IRRIG 50ML LATEX FREE 0035280

## (undated) DEVICE — ENDO TROCAR FIRST ENTRY KII FIOS ADV FIX 05X100MM CFF03

## (undated) DEVICE — ESU LIGASURE LAPAROSCOPIC BLUNT TIP SEALER 5MMX37CM LF1837

## (undated) DEVICE — SU MONOCRYL 4-0 PS-2 27" UND Y426H

## (undated) DEVICE — DRSG PRIMAPORE 02X3" 7133

## (undated) DEVICE — SUCTION TIP YANKAUER W/O VENT K86

## (undated) DEVICE — ESU GROUND PAD ADULT W/CORD E7507

## (undated) DEVICE — SOL NACL 0.9% INJ 1000ML BAG 2B1324X

## (undated) DEVICE — PROTECTOR ARM ONE-STEP TRENDELENBURG 40418

## (undated) DEVICE — SPONGE KITTNER 30-101

## (undated) DEVICE — SU VICRYL 0 UR-6 27" J603H

## (undated) DEVICE — ESU ELEC BLADE 6" COATED/INSULATED E1455-6

## (undated) DEVICE — SU MONOCRYL 3-0 PS-1 27" Y936H

## (undated) DEVICE — ENDO TROCAR SLEEVE KII ADV FIXATION 05X100MM CFS02

## (undated) DEVICE — ENDO SCOPE WARMER SEAL  C3101

## (undated) DEVICE — SPONGE LAP 18X18" X8435

## (undated) DEVICE — SPECIMEN TRAP MUCOUS 40ML LUKI C30200A

## (undated) DEVICE — ESU ENDO SCISSORS 5MM CVD 5DCS

## (undated) DEVICE — JELLY LUBRICATING SURGILUBE 2OZ TUBE

## (undated) DEVICE — SU PDS II 0 TP-1 60" Z991G

## (undated) DEVICE — PREP CHLORAPREP 26ML TINTED ORANGE  260815

## (undated) DEVICE — LINEN TOWEL PACK X30 5481

## (undated) DEVICE — SUCTION TIP POOLE K770

## (undated) DEVICE — GLOVE PROTEXIS BLUE W/NEU-THERA 8.0  2D73EB80

## (undated) DEVICE — CLIP HORIZON MED BLUE 002200

## (undated) DEVICE — TUBING SUCTION 10'X3/16" N510

## (undated) DEVICE — SU VICRYL 0 TIE 54" J608H

## (undated) DEVICE — WIPES FOLEY CARE SURESTEP PROVON DFC100

## (undated) DEVICE — SOL NACL 0.9% IRRIG 1000ML BOTTLE 2F7124

## (undated) DEVICE — SUCTION IRR STRYKERFLOW II W/TIP 250-070-520

## (undated) DEVICE — DRSG MEDIPORE 3 1/2X13 3/4" 3573

## (undated) DEVICE — ESU PENCIL W/COATED BLADE E2450H

## (undated) DEVICE — SOL WATER IRRIG 1000ML BOTTLE 2F7114

## (undated) DEVICE — SU SILK 3-0 SH CR 8X18" C013D

## (undated) DEVICE — BLADE CLIPPER SGL USE 9680

## (undated) DEVICE — ENDO TROCAR BLUNT TIP KII BALLOON 12X100MM C0R47

## (undated) RX ORDER — FENTANYL CITRATE 50 UG/ML
INJECTION, SOLUTION INTRAMUSCULAR; INTRAVENOUS
Status: DISPENSED
Start: 2019-10-07

## (undated) RX ORDER — HYDROMORPHONE HYDROCHLORIDE 1 MG/ML
INJECTION, SOLUTION INTRAMUSCULAR; INTRAVENOUS; SUBCUTANEOUS
Status: DISPENSED
Start: 2019-10-07

## (undated) RX ORDER — DEXAMETHASONE SODIUM PHOSPHATE 4 MG/ML
INJECTION, SOLUTION INTRA-ARTICULAR; INTRALESIONAL; INTRAMUSCULAR; INTRAVENOUS; SOFT TISSUE
Status: DISPENSED
Start: 2019-10-07

## (undated) RX ORDER — PHENAZOPYRIDINE HYDROCHLORIDE 100 MG/1
TABLET, FILM COATED ORAL
Status: DISPENSED
Start: 2019-10-07

## (undated) RX ORDER — CLINDAMYCIN PHOSPHATE 900 MG/50ML
INJECTION, SOLUTION INTRAVENOUS
Status: DISPENSED
Start: 2019-10-07

## (undated) RX ORDER — HEPARIN SODIUM 5000 [USP'U]/.5ML
INJECTION, SOLUTION INTRAVENOUS; SUBCUTANEOUS
Status: DISPENSED
Start: 2019-10-07

## (undated) RX ORDER — ACETAMINOPHEN 325 MG/1
TABLET ORAL
Status: DISPENSED
Start: 2019-10-07

## (undated) RX ORDER — ESMOLOL HYDROCHLORIDE 10 MG/ML
INJECTION INTRAVENOUS
Status: DISPENSED
Start: 2019-10-07

## (undated) RX ORDER — ONDANSETRON 2 MG/ML
INJECTION INTRAMUSCULAR; INTRAVENOUS
Status: DISPENSED
Start: 2019-10-07

## (undated) RX ORDER — SODIUM CHLORIDE, SODIUM LACTATE, POTASSIUM CHLORIDE, CALCIUM CHLORIDE 600; 310; 30; 20 MG/100ML; MG/100ML; MG/100ML; MG/100ML
INJECTION, SOLUTION INTRAVENOUS
Status: DISPENSED
Start: 2019-10-07